# Patient Record
Sex: MALE | Race: WHITE | NOT HISPANIC OR LATINO | Employment: FULL TIME | URBAN - METROPOLITAN AREA
[De-identification: names, ages, dates, MRNs, and addresses within clinical notes are randomized per-mention and may not be internally consistent; named-entity substitution may affect disease eponyms.]

---

## 2017-01-03 ENCOUNTER — ALLSCRIPTS OFFICE VISIT (OUTPATIENT)
Dept: OTHER | Facility: OTHER | Age: 47
End: 2017-01-03

## 2017-04-15 ENCOUNTER — APPOINTMENT (EMERGENCY)
Dept: RADIOLOGY | Facility: HOSPITAL | Age: 47
End: 2017-04-15
Payer: COMMERCIAL

## 2017-04-15 ENCOUNTER — HOSPITAL ENCOUNTER (EMERGENCY)
Facility: HOSPITAL | Age: 47
Discharge: HOME | End: 2017-04-15
Admitting: EMERGENCY MEDICINE
Payer: COMMERCIAL

## 2017-04-15 VITALS
RESPIRATION RATE: 18 BRPM | HEIGHT: 70 IN | OXYGEN SATURATION: 97 % | SYSTOLIC BLOOD PRESSURE: 164 MMHG | DIASTOLIC BLOOD PRESSURE: 108 MMHG | TEMPERATURE: 97.5 F | BODY MASS INDEX: 25.77 KG/M2 | WEIGHT: 180 LBS | HEART RATE: 97 BPM

## 2017-04-15 DIAGNOSIS — S99.921A INJURY OF TOE ON RIGHT FOOT, INITIAL ENCOUNTER: Primary | ICD-10-CM

## 2017-04-15 PROCEDURE — 73630 X-RAY EXAM OF FOOT: CPT

## 2017-04-15 PROCEDURE — 99283 EMERGENCY DEPT VISIT LOW MDM: CPT

## 2017-04-15 RX ORDER — SIMVASTATIN 10 MG
1 TABLET ORAL DAILY
COMMUNITY
Start: 2017-01-03 | End: 2019-02-02 | Stop reason: SDUPTHER

## 2017-04-15 RX ORDER — ZOLPIDEM TARTRATE 10 MG/1
1 TABLET ORAL DAILY
COMMUNITY
Start: 2016-10-24 | End: 2018-08-03 | Stop reason: SDUPTHER

## 2017-04-15 RX ORDER — AMLODIPINE BESYLATE 5 MG/1
1 TABLET ORAL DAILY
COMMUNITY
Start: 2016-01-21 | End: 2019-01-21 | Stop reason: SDUPTHER

## 2017-04-15 RX ORDER — TRAMADOL HYDROCHLORIDE 50 MG/1
1 TABLET ORAL DAILY
COMMUNITY
Start: 2016-01-21 | End: 2018-06-15 | Stop reason: SDUPTHER

## 2017-04-15 RX ORDER — NEBIVOLOL 5 MG/1
5 TABLET ORAL DAILY
COMMUNITY
Start: 2016-01-21 | End: 2018-08-03 | Stop reason: HOSPADM

## 2017-04-15 RX ORDER — LISINOPRIL AND HYDROCHLOROTHIAZIDE 25; 20 MG/1; MG/1
1 TABLET ORAL DAILY
COMMUNITY
Start: 2016-02-26 | End: 2019-01-21 | Stop reason: SDUPTHER

## 2017-05-09 ENCOUNTER — APPOINTMENT (EMERGENCY)
Dept: CT IMAGING | Facility: HOSPITAL | Age: 47
End: 2017-05-09
Payer: COMMERCIAL

## 2017-05-09 ENCOUNTER — HOSPITAL ENCOUNTER (EMERGENCY)
Facility: HOSPITAL | Age: 47
Discharge: HOME | End: 2017-05-09
Attending: EMERGENCY MEDICINE | Admitting: EMERGENCY MEDICINE
Payer: COMMERCIAL

## 2017-05-09 VITALS
RESPIRATION RATE: 20 BRPM | OXYGEN SATURATION: 96 % | HEIGHT: 71 IN | TEMPERATURE: 98.5 F | WEIGHT: 180 LBS | SYSTOLIC BLOOD PRESSURE: 117 MMHG | BODY MASS INDEX: 25.2 KG/M2 | HEART RATE: 82 BPM | DIASTOLIC BLOOD PRESSURE: 73 MMHG

## 2017-05-09 DIAGNOSIS — N20.1 URETERIC STONE: Primary | ICD-10-CM

## 2017-05-09 DIAGNOSIS — N23 RENAL COLIC ON LEFT SIDE: ICD-10-CM

## 2017-05-09 LAB
ANION GAP SERPL CALCULATED.3IONS-SCNC: 9 MMOL/L (ref 4–13)
BACTERIA UR QL AUTO: ABNORMAL /HPF
BASOPHILS # BLD AUTO: 0.04 THOUSANDS/ΜL (ref 0–0.1)
BASOPHILS NFR BLD AUTO: 0 % (ref 0–1)
BILIRUB UR QL STRIP: NEGATIVE
BUN SERPL-MCNC: 13 MG/DL (ref 5–25)
CALCIUM SERPL-MCNC: 9.1 MG/DL (ref 8.3–10.1)
CHLORIDE SERPL-SCNC: 102 MMOL/L (ref 100–108)
CLARITY UR: CLEAR
CLARITY, POC: CLEAR
CO2 SERPL-SCNC: 30 MMOL/L (ref 21–32)
COLOR UR: YELLOW
COLOR, POC: YELLOW
CREAT SERPL-MCNC: 0.97 MG/DL (ref 0.6–1.3)
EOSINOPHIL # BLD AUTO: 0.15 THOUSAND/ΜL (ref 0–0.61)
EOSINOPHIL NFR BLD AUTO: 1 % (ref 0–6)
ERYTHROCYTE [DISTWIDTH] IN BLOOD BY AUTOMATED COUNT: 13.8 % (ref 11.6–15.1)
EXT BILIRUBIN, UA: ABNORMAL
EXT BLOOD URINE: ABNORMAL
EXT GLUCOSE, UA: ABNORMAL
EXT KETONES: ABNORMAL
EXT NITRITE, UA: ABNORMAL
EXT PH, UA: 6
EXT PROTEIN, UA: ABNORMAL
EXT SPECIFIC GRAVITY, UA: 1.03
EXT UROBILINOGEN: 0.2
GFR SERPL CREATININE-BSD FRML MDRD: >60 ML/MIN/1.73SQ M
GLUCOSE SERPL-MCNC: 114 MG/DL (ref 65–140)
GLUCOSE UR STRIP-MCNC: NEGATIVE MG/DL
HCT VFR BLD AUTO: 44.3 % (ref 36.5–49.3)
HGB BLD-MCNC: 15.5 G/DL (ref 12–17)
HGB UR QL STRIP.AUTO: ABNORMAL
KETONES UR STRIP-MCNC: NEGATIVE MG/DL
LEUKOCYTE ESTERASE UR QL STRIP: NEGATIVE
LYMPHOCYTES # BLD AUTO: 2.77 THOUSANDS/ΜL (ref 0.6–4.47)
LYMPHOCYTES NFR BLD AUTO: 24 % (ref 14–44)
MCH RBC QN AUTO: 31.6 PG (ref 26.8–34.3)
MCHC RBC AUTO-ENTMCNC: 35 G/DL (ref 31.4–37.4)
MCV RBC AUTO: 90 FL (ref 82–98)
MONOCYTES # BLD AUTO: 0.85 THOUSAND/ΜL (ref 0.17–1.22)
MONOCYTES NFR BLD AUTO: 7 % (ref 4–12)
NEUTROPHILS # BLD AUTO: 7.84 THOUSANDS/ΜL (ref 1.85–7.62)
NEUTS SEG NFR BLD AUTO: 68 % (ref 43–75)
NITRITE UR QL STRIP: NEGATIVE
NON-SQ EPI CELLS URNS QL MICRO: ABNORMAL /HPF
PH UR STRIP.AUTO: 5.5 [PH] (ref 4.5–8)
PLATELET # BLD AUTO: 180 THOUSANDS/UL (ref 149–390)
PMV BLD AUTO: 10.8 FL (ref 8.9–12.7)
POTASSIUM SERPL-SCNC: 3.5 MMOL/L (ref 3.5–5.3)
PROT UR STRIP-MCNC: NEGATIVE MG/DL
RBC # BLD AUTO: 4.91 MILLION/UL (ref 3.88–5.62)
RBC #/AREA URNS AUTO: ABNORMAL /HPF
SODIUM SERPL-SCNC: 141 MMOL/L (ref 136–145)
SP GR UR STRIP.AUTO: >=1.03 (ref 1–1.03)
UROBILINOGEN UR QL STRIP.AUTO: 0.2 E.U./DL
WBC # BLD AUTO: 11.65 THOUSAND/UL (ref 4.31–10.16)
WBC # BLD EST: ABNORMAL 10*3/UL
WBC #/AREA URNS AUTO: ABNORMAL /HPF

## 2017-05-09 PROCEDURE — 96375 TX/PRO/DX INJ NEW DRUG ADDON: CPT

## 2017-05-09 PROCEDURE — 85025 COMPLETE CBC W/AUTO DIFF WBC: CPT | Performed by: EMERGENCY MEDICINE

## 2017-05-09 PROCEDURE — 81002 URINALYSIS NONAUTO W/O SCOPE: CPT | Performed by: EMERGENCY MEDICINE

## 2017-05-09 PROCEDURE — 74176 CT ABD & PELVIS W/O CONTRAST: CPT

## 2017-05-09 PROCEDURE — 80048 BASIC METABOLIC PNL TOTAL CA: CPT | Performed by: EMERGENCY MEDICINE

## 2017-05-09 PROCEDURE — 81001 URINALYSIS AUTO W/SCOPE: CPT | Performed by: EMERGENCY MEDICINE

## 2017-05-09 PROCEDURE — 96361 HYDRATE IV INFUSION ADD-ON: CPT

## 2017-05-09 PROCEDURE — 96374 THER/PROPH/DIAG INJ IV PUSH: CPT

## 2017-05-09 PROCEDURE — 87086 URINE CULTURE/COLONY COUNT: CPT | Performed by: EMERGENCY MEDICINE

## 2017-05-09 PROCEDURE — 36415 COLL VENOUS BLD VENIPUNCTURE: CPT | Performed by: EMERGENCY MEDICINE

## 2017-05-09 PROCEDURE — 99284 EMERGENCY DEPT VISIT MOD MDM: CPT

## 2017-05-09 RX ORDER — TAMSULOSIN HYDROCHLORIDE 0.4 MG/1
0.4 CAPSULE ORAL ONCE
Status: COMPLETED | OUTPATIENT
Start: 2017-05-09 | End: 2017-05-09

## 2017-05-09 RX ORDER — ONDANSETRON 2 MG/ML
4 INJECTION INTRAMUSCULAR; INTRAVENOUS ONCE
Status: COMPLETED | OUTPATIENT
Start: 2017-05-09 | End: 2017-05-09

## 2017-05-09 RX ORDER — MORPHINE SULFATE 4 MG/ML
4 INJECTION, SOLUTION INTRAMUSCULAR; INTRAVENOUS ONCE
Status: COMPLETED | OUTPATIENT
Start: 2017-05-09 | End: 2017-05-09

## 2017-05-09 RX ORDER — IBUPROFEN 600 MG/1
600 TABLET ORAL EVERY 6 HOURS PRN
Qty: 30 TABLET | Refills: 0 | Status: SHIPPED | OUTPATIENT
Start: 2017-05-09 | End: 2018-08-03 | Stop reason: HOSPADM

## 2017-05-09 RX ORDER — TAMSULOSIN HYDROCHLORIDE 0.4 MG/1
CAPSULE ORAL
Qty: 14 CAPSULE | Refills: 0 | Status: SHIPPED | OUTPATIENT
Start: 2017-05-09 | End: 2018-02-09 | Stop reason: SDUPTHER

## 2017-05-09 RX ORDER — ONDANSETRON 4 MG/1
4 TABLET, FILM COATED ORAL EVERY 6 HOURS
Qty: 120 TABLET | Refills: 0 | Status: SHIPPED | OUTPATIENT
Start: 2017-05-09 | End: 2018-08-03 | Stop reason: HOSPADM

## 2017-05-09 RX ORDER — KETOROLAC TROMETHAMINE 30 MG/ML
30 INJECTION, SOLUTION INTRAMUSCULAR; INTRAVENOUS ONCE
Status: COMPLETED | OUTPATIENT
Start: 2017-05-09 | End: 2017-05-09

## 2017-05-09 RX ORDER — OXYCODONE HYDROCHLORIDE AND ACETAMINOPHEN 5; 325 MG/1; MG/1
1 TABLET ORAL EVERY 4 HOURS PRN
COMMUNITY
End: 2018-04-16

## 2017-05-09 RX ADMIN — SODIUM CHLORIDE 1000 ML: 0.9 INJECTION, SOLUTION INTRAVENOUS at 02:50

## 2017-05-09 RX ADMIN — ONDANSETRON 4 MG: 2 INJECTION INTRAMUSCULAR; INTRAVENOUS at 02:50

## 2017-05-09 RX ADMIN — TAMSULOSIN HYDROCHLORIDE 0.4 MG: 0.4 CAPSULE ORAL at 04:22

## 2017-05-09 RX ADMIN — KETOROLAC TROMETHAMINE 30 MG: 30 INJECTION, SOLUTION INTRAMUSCULAR at 02:53

## 2017-05-09 RX ADMIN — MORPHINE SULFATE 4 MG: 4 INJECTION, SOLUTION INTRAMUSCULAR; INTRAVENOUS at 04:08

## 2017-05-10 LAB — BACTERIA UR CULT: NORMAL

## 2017-05-15 ENCOUNTER — ALLSCRIPTS OFFICE VISIT (OUTPATIENT)
Dept: OTHER | Facility: OTHER | Age: 47
End: 2017-05-15

## 2017-05-15 ENCOUNTER — GENERIC CONVERSION - ENCOUNTER (OUTPATIENT)
Dept: OTHER | Facility: OTHER | Age: 47
End: 2017-05-15

## 2017-05-15 LAB
CLARITY UR: NORMAL
COLOR UR: YELLOW
GLUCOSE (HISTORICAL): NORMAL
HGB UR QL STRIP.AUTO: NORMAL
KETONES UR STRIP-MCNC: NORMAL MG/DL
LEUKOCYTE ESTERASE UR QL STRIP: NORMAL
NITRITE UR QL STRIP: NORMAL
PH UR STRIP.AUTO: 5 [PH]
PROT UR STRIP-MCNC: NORMAL MG/DL
SP GR UR STRIP.AUTO: 1.03

## 2017-05-23 LAB
CA OXAL.DIHYDRATE (HISTORICAL): 3 %
CA OXAL.DIHYDRATE (HISTORICAL): 90 %
CA PHOSPHORUS (HISTORICAL): 5 %
COLOR UR: NORMAL
COMMENT (HISTORICAL): NORMAL
COMMENT (HISTORICAL): NORMAL
COMPOSITION (HISTORICAL): NORMAL
DISCLAIMER: (HISTORICAL): NORMAL
NIDUS (HISTORICAL): NORMAL
PLEASE NOTE: (HISTORICAL): NORMAL
SIZE (HISTORICAL): NORMAL MM
WEIGHT (HISTORICAL): 4 MG

## 2017-06-30 ENCOUNTER — ALLSCRIPTS OFFICE VISIT (OUTPATIENT)
Dept: OTHER | Facility: OTHER | Age: 47
End: 2017-06-30

## 2017-10-09 ENCOUNTER — ALLSCRIPTS OFFICE VISIT (OUTPATIENT)
Dept: OTHER | Facility: OTHER | Age: 47
End: 2017-10-09

## 2017-10-10 NOTE — PROGRESS NOTES
Assessment  1  Encounter for preventive health examination (V70 0) (Z00 00)   2  Lateral epicondylitis (726 32) (M77 10)   3  Anxiety (300 00) (F41 9)    Plan  Anxiety    · BuPROPion HCl ER (SR) 150 MG Oral Tablet Extended Release 12 Hour; take 1  tablet by mouth twice a day    Chief Complaint  right elbow pain--gallo shot      History of Present Illness  HPI: 41year old male complains of chronic right elbow pain on lateral aspect  Notes that cortisone shot last year relieved pain for one year  Patient is a  and notes that pain has returned  He denies new injury  Also complaining of daily, generalized anxiety  States anxiety keeps patient awake at night  Several life stressors contributing to anxiety  Has never been on SSRI or other antianxiety medication  Review of Systems    Constitutional: no fever or chills, feels well, no tiredness, no recent weight loss or weight gain  Cardiovascular: no complaints of slow or fast heart rate, no chest pain, no palpitations, no leg claudication or lower extremity edema  Respiratory: no complaints of shortness of breath, no wheezing or cough, no dyspnea on exertion, no orthopnea or PND  Musculoskeletal: as noted in HPI  Integumentary: no complaints of skin rash or lesion, no itching or dry skin, no skin wounds  Other Symptoms: anxiety  Active Problems  1  BPH (benign prostatic hyperplasia) (600 00) (N40 0)   2  Hyperlipidemia, mixed (272 2) (E78 2)   3  Hypertension (401 9) (I10)   4  Insomnia (780 52) (G47 00)   5  Lateral epicondylitis (726 32) (M77 10)   6  Lumbago (724 2) (M54 5)   7  Nephrolithiasis (592 0) (N20 0)   8  Strain of foot, right (845 10) (P06 685O)    Family History  Mother    1  Family history of hypertension (V17 49) (Z82 49)   2  Family history of multiple sclerosis (V17 2) (Z82 0)  Father    3  Family history of hypertension (V17 49) (Z82 49)  Family History    4  Family history of Back disorder   5   Family history of diabetes mellitus (V18 0) (Z83 3)   6  Family history of stroke (V17 1) (Z82 3)   7  Denied: Family history of substance abuse   8  No family history of mental disorder    Social History   · Current every day smoker (305 1) (F17 200)    Current Meds   1  AmLODIPine Besylate 5 MG Oral Tablet; take 1 tablet by mouth once daily; Therapy: 21Jan2016 to (Maren Ellis)  Requested for: 87IPW5813; Last   Rx:03Jan2017 Ordered   2  Bystolic 5 MG Oral Tablet; TAKE 1 TABLET DAILY; Therapy: 21Jan2016 to (Evaluate:72Oqh2199)  Requested for: 52Ydx1922; Last   Rx:19Apr2016 Ordered   3  Lisinopril-Hydrochlorothiazide 20-25 MG Oral Tablet; take 1 tablet by mouth once daily; Therapy: 57MYE6028 to (Maren Ellis)  Requested for: 99OAV9966; Last   Rx:03Jan2017 Ordered   4  Oxycodone-Acetaminophen  MG Oral Tablet; 1 q 4hrs prn; Therapy: 35Iuj6491 to (Last Rx:00Bye2716); Status: ACTIVE - Retrospective By   Protocol Authorization Ordered   5  Simvastatin 10 MG Oral Tablet; TAKE 1 TABLET DAILY; Therapy: 02OTJ6513 to (Evaluate:23Rnp4060)  Requested for: 21DDD5072; Last   KX:53AQK2408 Ordered   6  Tamsulosin HCl - 0 4 MG Oral Capsule; take 1 capsule daily; Therapy: 96XBB5124 to (Evaluate:66Ffm9377)  Requested for: 80GLP6121; Last   Rx:30Jun2017 Ordered   7  TraMADol HCl - 50 MG Oral Tablet; take 1 tablet by mouth every 4 hours if needed; Therapy: 21Jan2016 to ([phone])  Requested for: 55Dhp9432; Last   Rx:30Jun2017; Status: ACTIVE - Renewal Denied Ordered   8  Zolpidem Tartrate 10 MG Oral Tablet; Take 1 tablet by mouth at bedtime; Therapy: 51DOB4654 to ([id number])  Requested for: 00HHQ1394; Last   II:02GHX1224; Status: ACTIVE - Retrospective By Protocol Authorization Ordered    Allergies  1   No Known Drug Allergies    Vitals   Recorded: 50BMC3703 02:45PM   Heart Rate 96   Systolic 620   Diastolic 80   Height 5 ft 10 in   Weight 180 lb    BMI Calculated 25 83   BSA Calculated 2   O2 Saturation 98     Physical Exam    Constitutional   General appearance: No acute distress, well appearing and well nourished  Pulmonary   Respiratory effort: No increased work of breathing or signs of respiratory distress  Auscultation of lungs: Clear to auscultation, equal breath sounds bilaterally, no wheezes, no rales, no rhonci  Cardiovascular   Auscultation of heart: Normal rate and rhythm, normal S1 and S2, without murmurs  Musculoskeletal   Gait and station: Normal     Digits and nails: Normal without clubbing or cyanosis  Inspection/palpation of joints, bones, and muscles: Abnormal  -Tenderness to palpation lateral aspect of right elbow  Skin   Skin and subcutaneous tissue: Normal without rashes or lesions  Results/Data  PHQ-2 Adult Depression Screening 80SVU8546 02:49PM User, [facility]     Test Name Result Flag Reference   PHQ-2 Adult Depression Score 0     Over the last two weeks, how often have you been bothered by any of the following problems? Little interest or pleasure in doing things: Not at all - 0  Feeling down, depressed, or hopeless: Not at all - 0   PHQ-2 Adult Depression Screening Negative         Procedure    Procedure: Injection of the tendon sheath on the right   Indication: joint pain  Risk and bleeding risk were discussed with the patient  Verbal consent was obtained prior to the procedure  Preparation: alcohol was used to prep the area  Procedure Note: Using sterile technique, the aspiration/injection needle was then directed from a direct aspect  A 22-gauge and 1 5 inch was used to inject 2 mL of 1% Lidocaine-and-1 mL of 10 mg/mL dexamethasone  Post-Procedure: the patient tolerated the procedure well  Complications: None  Follow-up in the office as needed        Signatures   Electronically signed by : Cristopher Silva MD; Oct  9 2017  4:22PM EST                       (Author)

## 2017-12-20 ENCOUNTER — GENERIC CONVERSION - ENCOUNTER (OUTPATIENT)
Dept: OTHER | Facility: OTHER | Age: 47
End: 2017-12-20

## 2017-12-28 LAB
A/G RATIO (HISTORICAL): 1.8 (ref 1.2–2.2)
ALBUMIN SERPL BCP-MCNC: 4.4 G/DL (ref 3.5–5.5)
ALP SERPL-CCNC: 90 IU/L (ref 39–117)
ALT SERPL W P-5'-P-CCNC: 32 IU/L (ref 0–44)
AST SERPL W P-5'-P-CCNC: 27 IU/L (ref 0–40)
BILIRUB SERPL-MCNC: <0.2 MG/DL (ref 0–1.2)
BUN SERPL-MCNC: 9 MG/DL (ref 6–24)
BUN/CREA RATIO (HISTORICAL): 9 (ref 9–20)
CALCIUM SERPL-MCNC: 9.8 MG/DL (ref 8.7–10.2)
CHLORIDE SERPL-SCNC: 102 MMOL/L (ref 96–106)
CHOLEST SERPL-MCNC: 207 MG/DL (ref 100–199)
CO2 SERPL-SCNC: 28 MMOL/L (ref 18–29)
CREAT SERPL-MCNC: 0.97 MG/DL (ref 0.76–1.27)
EGFR AFRICAN AMERICAN (HISTORICAL): 109 ML/MIN/1.73
EGFR-AMERICAN CALC (HISTORICAL): 94 ML/MIN/1.73
GLUCOSE SERPL-MCNC: 96 MG/DL (ref 65–99)
HDLC SERPL-MCNC: 47 MG/DL
LDLC SERPL CALC-MCNC: 126 MG/DL (ref 0–99)
POTASSIUM SERPL-SCNC: 4.4 MMOL/L (ref 3.5–5.2)
SODIUM SERPL-SCNC: 144 MMOL/L (ref 134–144)
TOT. GLOBULIN, SERUM (HISTORICAL): 2.5 G/DL (ref 1.5–4.5)
TOTAL PROTEIN (HISTORICAL): 6.9 G/DL (ref 6–8.5)
TRIGL SERPL-MCNC: 172 MG/DL (ref 0–149)
VLDLC SERPL CALC-MCNC: 34 MG/DL (ref 5–40)

## 2017-12-30 ENCOUNTER — GENERIC CONVERSION - ENCOUNTER (OUTPATIENT)
Dept: OTHER | Facility: OTHER | Age: 47
End: 2017-12-30

## 2018-01-12 NOTE — PROGRESS NOTES
Assessment    1  Encounter for preventive health examination (V70 0) (Z00 00)   2  Lateral epicondylitis (726 32) (M77 10)    Discussion/Summary  Advice and education were given regarding tobacco cessation and sunscreen use  Chief Complaint  Routine PE      Advance Directives  Advance Directive [facility]:   NO - Patient does not have an advance health care directive  History of Present Illness  HM, Adult Male: The patient is being seen for a health maintenance evaluation  Social History: Household members include spouse  He is   Work status: working full time  The patient is a current cigarette smoker  He has smoked for 20 year(s)  He is not ready to quit using tobacco  He reports rare alcohol use and denies binge drinking  The patient has no concerns about alcohol abuse  He has never used illicit drugs  General Health: The patient's health since the last visit is described as good  He has regular dental visits  He complains of vision problems  He denies hearing loss  Immunizations status: up to date  Lifestyle:  He does not have a healthy diet  He does not have any weight concerns  He exercises regularly  He uses tobacco  He consumes alcohol  He denies drug use  Reproductive health:  the patient is sexually active  Screening: Active Problems    1  BPH (benign prostatic hyperplasia) (600 00) (N40 0)   2  Hyperlipidemia, mixed (272 2) (E78 2)   3  Hypertension (401 9) (I10)   4  Insomnia (780 52) (G47 00)   5  Lateral epicondylitis (726 32) (M77 10)   6  Lumbago (724 2) (M54 5)   7  Nephrolithiasis (592 0) (N20 0)   8   Strain of foot, right (845 10) (J30 559J)    Family History  Mother    · Family history of hypertension (V17 49) (Z82 49)   · Family history of multiple sclerosis (V17 2) (Z82 0)  Father    · Family history of hypertension (V17 49) (Z82 49)  Family History    · Family history of Back disorder   · Family history of diabetes mellitus (V18 0) (Z83 3)   · Family history of stroke (V17 1) (Z82 3)   · Denied: Family history of substance abuse   · No family history of mental disorder    Social History    · Current every day smoker (305 1) (F17 200)    Current Meds   1  AmLODIPine Besylate 5 MG Oral Tablet; take 1 tablet by mouth once daily; Therapy: 21Jan2016 to (Johanna Arriola)  Requested for: 62HYO3052; Last   Rx:03Jan2017 Ordered   2  Bystolic 5 MG Oral Tablet; TAKE 1 TABLET DAILY; Therapy: 21Jan2016 to (Evaluate:69Ocu6028)  Requested for: 32Lxf5835; Last   Rx:19Apr2016 Ordered   3  Lisinopril-Hydrochlorothiazide 20-25 MG Oral Tablet; take 1 tablet by mouth once daily; Therapy: 74YYO0943 to (Johanna Arriola)  Requested for: 01JME3784; Last   Rx:03Jan2017 Ordered   4  Oxycodone-Acetaminophen  MG Oral Tablet; 1 q 4hrs prn; Therapy: 87Ccj3820 to (Last Rx:93Ewg2669); Status: ACTIVE - Retrospective By   Protocol Authorization Ordered   5  Simvastatin 10 MG Oral Tablet; TAKE 1 TABLET DAILY; Therapy: 87DNO1826 to (Evaluate:68Dvy7753)  Requested for: 43PZQ5638; Last   SH:35DCH2764 Ordered   6  Tamsulosin HCl - 0 4 MG Oral Capsule; take 1 capsule daily; Therapy: 91BZM0296 to (Evaluate:88Qyp1068)  Requested for: 22QKW2882; Last   Rx:30Jun2017 Ordered   7  TraMADol HCl - 50 MG Oral Tablet; take 1 tablet by mouth every 4 hours if needed; Therapy: 21Jan2016 to (Rosy Gustafson)  Requested for: 22Xlu6734; Last   Rx:30Jun2017; Status: ACTIVE - Renewal Denied Ordered   8  Zolpidem Tartrate 10 MG Oral Tablet; Take 1 tablet by mouth at bedtime; Therapy: 57FWX9867 to (03 17 74 30 53)  Requested for: 95KYL9178; Last   DR:21KXQ3865; Status: ACTIVE - Retrospective By Protocol Authorization Ordered    Allergies    1   No Known Drug Allergies    Vitals   Recorded: 69TFM7751 02:45PM   Heart Rate 96   Systolic 744   Diastolic 80   Height 5 ft 10 in   Weight 180 lb    BMI Calculated 25 83   BSA Calculated 2   O2 Saturation 98     Physical Exam    Constitutional   General appearance: No acute distress, well appearing and well nourished  Neck   Neck: Supple, symmetric, trachea midline, no masses  Thyroid: Normal, no thyromegaly  Pulmonary   Auscultation of lungs: Clear to auscultation  Cardiovascular   Palpation of heart: Normal PMI, no thrills  Examination of extremities for edema and/or varicosities: Normal     Abdomen   Abdomen: Non-tender, no masses  Results/Data  PHQ-2 Adult Depression Screening 48FGU5052 02:49PM User, [facility]     Test Name Result Flag Reference   PHQ-2 Adult Depression Score 0     Over the last two weeks, how often have you been bothered by any of the following problems?   Little interest or pleasure in doing things: Not at all - 0  Feeling down, depressed, or hopeless: Not at all - 0   PHQ-2 Adult Depression Screening Negative         Signatures   Electronically signed by : Yessenia Klein MD; Oct  9 2017  2:58PM EST                       (Author)

## 2018-01-12 NOTE — RESULT NOTES
Verified Results  [facility]) CMP14+eGFR 89YLD3477 08:58AM Kendall Mcmanus     Test Name Result Flag Reference   Glucose, Serum 82 mg/dL  65-99   BUN 9 mg/dL  6-24   Creatinine, Serum 0 92 mg/dL  0 76-1 27   eGFR If NonAfricn Am 101 mL/min/1 73  >59   eGFR If Africn Am 117 mL/min/1 73  >59   BUN/Creatinine Ratio 10  9-20   Sodium, Serum 142 mmol/L  134-144   Potassium, Serum 4 3 mmol/L  3 5-5 2   Chloride, Serum 100 mmol/L     Carbon Dioxide, Total 27 mmol/L  18-29   Calcium, Serum 9 8 mg/dL  8 7-10 2   Protein, Total, Serum 6 8 g/dL  6 0-8 5   Albumin, Serum 4 5 g/dL  3 5-5 5   Globulin, Total 2 3 g/dL  1 5-4 5   A/G Ratio 2 0  1 1-2 5   Bilirubin, Total 0 3 mg/dL  0 0-1 2   Alkaline Phosphatase, S 70 IU/L     AST (SGOT) 20 IU/L  0-40   ALT (SGPT) 18 IU/L  0-44   Glucose, Serum 82 mg/dL  65-99   BUN 9 mg/dL  6-24   Creatinine, Serum 0 92 mg/dL  0 76-1 27   eGFR If NonAfricn Am 101 mL/min/1 73  >59   eGFR If Africn Am 117 mL/min/1 73  >59   BUN/Creatinine Ratio 10  9-20   Sodium, Serum 142 mmol/L  134-144   Potassium, Serum 4 3 mmol/L  3 5-5 2   Chloride, Serum 100 mmol/L     Carbon Dioxide, Total 27 mmol/L  18-29   Calcium, Serum 9 8 mg/dL  8 7-10 2   Protein, Total, Serum 6 8 g/dL  6 0-8 5   Albumin, Serum 4 5 g/dL  3 5-5 5   Globulin, Total 2 3 g/dL  1 5-4 5   A/G Ratio 2 0  1 1-2 5   Bilirubin, Total 0 3 mg/dL  0 0-1 2   Alkaline Phosphatase, S 70 IU/L     AST (SGOT) 20 IU/L  0-40   ALT (SGPT) 18 IU/L  0-44           (LC) Lipid Panel 58Jgd0788 08:58AM Feedback     Test Name Result Flag Reference   Cholesterol, Total 250 mg/dL H 100-199   Triglycerides 170 mg/dL H 0-149   HDL Cholesterol 56 mg/dL  >39   VLDL Cholesterol Arnaldo 34 mg/dL  5-40   LDL Cholesterol Calc 160 mg/dL H 0-99   Cholesterol, Total 250 mg/dL H 100-199   Triglycerides 170 mg/dL H 0-149   HDL Cholesterol 56 mg/dL  >39   VLDL Cholesterol Arnaldo 34 mg/dL  5-40   LDL Cholesterol Calc 160 mg/dL H 0-99

## 2018-01-13 VITALS
HEIGHT: 70 IN | BODY MASS INDEX: 25.91 KG/M2 | WEIGHT: 181 LBS | DIASTOLIC BLOOD PRESSURE: 75 MMHG | SYSTOLIC BLOOD PRESSURE: 126 MMHG | HEART RATE: 96 BPM | OXYGEN SATURATION: 98 %

## 2018-01-14 VITALS
HEIGHT: 70 IN | OXYGEN SATURATION: 98 % | HEART RATE: 76 BPM | BODY MASS INDEX: 26.34 KG/M2 | WEIGHT: 184 LBS | RESPIRATION RATE: 16 BRPM | DIASTOLIC BLOOD PRESSURE: 100 MMHG | SYSTOLIC BLOOD PRESSURE: 140 MMHG

## 2018-01-14 VITALS
SYSTOLIC BLOOD PRESSURE: 128 MMHG | HEART RATE: 96 BPM | HEIGHT: 70 IN | BODY MASS INDEX: 25.77 KG/M2 | WEIGHT: 180 LBS | DIASTOLIC BLOOD PRESSURE: 80 MMHG | OXYGEN SATURATION: 98 %

## 2018-01-14 VITALS
WEIGHT: 186 LBS | SYSTOLIC BLOOD PRESSURE: 133 MMHG | HEART RATE: 88 BPM | DIASTOLIC BLOOD PRESSURE: 87 MMHG | BODY MASS INDEX: 26.63 KG/M2 | HEIGHT: 70 IN

## 2018-01-17 NOTE — PROGRESS NOTES
Assessment   1  Hypertension (401 9) (I10)  2  Lumbago (724 2) (M54 5)  3  Hyperlipidemia, mixed (272 2) (E78 2)    Plan  Lumbago    · Changed: From  TraMADol HCl - 50 MG Oral Tablet Take 1 tablet twice daily To TraMADol  HCl - 50 MG Oral Tablet 1 q 4hrs prn   · Renew: Oxycodone-Acetaminophen  MG Oral Tablet; TAKE 1 TABLET Every 4  hours PRN pain    f/u prn     Chief Complaint  med manage      History of Present Illness  see cc      Review of Systems    Constitutional: No fever or chills, feels well, no tiredness, no recent weight gain or weight loss  Cardiovascular: No complaints of slow heart rate, no fast heart rate, no chest pain, no palpitations, no leg claudication, no lower extremity  Respiratory: No complaints of shortness of breath, no wheezing, no cough, no SOB on exertion, no orthopnea or PND  Active Problems   1  Hyperlipidemia, mixed (272 2) (E78 2)  2  Hypertension (401 9) (I10)  3  Insomnia (780 52) (G47 00)  4  Lumbago (724 2) (M54 5)    Family History   1  Family history of hypertension (V17 49) (Z82 49)  2  Family history of multiple sclerosis (V17 2) (Z82 0)   3  Family history of hypertension (V17 49) (Z82 49)    The family history was reviewed and updated today  Social History    · Current every day smoker (305 1) (F17 200)    Current Meds  1  AmLODIPine Besylate 5 MG Oral Tablet; Take 1 tablet daily; Therapy: 21Jan2016 to (Evaluate:20Apr2016) Recorded  2  Bystolic 5 MG Oral Tablet; TAKE 1 TABLET DAILY; Therapy: 21Jan2016 to Recorded  3  Lisinopril-Hydrochlorothiazide 10-12 5 MG Oral Tablet; TAKE 1 TABLET TWICE DAILY; Therapy: 21Jan2016 to Recorded  4  Oxycodone-Acetaminophen  MG Oral Tablet; TAKE 1 TABLET EVERY 4 TO 6   HOURS AS NEEDED FOR PAIN;   Therapy: 56UHM3621 to (Evaluate:08Jan2016); Last Rx:09Vgi6613 Ordered  5  TraMADol HCl - 50 MG Oral Tablet; Take 1 tablet twice daily; Therapy: 21Jan2016 to (Evaluate:15Sxe1531) Recorded  6   Zolpidem Tartrate 10 MG Oral Tablet; Take 1 tablet by mouth at bedtime; Therapy: 69HQA5060 to (Evaluate:35Ttx9659)  Requested for: 36IUS2468; Last   Rx:05Jan2016; Status: ACTIVE - Renewal Denied Ordered    Allergies   1  No Known Drug Allergies    Vitals  Vital Signs [Data Includes: Current Encounter]    Recorded: 87FOJ0067 56:69SS   Systolic 172   Diastolic 82   Height 5 ft 10 in   Weight 176 lb    BMI Calculated 25 25   BSA Calculated 1 98     Physical Exam    Constitutional   General appearance: No acute distress, well appearing and well nourished  Pulmonary   Respiratory effort: No increased work of breathing or signs of respiratory distress  Cardiovascular   Auscultation of heart: Normal rate and rhythm, normal S1 and S2, without murmurs      Musculoskeletal   Inspection/palpation of joints, bones, and muscles: Normal          Signatures   Electronically signed by : Franky Lomax MD; Jan 21 2016  4:25PM EST                       (Author)    Electronically signed by : Franky Lomax MD; Jan 21 2016  4:26PM EST                       (Author)

## 2018-01-17 NOTE — RESULT NOTES
Verified Results  * XR FOOT 3+ VIEW RIGHT 52Eus7721 04:06PM [facility] Order Number: [id number]     Test Name Result Flag Reference   XR FOOT 3+ VW RIGHT (Report)     RIGHT FOOT     INDICATION: Right heel pain  COMPARISON: None     VIEWS: AP, lateral and oblique ; 3 images     FINDINGS:     There is no acute fracture or dislocation  Small plantar and retrocalcaneal spurs noted  No lytic or blastic lesions are seen  Soft tissues are unremarkable  IMPRESSION:     No acute osseous abnormality  Workstation performed: [id number]     Signed by:   Joanna Braga MD   8/30/16

## 2018-01-19 ENCOUNTER — ALLSCRIPTS OFFICE VISIT (OUTPATIENT)
Dept: OTHER | Facility: OTHER | Age: 48
End: 2018-01-19

## 2018-01-20 NOTE — PROGRESS NOTES
Assessment   1  Hyperlipidemia, mixed (272 2) (E78 2)   2  Hypertension (401 9) (I10)   3  Anxiety (300 00) (F41 9)    Plan   Anxiety    · BuPROPion HCl ER (SR) 150 MG Oral Tablet Extended Release 12 Hour   · PARoxetine HCl - 20 MG Oral Tablet (Paxil); TAKE ONE (1) TABLET(S) DAILY  Hyperlipidemia, mixed    · Simvastatin 10 MG Oral Tablet; take 1 tablet by mouth once daily  Hypertension    · AmLODIPine Besylate 5 MG Oral Tablet; take 1 tablet by mouth once daily   · Lisinopril-Hydrochlorothiazide 20-25 MG Oral Tablet; take 1 tablet by mouth once    daily  Insomnia    · Zolpidem Tartrate 10 MG Oral Tablet; Take 1 tablet by mouth at bedtime  Lumbago    · TraMADol HCl - 50 MG Oral Tablet; take 1 tablet by mouth every 4 hours if    needed  Strain of foot, right    · Oxycodone-Acetaminophen  MG Oral Tablet; 1 q 4hrs prn; Do Not Fill    Before: 80JPP8471    Chief Complaint   MM-REVIEW LABS & MEDICATIONS BuPROP  History of Present Illness   stable lipids/pain---incr anxiety      Review of Systems        Constitutional: No fever or chills, feels well, no tiredness, no recent weight gain or weight loss  Cardiovascular: No complaints of slow heart rate, no fast heart rate, no chest pain, no palpitations, no leg claudication, no lower extremity  Respiratory: No complaints of shortness of breath, no wheezing, no cough, no SOB on exertion, no orthopnea or PND  Active Problems   1  Anxiety (300 00) (F41 9)   2  BPH (benign prostatic hyperplasia) (600 00) (N40 0)   3  Hyperlipidemia, mixed (272 2) (E78 2)   4  Hypertension (401 9) (I10)   5  Insomnia (780 52) (G47 00)   6  Lateral epicondylitis (726 32) (M77 10)   7  Lumbago (724 2) (M54 5)   8  Nephrolithiasis (592 0) (N20 0)   9  Strain of foot, right (845 10) (M78 933L)    Past Medical History      The active problems and past medical history were reviewed and updated today  Family History   Mother    1   Family history of hypertension (V17 49) (Z82 49)   2  Family history of multiple sclerosis (V17 2) (Z82 0)  Father    3  Family history of hypertension (V17 49) (Z82 49)  Family History    4  Family history of Back disorder   5  Family history of diabetes mellitus (V18 0) (Z83 3)   6  Family history of stroke (V17 1) (Z82 3)   7  Denied: Family history of substance abuse   8  No family history of mental disorder    Social History    · Current every day smoker (305 1) (F17 200)    Current Meds    1  AmLODIPine Besylate 5 MG Oral Tablet; take 1 tablet by mouth once daily; Therapy: 21Jan2016 to (Evaluate:11Jun2018)  Requested for: 66Bnf3825; Last     Rx:47Rli4264; Status: ACTIVE - Retrospective By Protocol Authorization Ordered   2  BuPROPion HCl ER (SR) 150 MG Oral Tablet Extended Release 12 Hour; take 1 tablet by     mouth twice a day; Therapy: 74QVX1343 to 9397 8822)  Requested for: 09Oct2017; Last     Rx:09Oct2017 Ordered   3  Bystolic 5 MG Oral Tablet; TAKE 1 TABLET DAILY; Therapy: 21Jan2016 to (Evaluate:14Apr2017)  Requested for: 85Zga6177; Last     Rx:52Vpe6517 Ordered   4  Lisinopril-Hydrochlorothiazide 20-25 MG Oral Tablet; take 1 tablet by mouth once daily; Therapy: 98ACI9485 to (Roxana Carmichael)  Requested for: 23OJI1465; Last     Rx:08Jan2018; Status: ACTIVE - Retrospective By Protocol Authorization Ordered   5  Oxycodone-Acetaminophen  MG Oral Tablet; 1 q 4hrs prn; Therapy: 20Ypl1361 to (Last Rx:06Cqn5822); Status: ACTIVE - Retrospective By Protocol     Authorization Ordered   6  Simvastatin 10 MG Oral Tablet; take 1 tablet by mouth once daily; Therapy: 34FHD1422 to (Roxana Carmichael)  Requested for: 05VHP0023; Last     Rx:08Jan2018; Status: ACTIVE - Retrospective By Protocol Authorization Ordered   7  Tamsulosin HCl - 0 4 MG Oral Capsule; take 1 capsule daily; Therapy: 78PUM2229 to (Evaluate:81Izb1118)  Requested for: 36SDP8100; Last     Rx:30Jun2017 Ordered   8   TraMADol HCl - 50 MG Oral Tablet; take 1 tablet by mouth every 4 hours if needed; Therapy: 21Jan2016 to (Evaluate:98Gex5773)  Requested for: 75PIG6371; Last     Rx:87Xfn0191; Status: ACTIVE - Retrospective By Protocol Authorization Ordered   9  Zolpidem Tartrate 10 MG Oral Tablet; Take 1 tablet by mouth at bedtime; Therapy: 97RFT4000 to (Evaluate:21Apr2018)  Requested for: 23Oct2017; Last     Rx:23Oct2017; Status: ACTIVE - Retrospective By Protocol Authorization Ordered    Allergies   1  No Known Drug Allergies    Vitals   Vital Signs    Recorded: 23DXS6424 03:03PM   Heart Rate 954   Systolic 095   Diastolic 76   Height 5 ft 10 in   Weight 184 lb    BMI Calculated 26 4   BSA Calculated 2 01   O2 Saturation 95     Physical Exam        Constitutional      General appearance: No acute distress, well appearing and well nourished  Pulmonary      Auscultation of lungs: Clear to auscultation, equal breath sounds bilaterally, no wheezes, no rales, no rhonci  Cardiovascular      Auscultation of heart: Normal rate and rhythm, normal S1 and S2, without murmurs            Signatures    Electronically signed by : Vicente Stroud MD; Jan 19 2018  3:30PM EST                       (Author)

## 2018-01-23 VITALS
HEIGHT: 70 IN | SYSTOLIC BLOOD PRESSURE: 124 MMHG | WEIGHT: 184 LBS | BODY MASS INDEX: 26.34 KG/M2 | OXYGEN SATURATION: 95 % | HEART RATE: 108 BPM | DIASTOLIC BLOOD PRESSURE: 76 MMHG

## 2018-01-23 NOTE — RESULT NOTES
Verified Results  (1) COMPREHENSIVE METABOLIC PANEL 21QVR7665 35:59HH Shearon Guard     Test Name Result Flag Reference   Glucose, Serum 96 mg/dL  65-99   BUN 9 mg/dL  6-24   Creatinine, Serum 0 97 mg/dL  0 76-1 27   BUN/Creatinine Ratio 9  9-20   Sodium, Serum 144 mmol/L  134-144   Potassium, Serum 4 4 mmol/L  3 5-5 2   Chloride, Serum 102 mmol/L     Carbon Dioxide, Total 28 mmol/L  18-29   Calcium, Serum 9 8 mg/dL  8 7-10 2   Protein, Total, Serum 6 9 g/dL  6 0-8 5   Albumin, Serum 4 4 g/dL  3 5-5 5   Globulin, Total 2 5 g/dL  1 5-4 5   A/G Ratio 1 8  1 2-2 2   Bilirubin, Total <0 2 mg/dL  0 0-1 2   Alkaline Phosphatase, S 90 IU/L     AST (SGOT) 27 IU/L  0-40   ALT (SGPT) 32 IU/L  0-44   eGFR If NonAfricn Am 94 mL/min/1 73  >59   eGFR If Africn Am 109 mL/min/1 73  >59   Glucose, Serum 96 mg/dL  65-99   BUN 9 mg/dL  6-24   Creatinine, Serum 0 97 mg/dL  0 76-1 27   BUN/Creatinine Ratio 9  9-20   Sodium, Serum 144 mmol/L  134-144   Potassium, Serum 4 4 mmol/L  3 5-5 2   Chloride, Serum 102 mmol/L     Carbon Dioxide, Total 28 mmol/L  18-29   Calcium, Serum 9 8 mg/dL  8 7-10 2   Protein, Total, Serum 6 9 g/dL  6 0-8 5   Albumin, Serum 4 4 g/dL  3 5-5 5   Globulin, Total 2 5 g/dL  1 5-4 5   A/G Ratio 1 8  1 2-2 2   Bilirubin, Total <0 2 mg/dL  0 0-1 2   Alkaline Phosphatase, S 90 IU/L     AST (SGOT) 27 IU/L  0-40   ALT (SGPT) 32 IU/L  0-44   eGFR If NonAfricn Am 94 mL/min/1 73  >59   eGFR If Africn Am 109 mL/min/1 73  >59           (LC) Lipid Panel 28Lva2631 09:21AM Shearon Guard     Test Name Result Flag Reference   Cholesterol, Total 207 mg/dL H 100-199   Triglycerides 172 mg/dL H 0-149   HDL Cholesterol 47 mg/dL  >39   VLDL Cholesterol Arnaldo 34 mg/dL  5-40   LDL Cholesterol Calc 126 mg/dL H 0-99   Cholesterol, Total 207 mg/dL H 100-199   Triglycerides 172 mg/dL H 0-149   HDL Cholesterol 47 mg/dL  >39   VLDL Cholesterol Arnaldo 34 mg/dL  5-40   LDL Cholesterol Calc 126 mg/dL H 0-99

## 2018-02-09 DIAGNOSIS — N40.0 ENLARGED PROSTATE: Primary | ICD-10-CM

## 2018-02-09 RX ORDER — TAMSULOSIN HYDROCHLORIDE 0.4 MG/1
CAPSULE ORAL
Qty: 90 CAPSULE | Refills: 1 | Status: SHIPPED | OUTPATIENT
Start: 2018-02-09 | End: 2018-08-03 | Stop reason: HOSPADM

## 2018-04-16 DIAGNOSIS — S96.912S STRAIN OF LEFT FOOT, SEQUELA: Primary | ICD-10-CM

## 2018-04-16 RX ORDER — PAROXETINE HYDROCHLORIDE 20 MG/1
1 TABLET, FILM COATED ORAL DAILY
COMMUNITY
Start: 2018-01-19 | End: 2018-08-03 | Stop reason: HOSPADM

## 2018-04-16 RX ORDER — OXYCODONE AND ACETAMINOPHEN 10; 325 MG/1; MG/1
1 TABLET ORAL EVERY 8 HOURS PRN
Qty: 90 TABLET | Refills: 0 | Status: SHIPPED | OUTPATIENT
Start: 2018-04-16 | End: 2018-05-15 | Stop reason: SDUPTHER

## 2018-04-16 RX ORDER — OXYCODONE AND ACETAMINOPHEN 10; 325 MG/1; MG/1
TABLET ORAL
COMMUNITY
Start: 2018-01-22 | End: 2018-04-16 | Stop reason: SDUPTHER

## 2018-04-16 RX ORDER — BUPROPION HYDROCHLORIDE 150 MG/1
1 TABLET, EXTENDED RELEASE ORAL 2 TIMES DAILY
COMMUNITY
Start: 2017-10-09 | End: 2018-08-03 | Stop reason: HOSPADM

## 2018-05-15 DIAGNOSIS — S96.912S STRAIN OF LEFT FOOT, SEQUELA: ICD-10-CM

## 2018-05-16 RX ORDER — OXYCODONE AND ACETAMINOPHEN 10; 325 MG/1; MG/1
1 TABLET ORAL EVERY 8 HOURS PRN
Qty: 90 TABLET | Refills: 0 | Status: SHIPPED | OUTPATIENT
Start: 2018-05-16 | End: 2018-06-15 | Stop reason: SDUPTHER

## 2018-06-15 DIAGNOSIS — S96.912S STRAIN OF LEFT FOOT, SEQUELA: ICD-10-CM

## 2018-06-18 RX ORDER — OXYCODONE AND ACETAMINOPHEN 10; 325 MG/1; MG/1
1 TABLET ORAL EVERY 8 HOURS PRN
Qty: 90 TABLET | Refills: 0 | Status: SHIPPED | OUTPATIENT
Start: 2018-06-18 | End: 2018-08-03 | Stop reason: SDUPTHER

## 2018-06-18 RX ORDER — TRAMADOL HYDROCHLORIDE 50 MG/1
TABLET ORAL
Qty: 120 TABLET | Refills: 0 | Status: SHIPPED | OUTPATIENT
Start: 2018-06-18 | End: 2018-07-15 | Stop reason: SDUPTHER

## 2018-07-15 DIAGNOSIS — S96.912S STRAIN OF LEFT FOOT, SEQUELA: ICD-10-CM

## 2018-07-16 RX ORDER — TRAMADOL HYDROCHLORIDE 50 MG/1
TABLET ORAL
Qty: 120 TABLET | Refills: 0 | Status: SHIPPED | OUTPATIENT
Start: 2018-07-16 | End: 2018-08-03 | Stop reason: SDUPTHER

## 2018-08-03 ENCOUNTER — OFFICE VISIT (OUTPATIENT)
Dept: FAMILY MEDICINE CLINIC | Facility: CLINIC | Age: 48
End: 2018-08-03
Payer: COMMERCIAL

## 2018-08-03 VITALS
WEIGHT: 188 LBS | SYSTOLIC BLOOD PRESSURE: 140 MMHG | HEART RATE: 87 BPM | OXYGEN SATURATION: 98 % | DIASTOLIC BLOOD PRESSURE: 82 MMHG | HEIGHT: 71 IN | BODY MASS INDEX: 26.32 KG/M2

## 2018-08-03 DIAGNOSIS — M54.42 CHRONIC BILATERAL LOW BACK PAIN WITH BILATERAL SCIATICA: ICD-10-CM

## 2018-08-03 DIAGNOSIS — M54.41 CHRONIC BILATERAL LOW BACK PAIN WITH BILATERAL SCIATICA: ICD-10-CM

## 2018-08-03 DIAGNOSIS — G89.4 CHRONIC PAIN SYNDROME: ICD-10-CM

## 2018-08-03 DIAGNOSIS — G47.00 INSOMNIA, UNSPECIFIED TYPE: Primary | ICD-10-CM

## 2018-08-03 DIAGNOSIS — G89.29 CHRONIC BILATERAL LOW BACK PAIN WITH BILATERAL SCIATICA: ICD-10-CM

## 2018-08-03 DIAGNOSIS — S96.912S STRAIN OF LEFT FOOT, SEQUELA: ICD-10-CM

## 2018-08-03 PROCEDURE — 99213 OFFICE O/P EST LOW 20 MIN: CPT | Performed by: FAMILY MEDICINE

## 2018-08-03 PROCEDURE — 3008F BODY MASS INDEX DOCD: CPT | Performed by: FAMILY MEDICINE

## 2018-08-03 RX ORDER — TRAMADOL HYDROCHLORIDE 50 MG/1
TABLET ORAL
Qty: 120 TABLET | Refills: 5 | Status: SHIPPED | OUTPATIENT
Start: 2018-08-03 | End: 2019-01-25 | Stop reason: SDUPTHER

## 2018-08-03 RX ORDER — OXYCODONE AND ACETAMINOPHEN 10; 325 MG/1; MG/1
1 TABLET ORAL EVERY 8 HOURS PRN
Qty: 90 TABLET | Refills: 0 | Status: SHIPPED | OUTPATIENT
Start: 2018-08-03 | End: 2018-09-04 | Stop reason: SDUPTHER

## 2018-08-03 RX ORDER — ZOLPIDEM TARTRATE 10 MG/1
10 TABLET ORAL DAILY
Qty: 30 TABLET | Refills: 5 | Status: SHIPPED | OUTPATIENT
Start: 2018-08-03 | End: 2019-01-22 | Stop reason: SDUPTHER

## 2018-08-03 NOTE — PROGRESS NOTES
[address] [facility]        NAME: Anastasiia Rodriguez is a 57 y o  male  : 1970    MRN: [id number]  DATE: August 3, 2018  TIME: 3:12 PM    Assessment and Plan   Insomnia, unspecified type [G47 00]  1  Insomnia, unspecified type  zolpidem (AMBIEN) 10 mg tablet   2  Strain of left foot, sequela  traMADol (ULTRAM) 50 mg tablet    oxyCODONE-acetaminophen (PERCOCET)  mg per tablet   3  Chronic pain syndrome     4  Chronic bilateral low back pain with bilateral sciatica           Patient Instructions     Patient Instructions   Pain stable on current meds          Chief Complaint     Chief Complaint   Patient presents with    Follow-up     6mos med-check         History of Present Illness       6 mo f/u for chronic pain--no change        Review of Systems   Review of Systems   Constitutional: Negative  Respiratory: Negative  Cardiovascular: Negative  Musculoskeletal: Positive for back pain  Neurological: Negative            Current Medications       Current Outpatient Prescriptions:     amLODIPine (NORVASC) 5 mg tablet, Take 1 tablet by mouth daily, Disp: , Rfl:     lisinopril-hydrochlorothiazide (PRINZIDE,ZESTORETIC) 20-25 MG per tablet, Take 1 tablet by mouth daily, Disp: , Rfl:     oxyCODONE-acetaminophen (PERCOCET)  mg per tablet, Take 1 tablet by mouth every 8 (eight) hours as needed for moderate pain Max Daily Amount: 3 tablets, Disp: 90 tablet, Rfl: 0    simvastatin (ZOCOR) 10 mg tablet, Take 1 tablet by mouth daily, Disp: , Rfl:     traMADol (ULTRAM) 50 mg tablet, 1 q 4 hrs prn, Disp: 120 tablet, Rfl: 5    zolpidem (AMBIEN) 10 mg tablet, Take 1 tablet (10 mg total) by mouth daily, Disp: 30 tablet, Rfl: 5    Current Allergies     Allergies as of 2018    (No Known Allergies)            The following portions of the patient's history were reviewed and updated as appropriate: allergies, current medications, past family history, past medical history, past social history, past surgical history and problem list      Past Medical History:   Diagnosis Date    Chronic pain     Hyperlipidemia     Hypertension        Past Surgical History:   Procedure Laterality Date    HERNIA REPAIR         Family History   Problem Relation Age of Onset    Hypertension Mother     Multiple sclerosis Mother     Hypertension Father     Diabetes Family     Stroke Family     Substance Abuse Neg Hx         family history    Mental illness Neg Hx         family history         Medications have been verified  Objective   /82   Pulse 87   Ht 5' 10 5" (1 791 m)   Wt 85 3 kg (188 lb)   SpO2 98%   BMI 26 59 kg/m²        Physical Exam     Physical Exam   Cardiovascular: Normal rate and normal heart sounds  Pulmonary/Chest: Effort normal and breath sounds normal    Musculoskeletal: Normal range of motion  He exhibits no deformity

## 2018-08-07 DIAGNOSIS — N40.0 ENLARGED PROSTATE: ICD-10-CM

## 2018-08-07 RX ORDER — TAMSULOSIN HYDROCHLORIDE 0.4 MG/1
CAPSULE ORAL
Qty: 90 CAPSULE | Refills: 1 | Status: SHIPPED | OUTPATIENT
Start: 2018-08-07 | End: 2019-01-18 | Stop reason: SDUPTHER

## 2018-09-04 DIAGNOSIS — S96.912S STRAIN OF LEFT FOOT, SEQUELA: ICD-10-CM

## 2018-09-04 RX ORDER — OXYCODONE AND ACETAMINOPHEN 10; 325 MG/1; MG/1
1 TABLET ORAL EVERY 8 HOURS PRN
Qty: 90 TABLET | Refills: 0 | Status: SHIPPED | OUTPATIENT
Start: 2018-09-04 | End: 2018-10-01 | Stop reason: SDUPTHER

## 2018-10-01 DIAGNOSIS — S96.912S STRAIN OF LEFT FOOT, SEQUELA: ICD-10-CM

## 2018-10-01 RX ORDER — OXYCODONE AND ACETAMINOPHEN 10; 325 MG/1; MG/1
1 TABLET ORAL EVERY 8 HOURS PRN
Qty: 90 TABLET | Refills: 0 | Status: SHIPPED | OUTPATIENT
Start: 2018-10-01 | End: 2018-10-30 | Stop reason: SDUPTHER

## 2018-10-30 DIAGNOSIS — S96.912S STRAIN OF LEFT FOOT, SEQUELA: ICD-10-CM

## 2018-10-30 RX ORDER — OXYCODONE AND ACETAMINOPHEN 10; 325 MG/1; MG/1
1 TABLET ORAL EVERY 8 HOURS PRN
Qty: 90 TABLET | Refills: 0 | Status: SHIPPED | OUTPATIENT
Start: 2018-11-01 | End: 2018-11-27 | Stop reason: SDUPTHER

## 2018-11-27 DIAGNOSIS — S96.912S STRAIN OF LEFT FOOT, SEQUELA: ICD-10-CM

## 2018-11-27 RX ORDER — OXYCODONE AND ACETAMINOPHEN 10; 325 MG/1; MG/1
1 TABLET ORAL EVERY 8 HOURS PRN
Qty: 90 TABLET | Refills: 0 | Status: SHIPPED | OUTPATIENT
Start: 2018-11-30 | End: 2018-12-19 | Stop reason: SDUPTHER

## 2018-12-19 ENCOUNTER — OFFICE VISIT (OUTPATIENT)
Dept: FAMILY MEDICINE CLINIC | Facility: CLINIC | Age: 48
End: 2018-12-19
Payer: COMMERCIAL

## 2018-12-19 VITALS
DIASTOLIC BLOOD PRESSURE: 82 MMHG | WEIGHT: 190 LBS | HEIGHT: 71 IN | BODY MASS INDEX: 26.6 KG/M2 | SYSTOLIC BLOOD PRESSURE: 128 MMHG | OXYGEN SATURATION: 94 % | HEART RATE: 88 BPM

## 2018-12-19 DIAGNOSIS — Z00.00 WELLNESS EXAMINATION: ICD-10-CM

## 2018-12-19 DIAGNOSIS — J01.00 ACUTE NON-RECURRENT MAXILLARY SINUSITIS: Primary | ICD-10-CM

## 2018-12-19 DIAGNOSIS — S96.912S STRAIN OF LEFT FOOT, SEQUELA: ICD-10-CM

## 2018-12-19 PROCEDURE — 99396 PREV VISIT EST AGE 40-64: CPT | Performed by: FAMILY MEDICINE

## 2018-12-19 PROCEDURE — 99213 OFFICE O/P EST LOW 20 MIN: CPT | Performed by: FAMILY MEDICINE

## 2018-12-19 PROCEDURE — 3008F BODY MASS INDEX DOCD: CPT | Performed by: FAMILY MEDICINE

## 2018-12-19 RX ORDER — AMOXICILLIN AND CLAVULANATE POTASSIUM 875; 125 MG/1; MG/1
1 TABLET, FILM COATED ORAL EVERY 12 HOURS SCHEDULED
Qty: 14 TABLET | Refills: 0 | Status: SHIPPED | OUTPATIENT
Start: 2018-12-19 | End: 2018-12-26

## 2018-12-19 RX ORDER — OXYCODONE AND ACETAMINOPHEN 10; 325 MG/1; MG/1
1 TABLET ORAL EVERY 8 HOURS PRN
Qty: 90 TABLET | Refills: 0 | Status: SHIPPED | OUTPATIENT
Start: 2018-12-19 | End: 2019-01-21

## 2018-12-19 RX ORDER — DIAZEPAM 5 MG/1
5 TABLET ORAL EVERY 12 HOURS PRN
Qty: 30 TABLET | Refills: 2 | Status: SHIPPED | OUTPATIENT
Start: 2018-12-19 | End: 2019-01-21 | Stop reason: SDUPTHER

## 2018-12-19 NOTE — PROGRESS NOTES
[facility]        NAME: Leigh Tomlinson is a 57 y o  male  : 1970    MRN: [id number]  DATE: 2018  TIME: 4:09 PM    Assessment and Plan   Acute non-recurrent maxillary sinusitis [J01 00]  1  Acute non-recurrent maxillary sinusitis  amoxicillin-clavulanate (AUGMENTIN) 875-125 mg per tablet   2  Strain of left foot, sequela  oxyCODONE-acetaminophen (PERCOCET)  mg per tablet    diazepam (VALIUM) 5 mg tablet         Patient Instructions     Patient Instructions   F/u prn          Chief Complaint     Chief Complaint   Patient presents with    Cough     1 month--congestion         History of Present Illness       C/o sinusitis        Review of Systems   Review of Systems   Constitutional: Positive for fever  Negative for fatigue and unexpected weight change  HENT: Positive for postnasal drip, rhinorrhea, sinus pain, sinus pressure and sore throat  Negative for congestion  Eyes: Negative for visual disturbance  Respiratory: Positive for cough  Negative for shortness of breath and wheezing  Cardiovascular: Negative for chest pain and palpitations  Gastrointestinal: Negative for abdominal pain, diarrhea, nausea and vomiting           Current Medications       Current Outpatient Prescriptions:     amLODIPine (NORVASC) 5 mg tablet, Take 1 tablet by mouth daily, Disp: , Rfl:     amoxicillin-clavulanate (AUGMENTIN) 875-125 mg per tablet, Take 1 tablet by mouth every 12 (twelve) hours for 7 days, Disp: 14 tablet, Rfl: 0    diazepam (VALIUM) 5 mg tablet, Take 1 tablet (5 mg total) by mouth every 12 (twelve) hours as needed for anxiety, Disp: 30 tablet, Rfl: 2    lisinopril-hydrochlorothiazide (PRINZIDE,ZESTORETIC) 20-25 MG per tablet, Take 1 tablet by mouth daily, Disp: , Rfl:     oxyCODONE-acetaminophen (PERCOCET)  mg per tablet, Take 1 tablet by mouth every 8 (eight) hours as needed for moderate pain Max Daily Amount: 3 tablets, Disp: 90 tablet, Rfl: 0    simvastatin (ZOCOR) 10 mg tablet, Take 1 tablet by mouth daily, Disp: , Rfl:     tamsulosin (FLOMAX) 0 4 mg, take 1 capsule by mouth once daily, Disp: 90 capsule, Rfl: 1    traMADol (ULTRAM) 50 mg tablet, 1 q 4 hrs prn, Disp: 120 tablet, Rfl: 5    zolpidem (AMBIEN) 10 mg tablet, Take 1 tablet (10 mg total) by mouth daily, Disp: 30 tablet, Rfl: 5    Current Allergies     Allergies as of 12/19/2018    (No Known Allergies)            The following portions of the patient's history were reviewed and updated as appropriate: allergies, current medications, past family history, past medical history, past social history, past surgical history and problem list      Past Medical History:   Diagnosis Date    Chronic pain     Hyperlipidemia     Hypertension        Past Surgical History:   Procedure Laterality Date    HERNIA REPAIR         Family History   Problem Relation Age of Onset    Hypertension Mother     Multiple sclerosis Mother     Hypertension Father     Diabetes Family     Stroke Family     Substance Abuse Neg Hx         family history    Mental illness Neg Hx         family history         Medications have been verified  Objective   /82   Pulse 88   Ht 5' 10 5" (1 791 m)   Wt 86 2 kg (190 lb)   SpO2 94%   BMI 26 88 kg/m²        Physical Exam     Physical Exam   Constitutional: He is oriented to person, place, and time  He appears well-developed and well-nourished  No distress  HENT:   Head: Normocephalic and atraumatic  Right Ear: Tympanic membrane, external ear and ear canal normal    Left Ear: Tympanic membrane, external ear and ear canal normal    Nose: Rhinorrhea present  No epistaxis  Right sinus exhibits maxillary sinus tenderness  Left sinus exhibits maxillary sinus tenderness  Mouth/Throat: Uvula is midline, oropharynx is clear and moist and mucous membranes are normal    Cardiovascular: Normal rate, regular rhythm and normal heart sounds    Exam reveals no gallop and no friction rub  No murmur heard  Pulmonary/Chest: Effort normal and breath sounds normal  No respiratory distress  He has no wheezes  He has no rales  Abdominal: He exhibits no distension  Musculoskeletal: Normal range of motion  Neurological: He is alert and oriented to person, place, and time  Skin: He is not diaphoretic  Psychiatric: He has a normal mood and affect  His behavior is normal  Judgment and thought content normal    Nursing note and vitals reviewed

## 2018-12-19 NOTE — PROGRESS NOTES
HPI:  Lester Roman is a 57 y o  male here for his yearly health maintenance exam    There is no problem list on file for this patient  Past Medical History:   Diagnosis Date    Chronic pain     Hyperlipidemia     Hypertension        1  Advanced Directive: n     2  Durable Power of  for Healthcare: n     3  Social History:           Drug and alcohol History: n                  4  Immunizations up to date: y                 Lifestyle:                           Healthy Diet:y                          Alcohol Use:y                          Tobacco Use:n                          Regular exercise:y                          Weight concerns:n                               5  Over the past 2 weeks, how often have you been bothered by the following:              Little interest or pleasure in doing things:n              Felling down, depressed or hopeless:n       Current Outpatient Prescriptions   Medication Sig Dispense Refill    amLODIPine (NORVASC) 5 mg tablet Take 1 tablet by mouth daily      amoxicillin-clavulanate (AUGMENTIN) 875-125 mg per tablet Take 1 tablet by mouth every 12 (twelve) hours for 7 days 14 tablet 0    diazepam (VALIUM) 5 mg tablet Take 1 tablet (5 mg total) by mouth every 12 (twelve) hours as needed for anxiety 30 tablet 2    lisinopril-hydrochlorothiazide (PRINZIDE,ZESTORETIC) 20-25 MG per tablet Take 1 tablet by mouth daily      oxyCODONE-acetaminophen (PERCOCET)  mg per tablet Take 1 tablet by mouth every 8 (eight) hours as needed for moderate pain Max Daily Amount: 3 tablets 90 tablet 0    simvastatin (ZOCOR) 10 mg tablet Take 1 tablet by mouth daily      tamsulosin (FLOMAX) 0 4 mg take 1 capsule by mouth once daily 90 capsule 1    traMADol (ULTRAM) 50 mg tablet 1 q 4 hrs prn 120 tablet 5    zolpidem (AMBIEN) 10 mg tablet Take 1 tablet (10 mg total) by mouth daily 30 tablet 5     No current facility-administered medications for this visit        No Known Allergies  There is no immunization history for the selected administration types on file for this patient  Patient Care Team:  Ting Galloway MD as PCP - General    Review of Systems   Constitutional: Negative for chills and fever  HENT: Negative for congestion and sinus pressure  Eyes: Negative for visual disturbance  Respiratory: Negative for shortness of breath and wheezing  Cardiovascular: Negative for chest pain  Gastrointestinal: Negative for abdominal pain  Physical Exam :  Physical Exam   Constitutional: He is oriented to person, place, and time  Vital signs are normal  He appears well-developed and well-nourished  No distress  HENT:   Right Ear: Ear canal normal  Tympanic membrane is not injected  Left Ear: Ear canal normal  Tympanic membrane is not injected  Nose: Nose normal    Mouth/Throat: Oropharynx is clear and moist  No oropharyngeal exudate  Eyes: Pupils are equal, round, and reactive to light  Conjunctivae are normal    Neck: Normal range of motion  Neck supple  No thyromegaly present  Cardiovascular: Normal rate, regular rhythm and normal heart sounds  No murmur heard  Pulmonary/Chest: Effort normal and breath sounds normal  No respiratory distress  He has no wheezes  Abdominal: Soft  Bowel sounds are normal  He exhibits no mass  There is no splenomegaly or hepatomegaly  There is no tenderness  Musculoskeletal: Normal range of motion  He exhibits no edema, tenderness or deformity  Neurological: He is alert and oriented to person, place, and time  He has normal strength  He is not disoriented  No sensory deficit  Gait normal    Skin: Skin is warm and dry  No rash noted  He is not diaphoretic  No pallor  Psychiatric: He has a normal mood and affect  His speech is normal and behavior is normal  Cognition and memory are normal          Assessment and Plan:  1   Acute non-recurrent maxillary sinusitis  amoxicillin-clavulanate (AUGMENTIN) 875-125 mg per tablet 2  Strain of left foot, sequela  oxyCODONE-acetaminophen (PERCOCET)  mg per tablet    diazepam (VALIUM) 5 mg tablet   3   Wellness examination         Health Maintenance Due   Topic Date Due    Depression Screening PHQ  1970    Pneumococcal PPSV23 Medium Risk Adult (1 of 1 - PPSV23) 07/28/1991    DTaP,Tdap,and Td Vaccines (1 - Tdap) 07/28/1993    INFLUENZA VACCINE  07/01/2018

## 2019-01-15 DIAGNOSIS — R05.9 COUGH: Primary | ICD-10-CM

## 2019-01-15 RX ORDER — PREDNISONE 20 MG/1
TABLET ORAL
Qty: 15 TABLET | Refills: 0 | Status: SHIPPED | OUTPATIENT
Start: 2019-01-15 | End: 2019-04-14

## 2019-01-15 NOTE — TELEPHONE ENCOUNTER
Still has not been feeling well 3 antibotics later not working would like steroid inhaler  Still coughing     Ra/bottom $

## 2019-01-18 DIAGNOSIS — N40.0 ENLARGED PROSTATE: ICD-10-CM

## 2019-01-19 RX ORDER — TAMSULOSIN HYDROCHLORIDE 0.4 MG/1
CAPSULE ORAL
Qty: 90 CAPSULE | Refills: 0 | Status: SHIPPED | OUTPATIENT
Start: 2019-01-19 | End: 2019-04-14

## 2019-01-21 ENCOUNTER — OFFICE VISIT (OUTPATIENT)
Dept: FAMILY MEDICINE CLINIC | Facility: CLINIC | Age: 49
End: 2019-01-21
Payer: COMMERCIAL

## 2019-01-21 VITALS
OXYGEN SATURATION: 97 % | BODY MASS INDEX: 26.6 KG/M2 | DIASTOLIC BLOOD PRESSURE: 80 MMHG | SYSTOLIC BLOOD PRESSURE: 128 MMHG | HEIGHT: 71 IN | WEIGHT: 190 LBS | HEART RATE: 87 BPM

## 2019-01-21 DIAGNOSIS — M54.41 CHRONIC RIGHT-SIDED LOW BACK PAIN WITH RIGHT-SIDED SCIATICA: ICD-10-CM

## 2019-01-21 DIAGNOSIS — F11.20 UNCOMPLICATED OPIOID DEPENDENCE (HCC): ICD-10-CM

## 2019-01-21 DIAGNOSIS — M53.3 COCCYXDYNIA: ICD-10-CM

## 2019-01-21 DIAGNOSIS — I10 ESSENTIAL HYPERTENSION: Primary | ICD-10-CM

## 2019-01-21 DIAGNOSIS — S96.912S STRAIN OF LEFT FOOT, SEQUELA: ICD-10-CM

## 2019-01-21 DIAGNOSIS — G89.29 OTHER CHRONIC PAIN: Primary | ICD-10-CM

## 2019-01-21 DIAGNOSIS — G89.29 CHRONIC RIGHT-SIDED LOW BACK PAIN WITH RIGHT-SIDED SCIATICA: ICD-10-CM

## 2019-01-21 PROCEDURE — 99213 OFFICE O/P EST LOW 20 MIN: CPT | Performed by: FAMILY MEDICINE

## 2019-01-21 PROCEDURE — 3008F BODY MASS INDEX DOCD: CPT | Performed by: FAMILY MEDICINE

## 2019-01-21 RX ORDER — AMLODIPINE BESYLATE 5 MG/1
TABLET ORAL
Qty: 90 TABLET | Refills: 3 | Status: SHIPPED | OUTPATIENT
Start: 2019-01-21 | End: 2019-05-22 | Stop reason: ALTCHOICE

## 2019-01-21 RX ORDER — LISINOPRIL AND HYDROCHLOROTHIAZIDE 25; 20 MG/1; MG/1
TABLET ORAL
Qty: 90 TABLET | Refills: 3 | Status: SHIPPED | OUTPATIENT
Start: 2019-01-21 | End: 2019-04-18 | Stop reason: HOSPADM

## 2019-01-21 RX ORDER — OXYCODONE HYDROCHLORIDE AND ACETAMINOPHEN 5; 325 MG/1; MG/1
TABLET ORAL
Qty: 150 TABLET | Refills: 0 | Status: SHIPPED | OUTPATIENT
Start: 2019-01-21 | End: 2019-02-20 | Stop reason: SDUPTHER

## 2019-01-21 RX ORDER — DIAZEPAM 5 MG/1
5 TABLET ORAL EVERY 8 HOURS PRN
Qty: 90 TABLET | Refills: 3 | Status: SHIPPED | OUTPATIENT
Start: 2019-01-21 | End: 2019-04-23 | Stop reason: SDUPTHER

## 2019-01-21 NOTE — PATIENT INSTRUCTIONS
5 + years chronic sciatica/coccyxdynia---pain MD consultations x2/interventional proc X2---no relief----pt has been stable on current regimen--will attempt taper per CDC guidelines

## 2019-01-21 NOTE — PROGRESS NOTES
[facility]        NAME: Lester Roman is a 57 y o  male  : 1970    MRN: [id number]  DATE: 2019  TIME: 3:35 PM    Assessment and Plan   Other chronic pain [G89 29]  1  Other chronic pain  oxyCODONE-acetaminophen (PERCOCET) 5-325 mg per tablet   2  Uncomplicated opioid dependence ([address] )     3  Chronic right-sided low back pain with right-sided sciatica     4  Coccyxdynia     5  Strain of left foot, sequela  diazepam (VALIUM) 5 mg tablet         Patient Instructions     Patient Instructions   5 + years chronic sciatica/coccyxdynia---pain MD consultations x2/interventional proc X2---no relief----pt has been stable on current regimen--will attempt taper per CDC guidelines          Chief Complaint     Chief Complaint   Patient presents with    Follow-up     discuss Pain Meds--pt is due BW         History of Present Illness       See wrap up---appt to disc opioid taper        Review of Systems   Review of Systems   Constitutional: Negative for fatigue, fever and unexpected weight change  HENT: Negative for congestion, sinus pressure and sore throat  Eyes: Negative for visual disturbance  Respiratory: Negative for shortness of breath and wheezing  Cardiovascular: Negative for chest pain and palpitations  Gastrointestinal: Negative for abdominal pain, diarrhea, nausea and vomiting           Current Medications       Current Outpatient Prescriptions:     amLODIPine (NORVASC) 5 mg tablet, take 1 tablet by mouth once daily, Disp: 90 tablet, Rfl: 3    diazepam (VALIUM) 5 mg tablet, Take 1 tablet (5 mg total) by mouth every 8 (eight) hours as needed for anxiety, Disp: 90 tablet, Rfl: 3    lisinopril-hydrochlorothiazide (PRINZIDE,ZESTORETIC) 20-25 MG per tablet, take 1 tablet by mouth once daily, Disp: 90 tablet, Rfl: 3    oxyCODONE-acetaminophen (PERCOCET) 5-325 mg per tablet, 1 q 4hrs prn--max 5/day, Disp: 150 tablet, Rfl: 0    predniSONE 20 mg tablet, Take 1 tablet BID x 5days, then Take 1 Tablet QD x 5days, Disp: 15 tablet, Rfl: 0    simvastatin (ZOCOR) 10 mg tablet, Take 1 tablet by mouth daily, Disp: , Rfl:     tamsulosin (FLOMAX) 0 4 mg, take 1 capsule by mouth once daily, Disp: 90 capsule, Rfl: 0    traMADol (ULTRAM) 50 mg tablet, 1 q 4 hrs prn, Disp: 120 tablet, Rfl: 5    zolpidem (AMBIEN) 10 mg tablet, Take 1 tablet (10 mg total) by mouth daily, Disp: 30 tablet, Rfl: 5    Current Allergies     Allergies as of 01/21/2019    (No Known Allergies)            The following portions of the patient's history were reviewed and updated as appropriate: allergies, current medications, past family history, past medical history, past social history, past surgical history and problem list      Past Medical History:   Diagnosis Date    Chronic pain     Hyperlipidemia     Hypertension        Past Surgical History:   Procedure Laterality Date    HERNIA REPAIR         Family History   Problem Relation Age of Onset    Hypertension Mother     Multiple sclerosis Mother     Hypertension Father     Diabetes Family     Stroke Family     Substance Abuse Neg Hx         family history    Mental illness Neg Hx         family history         Medications have been verified  Objective   /80   Pulse 87   Ht 5' 10 5" (1 791 m)   Wt 86 2 kg (190 lb)   SpO2 97%   BMI 26 88 kg/m²        Physical Exam     Physical Exam   Constitutional: He appears well-developed and well-nourished  No distress  HENT:   Right Ear: Tympanic membrane, external ear and ear canal normal  Tympanic membrane is not injected  Left Ear: Tympanic membrane, external ear and ear canal normal  Tympanic membrane is not injected  Nose: Nose normal    Mouth/Throat: Uvula is midline, oropharynx is clear and moist and mucous membranes are normal    Eyes: Pupils are equal, round, and reactive to light  Conjunctivae are normal    Neck: Normal range of motion  Neck supple  No thyromegaly present  Cardiovascular: Normal rate, regular rhythm and normal heart sounds  No murmur heard  Pulmonary/Chest: Effort normal and breath sounds normal  No respiratory distress  He has no wheezes  Musculoskeletal:   Exam deferred--no change in pain   Lymphadenopathy:     He has no cervical adenopathy  Skin: He is not diaphoretic

## 2019-01-22 DIAGNOSIS — G47.00 INSOMNIA, UNSPECIFIED TYPE: ICD-10-CM

## 2019-01-23 RX ORDER — ZOLPIDEM TARTRATE 10 MG/1
TABLET ORAL
Qty: 30 TABLET | Refills: 5 | Status: SHIPPED | OUTPATIENT
Start: 2019-01-23 | End: 2019-07-21 | Stop reason: SDUPTHER

## 2019-01-25 DIAGNOSIS — S96.912S STRAIN OF LEFT FOOT, SEQUELA: ICD-10-CM

## 2019-01-25 RX ORDER — TRAMADOL HYDROCHLORIDE 50 MG/1
TABLET ORAL
Qty: 120 TABLET | Refills: 5 | Status: SHIPPED | OUTPATIENT
Start: 2019-01-25 | End: 2019-07-08 | Stop reason: SDUPTHER

## 2019-02-02 DIAGNOSIS — E78.01 FAMILIAL HYPERCHOLESTEROLEMIA: Primary | ICD-10-CM

## 2019-02-02 RX ORDER — SIMVASTATIN 10 MG
TABLET ORAL
Qty: 90 TABLET | Refills: 3 | Status: SHIPPED | OUTPATIENT
Start: 2019-02-02 | End: 2019-04-18 | Stop reason: HOSPADM

## 2019-02-20 DIAGNOSIS — G89.29 OTHER CHRONIC PAIN: ICD-10-CM

## 2019-02-21 RX ORDER — OXYCODONE HYDROCHLORIDE AND ACETAMINOPHEN 5; 325 MG/1; MG/1
TABLET ORAL
Qty: 120 TABLET | Refills: 0 | Status: SHIPPED | OUTPATIENT
Start: 2019-02-21 | End: 2019-03-21 | Stop reason: SDUPTHER

## 2019-03-19 ENCOUNTER — OCCMED (OUTPATIENT)
Dept: URGENT CARE | Facility: CLINIC | Age: 49
End: 2019-03-19
Payer: OTHER MISCELLANEOUS

## 2019-03-19 DIAGNOSIS — S61.219A LACERATION WITHOUT FOREIGN BODY OF UNSPECIFIED FINGER WITHOUT DAMAGE TO NAIL, INITIAL ENCOUNTER: Primary | ICD-10-CM

## 2019-03-19 PROCEDURE — 90715 TDAP VACCINE 7 YRS/> IM: CPT

## 2019-03-19 PROCEDURE — G0382 LEV 3 HOSP TYPE B ED VISIT: HCPCS | Performed by: PHYSICIAN ASSISTANT

## 2019-03-19 PROCEDURE — 99283 EMERGENCY DEPT VISIT LOW MDM: CPT | Performed by: PHYSICIAN ASSISTANT

## 2019-03-19 RX ORDER — LIDOCAINE HYDROCHLORIDE 20 MG/ML
5 INJECTION, SOLUTION EPIDURAL; INFILTRATION; INTRACAUDAL; PERINEURAL ONCE
Status: DISCONTINUED | OUTPATIENT
Start: 2019-03-19 | End: 2019-03-19

## 2019-03-21 ENCOUNTER — APPOINTMENT (OUTPATIENT)
Dept: URGENT CARE | Facility: CLINIC | Age: 49
End: 2019-03-21
Payer: OTHER MISCELLANEOUS

## 2019-03-21 DIAGNOSIS — G89.29 OTHER CHRONIC PAIN: ICD-10-CM

## 2019-03-21 PROCEDURE — 99213 OFFICE O/P EST LOW 20 MIN: CPT | Performed by: FAMILY MEDICINE

## 2019-03-23 RX ORDER — OXYCODONE HYDROCHLORIDE AND ACETAMINOPHEN 5; 325 MG/1; MG/1
TABLET ORAL
Qty: 120 TABLET | Refills: 0 | Status: SHIPPED | OUTPATIENT
Start: 2019-03-23 | End: 2019-04-18 | Stop reason: SDUPTHER

## 2019-03-29 ENCOUNTER — APPOINTMENT (OUTPATIENT)
Dept: URGENT CARE | Facility: CLINIC | Age: 49
End: 2019-03-29
Payer: OTHER MISCELLANEOUS

## 2019-03-29 PROCEDURE — 99213 OFFICE O/P EST LOW 20 MIN: CPT | Performed by: PHYSICIAN ASSISTANT

## 2019-04-14 ENCOUNTER — HOSPITAL ENCOUNTER (INPATIENT)
Facility: HOSPITAL | Age: 49
LOS: 4 days | Discharge: HOME | DRG: 065 | End: 2019-04-18
Attending: EMERGENCY MEDICINE | Admitting: INTERNAL MEDICINE
Payer: COMMERCIAL

## 2019-04-14 ENCOUNTER — APPOINTMENT (EMERGENCY)
Dept: CT IMAGING | Facility: HOSPITAL | Age: 49
DRG: 065 | End: 2019-04-14
Payer: COMMERCIAL

## 2019-04-14 ENCOUNTER — APPOINTMENT (EMERGENCY)
Dept: RADIOLOGY | Facility: HOSPITAL | Age: 49
DRG: 065 | End: 2019-04-14
Payer: COMMERCIAL

## 2019-04-14 DIAGNOSIS — I63.9 STROKE (CEREBRUM) (HCC): Primary | ICD-10-CM

## 2019-04-14 DIAGNOSIS — R71.8 ELEVATED RED BLOOD CELL COUNT: ICD-10-CM

## 2019-04-14 DIAGNOSIS — I16.1 HYPERTENSIVE EMERGENCY: ICD-10-CM

## 2019-04-14 DIAGNOSIS — Z72.0 TOBACCO ABUSE: ICD-10-CM

## 2019-04-14 DIAGNOSIS — I63.9 CVA (CEREBRAL VASCULAR ACCIDENT) (HCC): ICD-10-CM

## 2019-04-14 PROBLEM — I10 HYPERTENSION: Status: ACTIVE | Noted: 2019-04-14

## 2019-04-14 LAB
AMPHETAMINES SERPL QL SCN: NEGATIVE
ANION GAP SERPL CALCULATED.3IONS-SCNC: 6 MMOL/L (ref 4–13)
APTT PPP: 29 SECONDS (ref 26–38)
APTT PPP: 29 SECONDS (ref 26–38)
APTT PPP: 46 SECONDS (ref 26–38)
ATRIAL RATE: 84 BPM
BARBITURATES UR QL: NEGATIVE
BENZODIAZ UR QL: NEGATIVE
BUN SERPL-MCNC: 10 MG/DL (ref 5–25)
CALCIUM SERPL-MCNC: 9.7 MG/DL (ref 8.3–10.1)
CHLORIDE SERPL-SCNC: 103 MMOL/L (ref 100–108)
CO2 SERPL-SCNC: 33 MMOL/L (ref 21–32)
COCAINE UR QL: NEGATIVE
CREAT SERPL-MCNC: 0.92 MG/DL (ref 0.6–1.3)
ERYTHROCYTE [DISTWIDTH] IN BLOOD BY AUTOMATED COUNT: 13.5 % (ref 11.6–15.1)
EST. AVERAGE GLUCOSE BLD GHB EST-MCNC: 114 MG/DL
FERRITIN SERPL-MCNC: 73 NG/ML (ref 8–388)
GFR SERPL CREATININE-BSD FRML MDRD: 99 ML/MIN/1.73SQ M
GLUCOSE SERPL-MCNC: 106 MG/DL (ref 65–140)
HBA1C MFR BLD: 5.6 % (ref 4.2–6.3)
HCT VFR BLD AUTO: 52.2 % (ref 36.5–49.3)
HGB BLD-MCNC: 17.7 G/DL (ref 12–17)
INR PPP: 0.97 (ref 0.86–1.17)
MCH RBC QN AUTO: 31.1 PG (ref 26.8–34.3)
MCHC RBC AUTO-ENTMCNC: 33.9 G/DL (ref 31.4–37.4)
MCV RBC AUTO: 92 FL (ref 82–98)
METHADONE UR QL: NEGATIVE
OPIATES UR QL SCN: NEGATIVE
P AXIS: 52 DEGREES
PCP UR QL: NEGATIVE
PLATELET # BLD AUTO: 189 THOUSANDS/UL (ref 149–390)
PLATELET # BLD AUTO: 194 THOUSANDS/UL (ref 149–390)
PMV BLD AUTO: 11.1 FL (ref 8.9–12.7)
PMV BLD AUTO: 11.4 FL (ref 8.9–12.7)
POTASSIUM SERPL-SCNC: 4.3 MMOL/L (ref 3.5–5.3)
PR INTERVAL: 132 MS
PROTHROMBIN TIME: 12.3 SECONDS (ref 11.8–14.2)
QRS AXIS: 2 DEGREES
QRSD INTERVAL: 92 MS
QT INTERVAL: 364 MS
QTC INTERVAL: 430 MS
RBC # BLD AUTO: 5.7 MILLION/UL (ref 3.88–5.62)
SODIUM SERPL-SCNC: 142 MMOL/L (ref 136–145)
T WAVE AXIS: 35 DEGREES
THC UR QL: NEGATIVE
TSH SERPL DL<=0.05 MIU/L-ACNC: 0.76 UIU/ML (ref 0.36–3.74)
VENTRICULAR RATE: 84 BPM
VIT B12 SERPL-MCNC: 496 PG/ML (ref 100–900)
WBC # BLD AUTO: 10.02 THOUSAND/UL (ref 4.31–10.16)

## 2019-04-14 PROCEDURE — 92610 EVALUATE SWALLOWING FUNCTION: CPT

## 2019-04-14 PROCEDURE — 85610 PROTHROMBIN TIME: CPT | Performed by: EMERGENCY MEDICINE

## 2019-04-14 PROCEDURE — 80307 DRUG TEST PRSMV CHEM ANLYZR: CPT | Performed by: PSYCHIATRY & NEUROLOGY

## 2019-04-14 PROCEDURE — 92522 EVALUATE SPEECH PRODUCTION: CPT

## 2019-04-14 PROCEDURE — 85027 COMPLETE CBC AUTOMATED: CPT | Performed by: EMERGENCY MEDICINE

## 2019-04-14 PROCEDURE — G8996 SWALLOW CURRENT STATUS: HCPCS

## 2019-04-14 PROCEDURE — 70498 CT ANGIOGRAPHY NECK: CPT

## 2019-04-14 PROCEDURE — 71045 X-RAY EXAM CHEST 1 VIEW: CPT

## 2019-04-14 PROCEDURE — 70496 CT ANGIOGRAPHY HEAD: CPT

## 2019-04-14 PROCEDURE — 93010 ELECTROCARDIOGRAM REPORT: CPT | Performed by: INTERNAL MEDICINE

## 2019-04-14 PROCEDURE — 99285 EMERGENCY DEPT VISIT HI MDM: CPT

## 2019-04-14 PROCEDURE — 36415 COLL VENOUS BLD VENIPUNCTURE: CPT | Performed by: EMERGENCY MEDICINE

## 2019-04-14 PROCEDURE — 82728 ASSAY OF FERRITIN: CPT | Performed by: INTERNAL MEDICINE

## 2019-04-14 PROCEDURE — 99223 1ST HOSP IP/OBS HIGH 75: CPT | Performed by: INTERNAL MEDICINE

## 2019-04-14 PROCEDURE — 82607 VITAMIN B-12: CPT | Performed by: PSYCHIATRY & NEUROLOGY

## 2019-04-14 PROCEDURE — 80048 BASIC METABOLIC PNL TOTAL CA: CPT | Performed by: EMERGENCY MEDICINE

## 2019-04-14 PROCEDURE — 93005 ELECTROCARDIOGRAM TRACING: CPT

## 2019-04-14 PROCEDURE — 85730 THROMBOPLASTIN TIME PARTIAL: CPT | Performed by: EMERGENCY MEDICINE

## 2019-04-14 PROCEDURE — 85730 THROMBOPLASTIN TIME PARTIAL: CPT | Performed by: INTERNAL MEDICINE

## 2019-04-14 PROCEDURE — 99245 OFF/OP CONSLTJ NEW/EST HI 55: CPT | Performed by: PSYCHIATRY & NEUROLOGY

## 2019-04-14 PROCEDURE — 85049 AUTOMATED PLATELET COUNT: CPT | Performed by: PSYCHIATRY & NEUROLOGY

## 2019-04-14 PROCEDURE — 84443 ASSAY THYROID STIM HORMONE: CPT | Performed by: PSYCHIATRY & NEUROLOGY

## 2019-04-14 PROCEDURE — 99285 EMERGENCY DEPT VISIT HI MDM: CPT | Performed by: EMERGENCY MEDICINE

## 2019-04-14 PROCEDURE — G8998 SWALLOW D/C STATUS: HCPCS

## 2019-04-14 PROCEDURE — 96375 TX/PRO/DX INJ NEW DRUG ADDON: CPT

## 2019-04-14 PROCEDURE — 96365 THER/PROPH/DIAG IV INF INIT: CPT

## 2019-04-14 PROCEDURE — G8997 SWALLOW GOAL STATUS: HCPCS

## 2019-04-14 PROCEDURE — 85730 THROMBOPLASTIN TIME PARTIAL: CPT | Performed by: PSYCHIATRY & NEUROLOGY

## 2019-04-14 PROCEDURE — 83036 HEMOGLOBIN GLYCOSYLATED A1C: CPT | Performed by: PSYCHIATRY & NEUROLOGY

## 2019-04-14 RX ORDER — HEPARIN SODIUM 1000 [USP'U]/ML
1000 INJECTION, SOLUTION INTRAVENOUS; SUBCUTANEOUS ONCE
Status: COMPLETED | OUTPATIENT
Start: 2019-04-14 | End: 2019-04-14

## 2019-04-14 RX ORDER — HEPARIN SODIUM 10000 [USP'U]/100ML
300-2000 INJECTION, SOLUTION INTRAVENOUS
Status: DISCONTINUED | OUTPATIENT
Start: 2019-04-14 | End: 2019-04-18 | Stop reason: HOSPADM

## 2019-04-14 RX ORDER — ZOLPIDEM TARTRATE 5 MG/1
10 TABLET ORAL
Status: DISCONTINUED | OUTPATIENT
Start: 2019-04-14 | End: 2019-04-18 | Stop reason: HOSPADM

## 2019-04-14 RX ORDER — METOPROLOL TARTRATE 5 MG/5ML
10 INJECTION INTRAVENOUS ONCE
Status: COMPLETED | OUTPATIENT
Start: 2019-04-14 | End: 2019-04-14

## 2019-04-14 RX ORDER — SODIUM CHLORIDE 9 MG/ML
125 INJECTION, SOLUTION INTRAVENOUS CONTINUOUS
Status: DISCONTINUED | OUTPATIENT
Start: 2019-04-14 | End: 2019-04-16

## 2019-04-14 RX ORDER — OXYCODONE HYDROCHLORIDE AND ACETAMINOPHEN 5; 325 MG/1; MG/1
1 TABLET ORAL EVERY 6 HOURS PRN
Status: DISCONTINUED | OUTPATIENT
Start: 2019-04-14 | End: 2019-04-18 | Stop reason: HOSPADM

## 2019-04-14 RX ORDER — MORPHINE SULFATE 4 MG/ML
4 INJECTION, SOLUTION INTRAMUSCULAR; INTRAVENOUS ONCE
Status: COMPLETED | OUTPATIENT
Start: 2019-04-14 | End: 2019-04-14

## 2019-04-14 RX ORDER — DIAZEPAM 5 MG/1
5 TABLET ORAL EVERY 8 HOURS PRN
Status: DISCONTINUED | OUTPATIENT
Start: 2019-04-14 | End: 2019-04-18 | Stop reason: HOSPADM

## 2019-04-14 RX ORDER — ASPIRIN 325 MG
325 TABLET ORAL ONCE
Status: COMPLETED | OUTPATIENT
Start: 2019-04-14 | End: 2019-04-14

## 2019-04-14 RX ORDER — NICOTINE 21 MG/24HR
1 PATCH, TRANSDERMAL 24 HOURS TRANSDERMAL DAILY
Status: DISCONTINUED | OUTPATIENT
Start: 2019-04-14 | End: 2019-04-18 | Stop reason: HOSPADM

## 2019-04-14 RX ORDER — ASPIRIN 81 MG/1
81 TABLET, CHEWABLE ORAL DAILY
Status: DISCONTINUED | OUTPATIENT
Start: 2019-04-15 | End: 2019-04-18 | Stop reason: HOSPADM

## 2019-04-14 RX ORDER — TRAMADOL HYDROCHLORIDE 50 MG/1
50 TABLET ORAL EVERY 4 HOURS PRN
Status: DISCONTINUED | OUTPATIENT
Start: 2019-04-14 | End: 2019-04-18 | Stop reason: HOSPADM

## 2019-04-14 RX ORDER — ATORVASTATIN CALCIUM 40 MG/1
40 TABLET, FILM COATED ORAL
Status: DISCONTINUED | OUTPATIENT
Start: 2019-04-14 | End: 2019-04-18 | Stop reason: HOSPADM

## 2019-04-14 RX ADMIN — SODIUM CHLORIDE 125 ML/HR: 0.9 INJECTION, SOLUTION INTRAVENOUS at 19:52

## 2019-04-14 RX ADMIN — ASPIRIN 325 MG ORAL TABLET 325 MG: 325 PILL ORAL at 11:39

## 2019-04-14 RX ADMIN — HEPARIN SODIUM 1000 UNITS: 1000 INJECTION, SOLUTION INTRAVENOUS; SUBCUTANEOUS at 14:39

## 2019-04-14 RX ADMIN — ATORVASTATIN CALCIUM 40 MG: 40 TABLET, FILM COATED ORAL at 16:07

## 2019-04-14 RX ADMIN — MORPHINE SULFATE 4 MG: 4 INJECTION INTRAVENOUS at 10:37

## 2019-04-14 RX ADMIN — SODIUM CHLORIDE: 0.9 INJECTION, SOLUTION INTRAVENOUS at 11:14

## 2019-04-14 RX ADMIN — SODIUM CHLORIDE 10 MG/HR: 0.9 INJECTION, SOLUTION INTRAVENOUS at 10:34

## 2019-04-14 RX ADMIN — DIAZEPAM 5 MG: 5 TABLET ORAL at 22:49

## 2019-04-14 RX ADMIN — HEPARIN SODIUM AND DEXTROSE 1000 UNITS/HR: 10000; 5 INJECTION INTRAVENOUS at 11:40

## 2019-04-14 RX ADMIN — DIAZEPAM 5 MG: 5 TABLET ORAL at 14:57

## 2019-04-14 RX ADMIN — SODIUM CHLORIDE 125 ML/HR: 0.9 INJECTION, SOLUTION INTRAVENOUS at 14:15

## 2019-04-14 RX ADMIN — SODIUM CHLORIDE 1000 ML: 0.9 INJECTION, SOLUTION INTRAVENOUS at 14:18

## 2019-04-14 RX ADMIN — IOHEXOL 100 ML: 350 INJECTION, SOLUTION INTRAVENOUS at 10:00

## 2019-04-14 RX ADMIN — METOPROLOL TARTRATE 10 MG: 5 INJECTION INTRAVENOUS at 10:05

## 2019-04-15 ENCOUNTER — APPOINTMENT (INPATIENT)
Dept: MRI IMAGING | Facility: HOSPITAL | Age: 49
DRG: 065 | End: 2019-04-15
Payer: COMMERCIAL

## 2019-04-15 ENCOUNTER — APPOINTMENT (INPATIENT)
Dept: NON INVASIVE DIAGNOSTICS | Facility: HOSPITAL | Age: 49
DRG: 065 | End: 2019-04-15
Payer: COMMERCIAL

## 2019-04-15 LAB
ALBUMIN SERPL BCP-MCNC: 3.7 G/DL (ref 3.5–5)
ALP SERPL-CCNC: 97 U/L (ref 46–116)
ALT SERPL W P-5'-P-CCNC: 26 U/L (ref 12–78)
ANION GAP SERPL CALCULATED.3IONS-SCNC: 8 MMOL/L (ref 4–13)
APTT PPP: 52 SECONDS (ref 26–38)
APTT PPP: 54 SECONDS (ref 26–38)
APTT PPP: 57 SECONDS (ref 26–38)
APTT PPP: 63 SECONDS (ref 26–38)
AST SERPL W P-5'-P-CCNC: 17 U/L (ref 5–45)
BASOPHILS # BLD AUTO: 0.05 THOUSANDS/ΜL (ref 0–0.1)
BASOPHILS NFR BLD AUTO: 1 % (ref 0–1)
BILIRUB SERPL-MCNC: 0.4 MG/DL (ref 0.2–1)
BUN SERPL-MCNC: 9 MG/DL (ref 5–25)
CALCIUM SERPL-MCNC: 9.1 MG/DL (ref 8.3–10.1)
CHLORIDE SERPL-SCNC: 107 MMOL/L (ref 100–108)
CHOLEST SERPL-MCNC: 161 MG/DL (ref 50–200)
CO2 SERPL-SCNC: 27 MMOL/L (ref 21–32)
CREAT SERPL-MCNC: 0.76 MG/DL (ref 0.6–1.3)
EOSINOPHIL # BLD AUTO: 0.14 THOUSAND/ΜL (ref 0–0.61)
EOSINOPHIL NFR BLD AUTO: 2 % (ref 0–6)
ERYTHROCYTE [DISTWIDTH] IN BLOOD BY AUTOMATED COUNT: 13.4 % (ref 11.6–15.1)
GFR SERPL CREATININE-BSD FRML MDRD: 109 ML/MIN/1.73SQ M
GLUCOSE SERPL-MCNC: 100 MG/DL (ref 65–140)
HCT VFR BLD AUTO: 45.7 % (ref 36.5–49.3)
HDLC SERPL-MCNC: 38 MG/DL (ref 40–60)
HGB BLD-MCNC: 15.5 G/DL (ref 12–17)
IMM GRANULOCYTES # BLD AUTO: 0.03 THOUSAND/UL (ref 0–0.2)
IMM GRANULOCYTES NFR BLD AUTO: 0 % (ref 0–2)
LDLC SERPL CALC-MCNC: 101 MG/DL (ref 0–100)
LYMPHOCYTES # BLD AUTO: 2.16 THOUSANDS/ΜL (ref 0.6–4.47)
LYMPHOCYTES NFR BLD AUTO: 28 % (ref 14–44)
MCH RBC QN AUTO: 30.9 PG (ref 26.8–34.3)
MCHC RBC AUTO-ENTMCNC: 33.9 G/DL (ref 31.4–37.4)
MCV RBC AUTO: 91 FL (ref 82–98)
MONOCYTES # BLD AUTO: 0.56 THOUSAND/ΜL (ref 0.17–1.22)
MONOCYTES NFR BLD AUTO: 7 % (ref 4–12)
NEUTROPHILS # BLD AUTO: 4.76 THOUSANDS/ΜL (ref 1.85–7.62)
NEUTS SEG NFR BLD AUTO: 62 % (ref 43–75)
NRBC BLD AUTO-RTO: 0 /100 WBCS
PLATELET # BLD AUTO: 186 THOUSANDS/UL (ref 149–390)
PMV BLD AUTO: 13.2 FL (ref 8.9–12.7)
POTASSIUM SERPL-SCNC: 3.8 MMOL/L (ref 3.5–5.3)
PROT SERPL-MCNC: 7.1 G/DL (ref 6.4–8.2)
RBC # BLD AUTO: 5.01 MILLION/UL (ref 3.88–5.62)
SODIUM SERPL-SCNC: 142 MMOL/L (ref 136–145)
TRIGL SERPL-MCNC: 109 MG/DL
WBC # BLD AUTO: 7.7 THOUSAND/UL (ref 4.31–10.16)

## 2019-04-15 PROCEDURE — G8987 SELF CARE CURRENT STATUS: HCPCS

## 2019-04-15 PROCEDURE — 85705 THROMBOPLASTIN INHIBITION: CPT | Performed by: PHYSICIAN ASSISTANT

## 2019-04-15 PROCEDURE — G8989 SELF CARE D/C STATUS: HCPCS

## 2019-04-15 PROCEDURE — 86146 BETA-2 GLYCOPROTEIN ANTIBODY: CPT | Performed by: PHYSICIAN ASSISTANT

## 2019-04-15 PROCEDURE — 81240 F2 GENE: CPT | Performed by: PHYSICIAN ASSISTANT

## 2019-04-15 PROCEDURE — 93799 UNLISTED CV SVC/PROCEDURE: CPT

## 2019-04-15 PROCEDURE — 85730 THROMBOPLASTIN TIME PARTIAL: CPT | Performed by: INTERNAL MEDICINE

## 2019-04-15 PROCEDURE — 93306 TTE W/DOPPLER COMPLETE: CPT | Performed by: INTERNAL MEDICINE

## 2019-04-15 PROCEDURE — 97166 OT EVAL MOD COMPLEX 45 MIN: CPT

## 2019-04-15 PROCEDURE — 85670 THROMBIN TIME PLASMA: CPT | Performed by: PHYSICIAN ASSISTANT

## 2019-04-15 PROCEDURE — 85306 CLOT INHIBIT PROT S FREE: CPT | Performed by: PHYSICIAN ASSISTANT

## 2019-04-15 PROCEDURE — G8979 MOBILITY GOAL STATUS: HCPCS

## 2019-04-15 PROCEDURE — 80061 LIPID PANEL: CPT | Performed by: INTERNAL MEDICINE

## 2019-04-15 PROCEDURE — 70551 MRI BRAIN STEM W/O DYE: CPT

## 2019-04-15 PROCEDURE — 85025 COMPLETE CBC W/AUTO DIFF WBC: CPT | Performed by: INTERNAL MEDICINE

## 2019-04-15 PROCEDURE — 86147 CARDIOLIPIN ANTIBODY EA IG: CPT | Performed by: PHYSICIAN ASSISTANT

## 2019-04-15 PROCEDURE — 85300 ANTITHROMBIN III ACTIVITY: CPT | Performed by: PHYSICIAN ASSISTANT

## 2019-04-15 PROCEDURE — 99232 SBSQ HOSP IP/OBS MODERATE 35: CPT | Performed by: INTERNAL MEDICINE

## 2019-04-15 PROCEDURE — 97163 PT EVAL HIGH COMPLEX 45 MIN: CPT

## 2019-04-15 PROCEDURE — 80053 COMPREHEN METABOLIC PANEL: CPT | Performed by: INTERNAL MEDICINE

## 2019-04-15 PROCEDURE — 85732 THROMBOPLASTIN TIME PARTIAL: CPT | Performed by: PHYSICIAN ASSISTANT

## 2019-04-15 PROCEDURE — 85613 RUSSELL VIPER VENOM DILUTED: CPT | Performed by: PHYSICIAN ASSISTANT

## 2019-04-15 PROCEDURE — 93306 TTE W/DOPPLER COMPLETE: CPT

## 2019-04-15 PROCEDURE — 85305 CLOT INHIBIT PROT S TOTAL: CPT | Performed by: PHYSICIAN ASSISTANT

## 2019-04-15 PROCEDURE — 81241 F5 GENE: CPT | Performed by: PHYSICIAN ASSISTANT

## 2019-04-15 PROCEDURE — G8980 MOBILITY D/C STATUS: HCPCS

## 2019-04-15 PROCEDURE — 85303 CLOT INHIBIT PROT C ACTIVITY: CPT | Performed by: PHYSICIAN ASSISTANT

## 2019-04-15 PROCEDURE — 99232 SBSQ HOSP IP/OBS MODERATE 35: CPT | Performed by: PSYCHIATRY & NEUROLOGY

## 2019-04-15 PROCEDURE — G8978 MOBILITY CURRENT STATUS: HCPCS

## 2019-04-15 RX ADMIN — OXYCODONE HYDROCHLORIDE AND ACETAMINOPHEN 1 TABLET: 5; 325 TABLET ORAL at 12:58

## 2019-04-15 RX ADMIN — SODIUM CHLORIDE 125 ML/HR: 0.9 INJECTION, SOLUTION INTRAVENOUS at 03:39

## 2019-04-15 RX ADMIN — HEPARIN SODIUM AND DEXTROSE 1100 UNITS/HR: 10000; 5 INJECTION INTRAVENOUS at 11:47

## 2019-04-15 RX ADMIN — OXYCODONE HYDROCHLORIDE AND ACETAMINOPHEN 1 TABLET: 5; 325 TABLET ORAL at 19:59

## 2019-04-15 RX ADMIN — ZOLPIDEM TARTRATE 10 MG: 5 TABLET, FILM COATED ORAL at 21:16

## 2019-04-15 RX ADMIN — DIAZEPAM 5 MG: 5 TABLET ORAL at 18:20

## 2019-04-15 RX ADMIN — DIAZEPAM 5 MG: 5 TABLET ORAL at 08:52

## 2019-04-15 RX ADMIN — ATORVASTATIN CALCIUM 40 MG: 40 TABLET, FILM COATED ORAL at 15:44

## 2019-04-15 RX ADMIN — SODIUM CHLORIDE 125 ML/HR: 0.9 INJECTION, SOLUTION INTRAVENOUS at 21:49

## 2019-04-15 RX ADMIN — ASPIRIN 81 MG 81 MG: 81 TABLET ORAL at 08:52

## 2019-04-15 RX ADMIN — SODIUM CHLORIDE 125 ML/HR: 0.9 INJECTION, SOLUTION INTRAVENOUS at 11:40

## 2019-04-16 LAB
APTT PPP: 62 SECONDS (ref 26–38)
CARDIOLIPIN IGA SER IA-ACNC: <9 APL U/ML (ref 0–11)
CARDIOLIPIN IGG SER IA-ACNC: <9 GPL U/ML (ref 0–14)
CARDIOLIPIN IGM SER IA-ACNC: <9 MPL U/ML (ref 0–12)
DEPRECATED AT III PPP: 94 % OF NORMAL (ref 92–136)

## 2019-04-16 PROCEDURE — ND001 PR NO DOCUMENTATION: Performed by: INTERNAL MEDICINE

## 2019-04-16 PROCEDURE — 99232 SBSQ HOSP IP/OBS MODERATE 35: CPT | Performed by: INTERNAL MEDICINE

## 2019-04-16 PROCEDURE — 85730 THROMBOPLASTIN TIME PARTIAL: CPT | Performed by: INTERNAL MEDICINE

## 2019-04-16 PROCEDURE — 99232 SBSQ HOSP IP/OBS MODERATE 35: CPT | Performed by: PSYCHIATRY & NEUROLOGY

## 2019-04-16 RX ORDER — LISINOPRIL 10 MG/1
10 TABLET ORAL DAILY
Status: DISCONTINUED | OUTPATIENT
Start: 2019-04-16 | End: 2019-04-18 | Stop reason: HOSPADM

## 2019-04-16 RX ORDER — AMLODIPINE BESYLATE 5 MG/1
10 TABLET ORAL DAILY
Status: DISCONTINUED | OUTPATIENT
Start: 2019-04-16 | End: 2019-04-18 | Stop reason: HOSPADM

## 2019-04-16 RX ADMIN — DIAZEPAM 5 MG: 5 TABLET ORAL at 08:21

## 2019-04-16 RX ADMIN — ASPIRIN 81 MG 81 MG: 81 TABLET ORAL at 08:17

## 2019-04-16 RX ADMIN — HEPARIN SODIUM AND DEXTROSE 1150 UNITS/HR: 10000; 5 INJECTION INTRAVENOUS at 10:09

## 2019-04-16 RX ADMIN — DIAZEPAM 5 MG: 5 TABLET ORAL at 16:55

## 2019-04-16 RX ADMIN — SODIUM CHLORIDE 125 ML/HR: 0.9 INJECTION, SOLUTION INTRAVENOUS at 14:43

## 2019-04-16 RX ADMIN — ZOLPIDEM TARTRATE 10 MG: 5 TABLET, FILM COATED ORAL at 21:34

## 2019-04-16 RX ADMIN — LISINOPRIL 10 MG: 10 TABLET ORAL at 08:18

## 2019-04-16 RX ADMIN — ATORVASTATIN CALCIUM 40 MG: 40 TABLET, FILM COATED ORAL at 16:55

## 2019-04-16 RX ADMIN — OXYCODONE HYDROCHLORIDE AND ACETAMINOPHEN 1 TABLET: 5; 325 TABLET ORAL at 13:59

## 2019-04-16 RX ADMIN — OXYCODONE HYDROCHLORIDE AND ACETAMINOPHEN 1 TABLET: 5; 325 TABLET ORAL at 20:07

## 2019-04-16 RX ADMIN — OXYCODONE HYDROCHLORIDE AND ACETAMINOPHEN 1 TABLET: 5; 325 TABLET ORAL at 07:36

## 2019-04-16 RX ADMIN — AMLODIPINE BESYLATE 10 MG: 5 TABLET ORAL at 08:17

## 2019-04-16 RX ADMIN — SODIUM CHLORIDE 125 ML/HR: 0.9 INJECTION, SOLUTION INTRAVENOUS at 06:21

## 2019-04-17 ENCOUNTER — ANESTHESIA EVENT (INPATIENT)
Dept: NON INVASIVE DIAGNOSTICS | Facility: HOSPITAL | Age: 49
DRG: 065 | End: 2019-04-17
Payer: COMMERCIAL

## 2019-04-17 ENCOUNTER — APPOINTMENT (INPATIENT)
Dept: NON INVASIVE DIAGNOSTICS | Facility: HOSPITAL | Age: 49
DRG: 065 | End: 2019-04-17
Attending: PSYCHIATRY & NEUROLOGY
Payer: COMMERCIAL

## 2019-04-17 LAB
APTT PPP: 59 SECONDS (ref 26–38)
APTT PPP: 65 SECONDS (ref 26–38)
APTT PPP: 71 SECONDS (ref 26–38)
B2 GLYCOPROT1 IGA SER-ACNC: <9 GPI IGA UNITS (ref 0–25)
B2 GLYCOPROT1 IGG SER-ACNC: <9 GPI IGG UNITS (ref 0–20)
B2 GLYCOPROT1 IGM SER-ACNC: <9 GPI IGM UNITS (ref 0–32)
PROT S ACT/NOR PPP: 100 % (ref 57–157)
PROT S PPP-ACNC: 87 % (ref 60–150)

## 2019-04-17 PROCEDURE — NC001 PR NO CHARGE: Performed by: INTERNAL MEDICINE

## 2019-04-17 PROCEDURE — 93312 ECHO TRANSESOPHAGEAL: CPT

## 2019-04-17 PROCEDURE — 99232 SBSQ HOSP IP/OBS MODERATE 35: CPT | Performed by: INTERNAL MEDICINE

## 2019-04-17 PROCEDURE — 85730 THROMBOPLASTIN TIME PARTIAL: CPT | Performed by: INTERNAL MEDICINE

## 2019-04-17 RX ORDER — PROPOFOL 10 MG/ML
INJECTION, EMULSION INTRAVENOUS AS NEEDED
Status: DISCONTINUED | OUTPATIENT
Start: 2019-04-17 | End: 2019-04-18 | Stop reason: SURG

## 2019-04-17 RX ORDER — SODIUM CHLORIDE 9 MG/ML
INJECTION, SOLUTION INTRAVENOUS CONTINUOUS PRN
Status: DISCONTINUED | OUTPATIENT
Start: 2019-04-17 | End: 2019-04-18 | Stop reason: SURG

## 2019-04-17 RX ORDER — MIDAZOLAM HYDROCHLORIDE 1 MG/ML
INJECTION INTRAMUSCULAR; INTRAVENOUS AS NEEDED
Status: DISCONTINUED | OUTPATIENT
Start: 2019-04-17 | End: 2019-04-18 | Stop reason: SURG

## 2019-04-17 RX ADMIN — AMLODIPINE BESYLATE 10 MG: 5 TABLET ORAL at 08:46

## 2019-04-17 RX ADMIN — ZOLPIDEM TARTRATE 10 MG: 5 TABLET, FILM COATED ORAL at 21:52

## 2019-04-17 RX ADMIN — DIAZEPAM 5 MG: 5 TABLET ORAL at 07:35

## 2019-04-17 RX ADMIN — ASPIRIN 81 MG 81 MG: 81 TABLET ORAL at 08:46

## 2019-04-17 RX ADMIN — OXYCODONE HYDROCHLORIDE AND ACETAMINOPHEN 1 TABLET: 5; 325 TABLET ORAL at 11:05

## 2019-04-17 RX ADMIN — ATORVASTATIN CALCIUM 40 MG: 40 TABLET, FILM COATED ORAL at 16:39

## 2019-04-17 RX ADMIN — PROPOFOL 50 MG: 10 INJECTION, EMULSION INTRAVENOUS at 15:13

## 2019-04-17 RX ADMIN — LISINOPRIL 10 MG: 10 TABLET ORAL at 08:46

## 2019-04-17 RX ADMIN — PROPOFOL 30 MG: 10 INJECTION, EMULSION INTRAVENOUS at 15:16

## 2019-04-17 RX ADMIN — MIDAZOLAM 2 MG: 1 INJECTION INTRAMUSCULAR; INTRAVENOUS at 15:07

## 2019-04-17 RX ADMIN — PROPOFOL 50 MG: 10 INJECTION, EMULSION INTRAVENOUS at 15:08

## 2019-04-17 RX ADMIN — SODIUM CHLORIDE: 0.9 INJECTION, SOLUTION INTRAVENOUS at 14:47

## 2019-04-17 RX ADMIN — HEPARIN SODIUM AND DEXTROSE 1200 UNITS/HR: 10000; 5 INJECTION INTRAVENOUS at 07:35

## 2019-04-17 RX ADMIN — PROPOFOL 100 MG: 10 INJECTION, EMULSION INTRAVENOUS at 15:06

## 2019-04-17 RX ADMIN — OXYCODONE HYDROCHLORIDE AND ACETAMINOPHEN 1 TABLET: 5; 325 TABLET ORAL at 18:11

## 2019-04-17 RX ADMIN — PROPOFOL 30 MG: 10 INJECTION, EMULSION INTRAVENOUS at 15:18

## 2019-04-18 VITALS
TEMPERATURE: 98.3 F | WEIGHT: 189.6 LBS | BODY MASS INDEX: 27.14 KG/M2 | DIASTOLIC BLOOD PRESSURE: 86 MMHG | HEIGHT: 70 IN | HEART RATE: 71 BPM | RESPIRATION RATE: 15 BRPM | SYSTOLIC BLOOD PRESSURE: 147 MMHG | OXYGEN SATURATION: 99 %

## 2019-04-18 DIAGNOSIS — G89.29 OTHER CHRONIC PAIN: ICD-10-CM

## 2019-04-18 LAB
ANION GAP SERPL CALCULATED.3IONS-SCNC: 8 MMOL/L (ref 4–13)
APTT PPP: 63 SECONDS (ref 26–38)
BASOPHILS # BLD AUTO: 0.05 THOUSANDS/ΜL (ref 0–0.1)
BASOPHILS NFR BLD AUTO: 1 % (ref 0–1)
BUN SERPL-MCNC: 13 MG/DL (ref 5–25)
CALCIUM SERPL-MCNC: 8.5 MG/DL (ref 8.3–10.1)
CHLORIDE SERPL-SCNC: 109 MMOL/L (ref 100–108)
CO2 SERPL-SCNC: 26 MMOL/L (ref 21–32)
CREAT SERPL-MCNC: 0.72 MG/DL (ref 0.6–1.3)
EOSINOPHIL # BLD AUTO: 0.19 THOUSAND/ΜL (ref 0–0.61)
EOSINOPHIL NFR BLD AUTO: 3 % (ref 0–6)
ERYTHROCYTE [DISTWIDTH] IN BLOOD BY AUTOMATED COUNT: 13.2 % (ref 11.6–15.1)
GFR SERPL CREATININE-BSD FRML MDRD: 112 ML/MIN/1.73SQ M
GLUCOSE SERPL-MCNC: 98 MG/DL (ref 65–140)
HCT VFR BLD AUTO: 42.4 % (ref 36.5–49.3)
HGB BLD-MCNC: 13.9 G/DL (ref 12–17)
IMM GRANULOCYTES # BLD AUTO: 0.03 THOUSAND/UL (ref 0–0.2)
IMM GRANULOCYTES NFR BLD AUTO: 0 % (ref 0–2)
LYMPHOCYTES # BLD AUTO: 2.42 THOUSANDS/ΜL (ref 0.6–4.47)
LYMPHOCYTES NFR BLD AUTO: 33 % (ref 14–44)
MCH RBC QN AUTO: 30.4 PG (ref 26.8–34.3)
MCHC RBC AUTO-ENTMCNC: 32.8 G/DL (ref 31.4–37.4)
MCV RBC AUTO: 93 FL (ref 82–98)
MONOCYTES # BLD AUTO: 0.64 THOUSAND/ΜL (ref 0.17–1.22)
MONOCYTES NFR BLD AUTO: 9 % (ref 4–12)
NEUTROPHILS # BLD AUTO: 4.12 THOUSANDS/ΜL (ref 1.85–7.62)
NEUTS SEG NFR BLD AUTO: 54 % (ref 43–75)
NRBC BLD AUTO-RTO: 0 /100 WBCS
PLATELET # BLD AUTO: 160 THOUSANDS/UL (ref 149–390)
PMV BLD AUTO: 11.4 FL (ref 8.9–12.7)
POTASSIUM SERPL-SCNC: 3.8 MMOL/L (ref 3.5–5.3)
PROT C AG ACT/NOR PPP IA: >150 % OF NORMAL (ref 60–150)
PROT S ACT/NOR PPP: 87 % (ref 63–140)
RBC # BLD AUTO: 4.57 MILLION/UL (ref 3.88–5.62)
SODIUM SERPL-SCNC: 143 MMOL/L (ref 136–145)
WBC # BLD AUTO: 7.45 THOUSAND/UL (ref 4.31–10.16)

## 2019-04-18 PROCEDURE — 85730 THROMBOPLASTIN TIME PARTIAL: CPT | Performed by: PHYSICIAN ASSISTANT

## 2019-04-18 PROCEDURE — 93312 ECHO TRANSESOPHAGEAL: CPT | Performed by: INTERNAL MEDICINE

## 2019-04-18 PROCEDURE — 80048 BASIC METABOLIC PNL TOTAL CA: CPT | Performed by: INTERNAL MEDICINE

## 2019-04-18 PROCEDURE — 99238 HOSP IP/OBS DSCHRG MGMT 30/<: CPT | Performed by: INTERNAL MEDICINE

## 2019-04-18 PROCEDURE — 93325 DOPPLER ECHO COLOR FLOW MAPG: CPT | Performed by: INTERNAL MEDICINE

## 2019-04-18 PROCEDURE — 85025 COMPLETE CBC W/AUTO DIFF WBC: CPT | Performed by: INTERNAL MEDICINE

## 2019-04-18 PROCEDURE — 99232 SBSQ HOSP IP/OBS MODERATE 35: CPT | Performed by: PSYCHIATRY & NEUROLOGY

## 2019-04-18 PROCEDURE — 93320 DOPPLER ECHO COMPLETE: CPT | Performed by: INTERNAL MEDICINE

## 2019-04-18 RX ORDER — LISINOPRIL 10 MG/1
10 TABLET ORAL DAILY
Qty: 90 TABLET | Refills: 0 | Status: SHIPPED | OUTPATIENT
Start: 2019-04-19 | End: 2019-05-13 | Stop reason: SDUPTHER

## 2019-04-18 RX ORDER — ATORVASTATIN CALCIUM 40 MG/1
40 TABLET, FILM COATED ORAL
Qty: 90 TABLET | Refills: 0 | Status: SHIPPED | OUTPATIENT
Start: 2019-04-18 | End: 2021-04-21 | Stop reason: HOSPADM

## 2019-04-18 RX ORDER — NICOTINE 21 MG/24HR
1 PATCH, TRANSDERMAL 24 HOURS TRANSDERMAL DAILY
Qty: 28 PATCH | Refills: 0 | Status: SHIPPED | OUTPATIENT
Start: 2019-04-19 | End: 2019-05-22 | Stop reason: ALTCHOICE

## 2019-04-18 RX ORDER — ASPIRIN 81 MG/1
81 TABLET, CHEWABLE ORAL DAILY
Qty: 100 TABLET | Refills: 0 | Status: SHIPPED | OUTPATIENT
Start: 2019-04-19 | End: 2019-07-18 | Stop reason: SDUPTHER

## 2019-04-18 RX ORDER — AMLODIPINE BESYLATE 10 MG/1
10 TABLET ORAL DAILY
Qty: 30 TABLET | Refills: 0 | Status: SHIPPED | OUTPATIENT
Start: 2019-04-19 | End: 2019-05-19 | Stop reason: SDUPTHER

## 2019-04-18 RX ADMIN — LISINOPRIL 10 MG: 10 TABLET ORAL at 08:31

## 2019-04-18 RX ADMIN — OXYCODONE HYDROCHLORIDE AND ACETAMINOPHEN 1 TABLET: 5; 325 TABLET ORAL at 08:29

## 2019-04-18 RX ADMIN — ASPIRIN 81 MG 81 MG: 81 TABLET ORAL at 08:31

## 2019-04-18 RX ADMIN — AMLODIPINE BESYLATE 10 MG: 5 TABLET ORAL at 08:34

## 2019-04-18 RX ADMIN — HEPARIN SODIUM AND DEXTROSE 1200 UNITS/HR: 10000; 5 INJECTION INTRAVENOUS at 05:26

## 2019-04-19 ENCOUNTER — TRANSITIONAL CARE MANAGEMENT (OUTPATIENT)
Dept: FAMILY MEDICINE CLINIC | Facility: CLINIC | Age: 49
End: 2019-04-19

## 2019-04-19 ENCOUNTER — TELEPHONE (OUTPATIENT)
Dept: NEUROLOGY | Facility: CLINIC | Age: 49
End: 2019-04-19

## 2019-04-19 DIAGNOSIS — G89.29 OTHER CHRONIC PAIN: ICD-10-CM

## 2019-04-19 LAB — F5 GENE MUT ANL BLD/T: NORMAL

## 2019-04-19 RX ORDER — OXYCODONE HYDROCHLORIDE AND ACETAMINOPHEN 5; 325 MG/1; MG/1
TABLET ORAL
Qty: 120 TABLET | Refills: 0 | Status: CANCELLED | OUTPATIENT
Start: 2019-04-19

## 2019-04-19 RX ORDER — OXYCODONE HYDROCHLORIDE AND ACETAMINOPHEN 5; 325 MG/1; MG/1
TABLET ORAL
Qty: 120 TABLET | Refills: 0 | Status: SHIPPED | OUTPATIENT
Start: 2019-04-19 | End: 2019-05-20 | Stop reason: SDUPTHER

## 2019-04-22 LAB — F2 GENE MUT ANL BLD/T: NORMAL

## 2019-04-23 ENCOUNTER — OFFICE VISIT (OUTPATIENT)
Dept: FAMILY MEDICINE CLINIC | Facility: CLINIC | Age: 49
End: 2019-04-23
Payer: COMMERCIAL

## 2019-04-23 DIAGNOSIS — I63.9 CEREBROVASCULAR ACCIDENT (CVA), UNSPECIFIED MECHANISM (HCC): Primary | ICD-10-CM

## 2019-04-23 DIAGNOSIS — S96.912S STRAIN OF LEFT FOOT, SEQUELA: ICD-10-CM

## 2019-04-23 DIAGNOSIS — Z72.0 TOBACCO ABUSE: ICD-10-CM

## 2019-04-23 PROCEDURE — 99495 TRANSJ CARE MGMT MOD F2F 14D: CPT | Performed by: FAMILY MEDICINE

## 2019-04-23 PROCEDURE — 1111F DSCHRG MED/CURRENT MED MERGE: CPT | Performed by: FAMILY MEDICINE

## 2019-04-23 RX ORDER — DIAZEPAM 10 MG/1
10 TABLET ORAL EVERY 8 HOURS PRN
Qty: 90 TABLET | Refills: 5 | Status: SHIPPED | OUTPATIENT
Start: 2019-04-23 | End: 2019-10-14 | Stop reason: SDUPTHER

## 2019-04-25 LAB
APTT SCREEN TO CONFIRM RATIO: 1.02 RATIO (ref 0–1.4)
CONFIRM APTT/NORMAL: 39.2 SEC (ref 0–55)
LA PPP-IMP: ABNORMAL
SCREEN APTT: 36.4 SEC (ref 0–51.9)
SCREEN DRVVT: 33.9 SEC (ref 0–47)
THROMBIN TIME: 42.2 SEC (ref 0–23)
TT IMM NP PPP: 25.5 SEC (ref 0–23)
TT P HPASE PPP: 19.6 SEC (ref 0–23)

## 2019-05-13 ENCOUNTER — TELEPHONE (OUTPATIENT)
Dept: FAMILY MEDICINE CLINIC | Facility: CLINIC | Age: 49
End: 2019-05-13

## 2019-05-13 DIAGNOSIS — I16.1 HYPERTENSIVE EMERGENCY: ICD-10-CM

## 2019-05-13 RX ORDER — LISINOPRIL 10 MG/1
10 TABLET ORAL DAILY
Qty: 90 TABLET | Refills: 0 | Status: SHIPPED | OUTPATIENT
Start: 2019-05-13 | End: 2019-07-31 | Stop reason: SDUPTHER

## 2019-05-17 ENCOUNTER — OFFICE VISIT (OUTPATIENT)
Dept: NEUROLOGY | Facility: CLINIC | Age: 49
End: 2019-05-17
Payer: COMMERCIAL

## 2019-05-17 VITALS
HEIGHT: 70 IN | BODY MASS INDEX: 27.77 KG/M2 | DIASTOLIC BLOOD PRESSURE: 84 MMHG | HEART RATE: 78 BPM | SYSTOLIC BLOOD PRESSURE: 122 MMHG | WEIGHT: 194 LBS

## 2019-05-17 DIAGNOSIS — Q21.1 PFO (PATENT FORAMEN OVALE): ICD-10-CM

## 2019-05-17 DIAGNOSIS — Z72.0 TOBACCO ABUSE: ICD-10-CM

## 2019-05-17 DIAGNOSIS — I10 ESSENTIAL HYPERTENSION: ICD-10-CM

## 2019-05-17 DIAGNOSIS — Z86.73 HISTORY OF CVA (CEREBROVASCULAR ACCIDENT): Primary | ICD-10-CM

## 2019-05-17 PROCEDURE — 99214 OFFICE O/P EST MOD 30 MIN: CPT | Performed by: PHYSICIAN ASSISTANT

## 2019-05-19 DIAGNOSIS — I16.1 HYPERTENSIVE EMERGENCY: ICD-10-CM

## 2019-05-20 DIAGNOSIS — G89.29 OTHER CHRONIC PAIN: ICD-10-CM

## 2019-05-20 PROBLEM — Z86.73 HISTORY OF CVA (CEREBROVASCULAR ACCIDENT): Status: ACTIVE | Noted: 2019-04-14

## 2019-05-20 PROBLEM — Q21.12 PFO (PATENT FORAMEN OVALE): Status: ACTIVE | Noted: 2019-05-20

## 2019-05-20 PROBLEM — Q21.1 PFO (PATENT FORAMEN OVALE): Status: ACTIVE | Noted: 2019-05-20

## 2019-05-20 RX ORDER — AMLODIPINE BESYLATE 10 MG/1
TABLET ORAL
Qty: 30 TABLET | Refills: 0 | Status: SHIPPED | OUTPATIENT
Start: 2019-05-20 | End: 2019-05-23 | Stop reason: SDUPTHER

## 2019-05-20 RX ORDER — OXYCODONE HYDROCHLORIDE AND ACETAMINOPHEN 5; 325 MG/1; MG/1
TABLET ORAL
Qty: 120 TABLET | Refills: 0 | Status: SHIPPED | OUTPATIENT
Start: 2019-05-20 | End: 2019-06-17 | Stop reason: SDUPTHER

## 2019-05-22 ENCOUNTER — CONSULT (OUTPATIENT)
Dept: CARDIOLOGY CLINIC | Facility: CLINIC | Age: 49
End: 2019-05-22
Payer: COMMERCIAL

## 2019-05-22 VITALS
WEIGHT: 195 LBS | HEIGHT: 71 IN | DIASTOLIC BLOOD PRESSURE: 82 MMHG | HEART RATE: 72 BPM | SYSTOLIC BLOOD PRESSURE: 126 MMHG | BODY MASS INDEX: 27.3 KG/M2

## 2019-05-22 DIAGNOSIS — Z72.0 TOBACCO ABUSE: ICD-10-CM

## 2019-05-22 DIAGNOSIS — I10 ESSENTIAL (PRIMARY) HYPERTENSION: Primary | ICD-10-CM

## 2019-05-22 PROCEDURE — 99214 OFFICE O/P EST MOD 30 MIN: CPT | Performed by: INTERNAL MEDICINE

## 2019-05-23 ENCOUNTER — OFFICE VISIT (OUTPATIENT)
Dept: FAMILY MEDICINE CLINIC | Facility: CLINIC | Age: 49
End: 2019-05-23
Payer: COMMERCIAL

## 2019-05-23 DIAGNOSIS — I10 ESSENTIAL HYPERTENSION: Primary | ICD-10-CM

## 2019-05-23 DIAGNOSIS — Q21.1 PFO (PATENT FORAMEN OVALE): ICD-10-CM

## 2019-05-23 DIAGNOSIS — G89.29 OTHER CHRONIC PAIN: ICD-10-CM

## 2019-05-23 DIAGNOSIS — I63.9 CEREBROVASCULAR ACCIDENT (CVA), UNSPECIFIED MECHANISM (HCC): ICD-10-CM

## 2019-05-23 DIAGNOSIS — I16.1 HYPERTENSIVE EMERGENCY: ICD-10-CM

## 2019-05-23 DIAGNOSIS — E78.01 FAMILIAL HYPERCHOLESTEROLEMIA: ICD-10-CM

## 2019-05-23 PROCEDURE — 99214 OFFICE O/P EST MOD 30 MIN: CPT | Performed by: FAMILY MEDICINE

## 2019-05-23 RX ORDER — AMLODIPINE BESYLATE 10 MG/1
10 TABLET ORAL DAILY
Qty: 90 TABLET | Refills: 3 | Status: SHIPPED | OUTPATIENT
Start: 2019-05-23 | End: 2020-04-30

## 2019-06-17 DIAGNOSIS — G89.29 OTHER CHRONIC PAIN: ICD-10-CM

## 2019-06-17 RX ORDER — OXYCODONE HYDROCHLORIDE AND ACETAMINOPHEN 5; 325 MG/1; MG/1
TABLET ORAL
Qty: 120 TABLET | Refills: 0 | Status: SHIPPED | OUTPATIENT
Start: 2019-06-17 | End: 2019-06-28

## 2019-06-25 ENCOUNTER — TELEPHONE (OUTPATIENT)
Dept: NEUROLOGY | Facility: CLINIC | Age: 49
End: 2019-06-25

## 2019-06-26 ENCOUNTER — TELEPHONE (OUTPATIENT)
Dept: OTHER | Facility: OTHER | Age: 49
End: 2019-06-26

## 2019-06-28 ENCOUNTER — OFFICE VISIT (OUTPATIENT)
Dept: FAMILY MEDICINE CLINIC | Facility: CLINIC | Age: 49
End: 2019-06-28
Payer: COMMERCIAL

## 2019-06-28 DIAGNOSIS — I63.30 CEREBROVASCULAR ACCIDENT (CVA) DUE TO THROMBOSIS OF CEREBRAL ARTERY (HCC): Primary | ICD-10-CM

## 2019-06-28 DIAGNOSIS — G89.29 OTHER CHRONIC PAIN: ICD-10-CM

## 2019-06-28 PROCEDURE — 99214 OFFICE O/P EST MOD 30 MIN: CPT | Performed by: FAMILY MEDICINE

## 2019-06-28 RX ORDER — HYDROCODONE BITARTRATE AND ACETAMINOPHEN 5; 325 MG/1; MG/1
1 TABLET ORAL EVERY 6 HOURS PRN
Qty: 120 TABLET | Refills: 0 | Status: SHIPPED | OUTPATIENT
Start: 2019-06-28 | End: 2019-07-24 | Stop reason: SDUPTHER

## 2019-07-08 DIAGNOSIS — S96.912S STRAIN OF LEFT FOOT, SEQUELA: ICD-10-CM

## 2019-07-08 RX ORDER — TRAMADOL HYDROCHLORIDE 50 MG/1
TABLET ORAL
Qty: 120 TABLET | Refills: 5 | Status: SHIPPED | OUTPATIENT
Start: 2019-07-08 | End: 2019-12-19 | Stop reason: SDUPTHER

## 2019-07-18 DIAGNOSIS — I63.9 STROKE (CEREBRUM) (HCC): ICD-10-CM

## 2019-07-19 RX ORDER — ACETAMINOPHEN 160 MG
TABLET,CHEWABLE ORAL
Qty: 100 TABLET | Refills: 0 | Status: SHIPPED | OUTPATIENT
Start: 2019-07-19 | End: 2019-10-22 | Stop reason: SDUPTHER

## 2019-07-21 DIAGNOSIS — G47.00 INSOMNIA, UNSPECIFIED TYPE: ICD-10-CM

## 2019-07-22 RX ORDER — ZOLPIDEM TARTRATE 10 MG/1
TABLET ORAL
Qty: 30 TABLET | Refills: 5 | Status: SHIPPED | OUTPATIENT
Start: 2019-07-22 | End: 2020-01-14

## 2019-07-24 DIAGNOSIS — G89.29 OTHER CHRONIC PAIN: ICD-10-CM

## 2019-07-24 RX ORDER — HYDROCODONE BITARTRATE AND ACETAMINOPHEN 5; 325 MG/1; MG/1
1 TABLET ORAL EVERY 6 HOURS PRN
Qty: 120 TABLET | Refills: 0 | Status: SHIPPED | OUTPATIENT
Start: 2019-07-27 | End: 2019-08-21

## 2019-07-29 ENCOUNTER — OFFICE VISIT (OUTPATIENT)
Dept: FAMILY MEDICINE CLINIC | Facility: CLINIC | Age: 49
End: 2019-07-29
Payer: COMMERCIAL

## 2019-07-29 VITALS
WEIGHT: 190 LBS | HEIGHT: 71 IN | DIASTOLIC BLOOD PRESSURE: 72 MMHG | BODY MASS INDEX: 26.6 KG/M2 | OXYGEN SATURATION: 98 % | HEART RATE: 68 BPM | SYSTOLIC BLOOD PRESSURE: 124 MMHG

## 2019-07-29 DIAGNOSIS — F32.1 CURRENT MODERATE EPISODE OF MAJOR DEPRESSIVE DISORDER WITHOUT PRIOR EPISODE (HCC): Primary | ICD-10-CM

## 2019-07-29 PROCEDURE — 99213 OFFICE O/P EST LOW 20 MIN: CPT | Performed by: FAMILY MEDICINE

## 2019-07-29 RX ORDER — FLUOXETINE 20 MG/1
20 TABLET, FILM COATED ORAL DAILY
Qty: 90 TABLET | Refills: 3 | Status: SHIPPED | OUTPATIENT
Start: 2019-07-29 | End: 2020-05-11

## 2019-07-29 NOTE — PROGRESS NOTES
[facility]        NAME: Misty Lazaro is a 54 y o  male  : 1970    MRN: [id number]  DATE: 2019  TIME: 3:27 PM    Assessment and Plan   Current moderate episode of major depressive disorder without prior episode ([facility] [address] ) [F32 1]  1  Current moderate episode of major depressive disorder without prior episode ([facility])  FLUoxetine (PROzac) 20 MG tablet         Patient Instructions     Patient Instructions   Trial prozac---if no response trial zoloft---consider wellbutrin but seizure risk is remote consideration---refer           Chief Complaint     Chief Complaint   Patient presents with    Follow-up     1mos--CVA         History of Present Illness       S/p CVA sev mos ago--now short tempered/depression      Review of Systems   Review of Systems   Constitutional: Negative for fatigue, fever and unexpected weight change  HENT: Negative for congestion, sinus pressure and sore throat  Eyes: Negative for visual disturbance  Respiratory: Negative for shortness of breath and wheezing  Cardiovascular: Negative for chest pain and palpitations  Gastrointestinal: Negative for abdominal pain, diarrhea, nausea and vomiting  Psychiatric/Behavioral: Positive for dysphoric mood           Current Medications       Current Outpatient Medications:     amLODIPine (NORVASC) 10 mg tablet, Take 1 tablet (10 mg total) by mouth daily, Disp: 90 tablet, Rfl: 3    atorvastatin (LIPITOR) 40 mg tablet, Take 1 tablet (40 mg total) by mouth daily with dinner, Disp: 90 tablet, Rfl: 0    diazepam (VALIUM) 10 mg tablet, Take 1 tablet (10 mg total) by mouth every 8 (eight) hours as needed for anxiety, Disp: 90 tablet, Rfl: 5    FLUoxetine (PROzac) 20 MG tablet, Take 1 tablet (20 mg total) by mouth daily, Disp: 90 tablet, Rfl: 3    HYDROcodone-acetaminophen (NORCO) 5-325 mg per tablet, Take 1 tablet by mouth every 6 (six) hours as needed for painMax Daily Amount: 4 tablets, Disp: 120 tablet, Rfl: 0    lisinopril (ZESTRIL) 10 mg tablet, Take 1 tablet (10 mg total) by mouth daily, Disp: 90 tablet, Rfl: 0    RA ASPIRIN ADULT LOW STRENGTH 81 MG chewable tablet, chew and swallow 1 tablet by mouth once daily, Disp: 100 tablet, Rfl: 0    traMADol (ULTRAM) 50 mg tablet, take 1 tablet by mouth every 4 hours if needed, Disp: 120 tablet, Rfl: 5    zolpidem (AMBIEN) 10 mg tablet, take 1 tablet by mouth at bedtime for sleep, Disp: 30 tablet, Rfl: 5    Current Allergies     Allergies as of 07/29/2019    (No Known Allergies)            The following portions of the patient's history were reviewed and updated as appropriate: allergies, current medications, past family history, past medical history, past social history, past surgical history and problem list      Past Medical History:   Diagnosis Date    Anxiety     Chronic pain     Headache, tension-type     Hyperlipidemia     Hypertension     Stroke [facility])        Past Surgical History:   Procedure Laterality Date    HERNIA REPAIR         Family History   Problem Relation Age of Onset    Hypertension Mother     Multiple sclerosis Mother     Stroke Mother     Hypertension Father     Atrial fibrillation Father     Diabetes Family     Stroke Family     Substance Abuse Neg Hx         family history    Mental illness Neg Hx         family history         Medications have been verified  Objective   /72   Pulse 68   Ht 5' 10 5" (1 791 m)   Wt 86 2 kg (190 lb)   SpO2 98%   BMI 26 88 kg/m²        Physical Exam     Physical Exam   Constitutional: He appears well-developed and well-nourished  No distress  HENT:   Right Ear: Tympanic membrane, external ear and ear canal normal  Tympanic membrane is not injected  Left Ear: Tympanic membrane, external ear and ear canal normal  Tympanic membrane is not injected     Nose: Nose normal    Mouth/Throat: Uvula is midline, oropharynx is clear and moist and mucous membranes are normal  Eyes: Pupils are equal, round, and reactive to light  Conjunctivae are normal    Neck: Normal range of motion  Neck supple  No thyromegaly present  Cardiovascular: Normal rate, regular rhythm and normal heart sounds  No murmur heard  Pulmonary/Chest: Effort normal and breath sounds normal  No respiratory distress  He has no wheezes  Lymphadenopathy:     He has no cervical adenopathy  Skin: He is not diaphoretic

## 2019-07-29 NOTE — PATIENT INSTRUCTIONS
Trial prozac---if no response trial zoloft---consider wellbutrin but seizure risk is remote consideration---refer

## 2019-07-31 DIAGNOSIS — I16.1 HYPERTENSIVE EMERGENCY: ICD-10-CM

## 2019-07-31 RX ORDER — LISINOPRIL 10 MG/1
TABLET ORAL
Qty: 90 TABLET | Refills: 0 | Status: SHIPPED | OUTPATIENT
Start: 2019-08-05 | End: 2019-10-24 | Stop reason: SDUPTHER

## 2019-08-21 ENCOUNTER — OFFICE VISIT (OUTPATIENT)
Dept: FAMILY MEDICINE CLINIC | Facility: CLINIC | Age: 49
End: 2019-08-21
Payer: COMMERCIAL

## 2019-08-21 VITALS
SYSTOLIC BLOOD PRESSURE: 128 MMHG | TEMPERATURE: 97.3 F | HEART RATE: 78 BPM | OXYGEN SATURATION: 98 % | HEIGHT: 71 IN | DIASTOLIC BLOOD PRESSURE: 84 MMHG | BODY MASS INDEX: 25.76 KG/M2 | WEIGHT: 184 LBS

## 2019-08-21 DIAGNOSIS — I10 ESSENTIAL HYPERTENSION: ICD-10-CM

## 2019-08-21 DIAGNOSIS — Z86.73 HISTORY OF CVA (CEREBROVASCULAR ACCIDENT): Primary | ICD-10-CM

## 2019-08-21 DIAGNOSIS — I63.9 CEREBROVASCULAR ACCIDENT (CVA), UNSPECIFIED MECHANISM (HCC): ICD-10-CM

## 2019-08-21 DIAGNOSIS — G89.29 OTHER CHRONIC PAIN: ICD-10-CM

## 2019-08-21 PROCEDURE — 3074F SYST BP LT 130 MM HG: CPT | Performed by: FAMILY MEDICINE

## 2019-08-21 PROCEDURE — 3008F BODY MASS INDEX DOCD: CPT | Performed by: FAMILY MEDICINE

## 2019-08-21 PROCEDURE — 99214 OFFICE O/P EST MOD 30 MIN: CPT | Performed by: FAMILY MEDICINE

## 2019-08-21 RX ORDER — OXYCODONE HYDROCHLORIDE AND ACETAMINOPHEN 5; 325 MG/1; MG/1
1 TABLET ORAL EVERY 4 HOURS PRN
Qty: 120 TABLET | Refills: 0 | Status: SHIPPED | OUTPATIENT
Start: 2019-08-21 | End: 2019-09-18 | Stop reason: SDUPTHER

## 2019-08-21 NOTE — PROGRESS NOTES
[facility]        NAME: Marlene Corbett is a 54 y o  male  : 1970    MRN: [id number]  DATE: 2019  TIME: 10:25 AM    Assessment and Plan   History of CVA (cerebrovascular accident) [Z86 73]  1  History of CVA (cerebrovascular accident)     2  Other chronic pain     3  Cerebrovascular accident (CVA), unspecified mechanism ([address] )     4  Essential hypertension           Patient Instructions     Patient Instructions   F/u neuro Sept          Chief Complaint     Chief Complaint   Patient presents with    Eye Problem     left eye     Headache    Leg Pain     right side loss of strength          History of Present Illness       F/u headache s/p CVA---describes episodes where face gets hot/R leg shaky      Review of Systems   Review of Systems   Constitutional: Negative for fatigue, fever and unexpected weight change  HENT: Negative for congestion, sinus pain and sore throat  Eyes: Negative for visual disturbance  Respiratory: Negative for shortness of breath and wheezing  Cardiovascular: Negative for chest pain and palpitations  Gastrointestinal: Negative for abdominal pain, nausea and vomiting  Musculoskeletal: Negative  Negative for arthralgias and myalgias  Neurological: Positive for weakness and headaches  Negative for syncope and numbness  Psychiatric/Behavioral: Negative  Negative for confusion, dysphoric mood and suicidal ideas           Current Medications       Current Outpatient Medications:     amLODIPine (NORVASC) 10 mg tablet, Take 1 tablet (10 mg total) by mouth daily, Disp: 90 tablet, Rfl: 3    atorvastatin (LIPITOR) 40 mg tablet, Take 1 tablet (40 mg total) by mouth daily with dinner, Disp: 90 tablet, Rfl: 0    diazepam (VALIUM) 10 mg tablet, Take 1 tablet (10 mg total) by mouth every 8 (eight) hours as needed for anxiety, Disp: 90 tablet, Rfl: 5    FLUoxetine (PROzac) 20 MG tablet, Take 1 tablet (20 mg total) by mouth daily, Disp: 90 tablet, Rfl: 3    lisinopril (ZESTRIL) 10 mg tablet, take 1 tablet by mouth once daily, Disp: 90 tablet, Rfl: 0    RA ASPIRIN ADULT LOW STRENGTH 81 MG chewable tablet, chew and swallow 1 tablet by mouth once daily, Disp: 100 tablet, Rfl: 0    traMADol (ULTRAM) 50 mg tablet, take 1 tablet by mouth every 4 hours if needed, Disp: 120 tablet, Rfl: 5    zolpidem (AMBIEN) 10 mg tablet, take 1 tablet by mouth at bedtime for sleep, Disp: 30 tablet, Rfl: 5    Current Allergies     Allergies as of 08/21/2019    (No Known Allergies)            The following portions of the patient's history were reviewed and updated as appropriate: allergies, current medications, past family history, past medical history, past social history, past surgical history and problem list      Past Medical History:   Diagnosis Date    Anxiety     Chronic pain     Headache, tension-type     Hyperlipidemia     Hypertension     Stroke [facility])        Past Surgical History:   Procedure Laterality Date    HERNIA REPAIR         Family History   Problem Relation Age of Onset    Hypertension Mother     Multiple sclerosis Mother     Stroke Mother     Hypertension Father     Atrial fibrillation Father     Diabetes Family     Stroke Family     Substance Abuse Neg Hx         family history    Mental illness Neg Hx         family history         Medications have been verified  Objective   /84   Pulse 78   Temp (!) 97 3 °F (36 3 °C)   Ht 5' 10 5" (1 791 m)   Wt 83 5 kg (184 lb)   SpO2 98%   BMI 26 03 kg/m²        Physical Exam     Physical Exam   Constitutional: He is oriented to person, place, and time  Vital signs are normal  He appears well-developed and well-nourished  HENT:   Right Ear: Ear canal normal  Tympanic membrane is not injected  Left Ear: Ear canal normal  Tympanic membrane is not injected     Nose: Nose normal    Mouth/Throat: Oropharynx is clear and moist    Eyes: Pupils are equal, round, and reactive to light  Conjunctivae and EOM are normal  Right eye exhibits no discharge  Left eye exhibits no discharge  Neck: Normal range of motion  Neck supple  No thyromegaly present  Cardiovascular: Normal rate, regular rhythm and normal heart sounds  No murmur heard  Pulmonary/Chest: Effort normal and breath sounds normal  No respiratory distress  He has no wheezes  Abdominal: Soft  Bowel sounds are normal  He exhibits no distension  There is no tenderness  Musculoskeletal: Normal range of motion  Lymphadenopathy:     He has no cervical adenopathy  Neurological: He is alert and oriented to person, place, and time  He has normal strength and normal reflexes  He is not disoriented  No sensory deficit  Gait normal    Skin: Skin is warm and dry  Psychiatric: He has a normal mood and affect  His speech is normal and behavior is normal  Judgment and thought content normal  Cognition and memory are normal        BMI Counseling: Body mass index is 26 03 kg/m²  Discussed the patient's BMI with him  The BMI is above average  BMI counseling and education was provided to the patient  Nutrition recommendations include reducing portion sizes    Of course none of this makes any clinical sense as pts wt is perfect

## 2019-09-18 DIAGNOSIS — G89.29 OTHER CHRONIC PAIN: ICD-10-CM

## 2019-09-18 RX ORDER — OXYCODONE HYDROCHLORIDE AND ACETAMINOPHEN 5; 325 MG/1; MG/1
1 TABLET ORAL EVERY 4 HOURS PRN
Qty: 120 TABLET | Refills: 0 | Status: SHIPPED | OUTPATIENT
Start: 2019-09-18 | End: 2019-10-21 | Stop reason: SDUPTHER

## 2019-10-10 DIAGNOSIS — S96.912S STRAIN OF LEFT FOOT, SEQUELA: ICD-10-CM

## 2019-10-11 RX ORDER — DIAZEPAM 10 MG/1
TABLET ORAL
Qty: 90 TABLET | Refills: 5 | OUTPATIENT
Start: 2019-10-11

## 2019-10-14 DIAGNOSIS — S96.912S STRAIN OF LEFT FOOT, SEQUELA: ICD-10-CM

## 2019-10-14 RX ORDER — DIAZEPAM 10 MG/1
TABLET ORAL
Qty: 90 TABLET | Refills: 5 | Status: SHIPPED | OUTPATIENT
Start: 2019-10-14 | End: 2020-04-01

## 2019-10-21 DIAGNOSIS — G89.29 OTHER CHRONIC PAIN: ICD-10-CM

## 2019-10-22 DIAGNOSIS — I63.9 STROKE (CEREBRUM) (HCC): ICD-10-CM

## 2019-10-22 RX ORDER — ASPIRIN 81 MG/1
TABLET, CHEWABLE ORAL
Qty: 100 TABLET | Refills: 0 | Status: SHIPPED | OUTPATIENT
Start: 2019-10-22 | End: 2020-01-22

## 2019-10-22 RX ORDER — OXYCODONE HYDROCHLORIDE AND ACETAMINOPHEN 5; 325 MG/1; MG/1
1 TABLET ORAL EVERY 4 HOURS PRN
Qty: 120 TABLET | Refills: 0 | Status: SHIPPED | OUTPATIENT
Start: 2019-10-22 | End: 2019-11-18 | Stop reason: SDUPTHER

## 2019-10-24 DIAGNOSIS — I16.1 HYPERTENSIVE EMERGENCY: ICD-10-CM

## 2019-10-24 RX ORDER — LISINOPRIL 10 MG/1
TABLET ORAL
Qty: 90 TABLET | Refills: 0 | Status: SHIPPED | OUTPATIENT
Start: 2019-10-24 | End: 2020-01-22

## 2019-11-01 NOTE — PROGRESS NOTES
Cardiology Follow Up    Ava Perdomo  1970  [id number]  [facility]  [address]  [address]  [phone]  [phone]    1  Essential (primary) hypertension     2  Tobacco abuse     3  Pure hypercholesterolemia         Interval History:  Patient is here for a follow-up visit  He was last seen by me in May of this year  He has prior history of stroke with a small patent foramen ovale noted which was felt to be incidental and not the etiology of the prior stroke  He is currently managed with aspirin, statin and blood pressure control  He has occasional issues with anxiety but in general is feeling well  He has had no chest pain or significant dyspnea      Patient Active Problem List   Diagnosis    Chronic pain    History of CVA (cerebrovascular accident)    Hypertension    Tobacco abuse    PFO (patent foramen ovale)    Stroke [facility])     Past Medical History:   Diagnosis Date    Anxiety     Chronic pain     Headache, tension-type     Hyperlipidemia     Hypertension     Stroke [facility])      Social History     Socioeconomic History    Marital status: /Civil Union     Spouse name: Not on file    Number of children: Not on file    Years of education: Not on file    Highest education level: Not on file   Occupational History    Not on file   Social Needs    Financial resource strain: Not on file    Food insecurity:     Worry: Not on file     Inability: Not on file    Transportation needs:     Medical: Not on file     Non-medical: Not on file   Tobacco Use    Smoking status: Current Every Day Smoker     Packs/day: 1 00    Smokeless tobacco: Never Used   Substance and Sexual Activity    Alcohol use: No    Drug use: No    Sexual activity: Not on file   Lifestyle    Physical activity:     Days per week: Not on file     Minutes per session: Not on file    Stress: Not on file Relationships    Social connections:     Talks on phone: Not on file     Gets together: Not on file     Attends Muslim service: Not on file     Active member of club or organization: Not on file     Attends meetings of clubs or organizations: Not on file     Relationship status: Not on file    Intimate partner violence:     Fear of current or ex partner: Not on file     Emotionally abused: Not on file     Physically abused: Not on file     Forced sexual activity: Not on file   Other Topics Concern    Not on file   Social History Narrative    Not on file      Family History   Problem Relation Age of Onset    Hypertension Mother     Multiple sclerosis Mother     Stroke Mother     Hypertension Father     Atrial fibrillation Father     Diabetes Family     Stroke Family     Substance Abuse Neg Hx         family history    Mental illness Neg Hx         family history     Past Surgical History:   Procedure Laterality Date    HERNIA REPAIR         Current Outpatient Medications:     amLODIPine (NORVASC) 10 mg tablet, Take 1 tablet (10 mg total) by mouth daily, Disp: 90 tablet, Rfl: 3    aspirin 81 mg chewable tablet, chew and swallow 1 tablet by mouth once daily, Disp: 100 tablet, Rfl: 0    atorvastatin (LIPITOR) 40 mg tablet, Take 1 tablet (40 mg total) by mouth daily with dinner, Disp: 90 tablet, Rfl: 0    diazepam (VALIUM) 10 mg tablet, take 1 tablet by mouth every 8 hours if needed for anxiety, Disp: 90 tablet, Rfl: 5    lisinopril (ZESTRIL) 10 mg tablet, take 1 tablet by mouth once daily, Disp: 90 tablet, Rfl: 0    oxyCODONE-acetaminophen (PERCOCET) 5-325 mg per tablet, Take 1 tablet by mouth every 4 (four) hours as needed for moderate painMax Daily Amount: 6 tablets, Disp: 120 tablet, Rfl: 0    traMADol (ULTRAM) 50 mg tablet, take 1 tablet by mouth every 4 hours if needed, Disp: 120 tablet, Rfl: 5    zolpidem (AMBIEN) 10 mg tablet, take 1 tablet by mouth at bedtime for sleep, Disp: 30 tablet, Rfl: 5    FLUoxetine (PROzac) 20 MG tablet, Take 1 tablet (20 mg total) by mouth daily (Patient not taking: Reported on 11/6/2019), Disp: 90 tablet, Rfl: 3  No Known Allergies    Labs:not applicable  Imaging: No results found  Review of Systems:  Review of Systems   All other systems reviewed and are negative  Physical Exam:  /78 (BP Location: Left arm, Patient Position: Sitting, Cuff Size: Standard)   Pulse 72   Ht 5' 10 5" (1 791 m)   Wt 84 4 kg (186 lb)   BMI 26 31 kg/m²   Physical Exam   Constitutional: He is oriented to person, place, and time  He appears well-developed and well-nourished  HENT:   Head: Normocephalic and atraumatic  Eyes: Pupils are equal, round, and reactive to light  Conjunctivae are normal    Neck: Normal range of motion  Neck supple  Cardiovascular: Normal rate and normal heart sounds  Pulmonary/Chest: Effort normal and breath sounds normal    Neurological: He is alert and oriented to person, place, and time  Skin: Skin is warm and dry  Psychiatric: He has a normal mood and affect  Vitals reviewed  Discussion/Summary:I will continue the patient's present medical regimen  The patient appears well compensated  I have asked the patient to call if there is a problem in the interim otherwise I will see the patient in six months time

## 2019-11-06 ENCOUNTER — OFFICE VISIT (OUTPATIENT)
Dept: CARDIOLOGY CLINIC | Facility: CLINIC | Age: 49
End: 2019-11-06
Payer: COMMERCIAL

## 2019-11-06 VITALS
HEIGHT: 71 IN | BODY MASS INDEX: 26.04 KG/M2 | HEART RATE: 72 BPM | SYSTOLIC BLOOD PRESSURE: 124 MMHG | DIASTOLIC BLOOD PRESSURE: 78 MMHG | WEIGHT: 186 LBS

## 2019-11-06 DIAGNOSIS — Z72.0 TOBACCO ABUSE: ICD-10-CM

## 2019-11-06 DIAGNOSIS — I10 ESSENTIAL (PRIMARY) HYPERTENSION: Primary | ICD-10-CM

## 2019-11-06 DIAGNOSIS — E78.00 PURE HYPERCHOLESTEROLEMIA: ICD-10-CM

## 2019-11-06 PROCEDURE — 3078F DIAST BP <80 MM HG: CPT | Performed by: INTERNAL MEDICINE

## 2019-11-06 PROCEDURE — 99214 OFFICE O/P EST MOD 30 MIN: CPT | Performed by: INTERNAL MEDICINE

## 2019-11-06 PROCEDURE — 3074F SYST BP LT 130 MM HG: CPT | Performed by: INTERNAL MEDICINE

## 2019-11-18 DIAGNOSIS — G89.29 OTHER CHRONIC PAIN: ICD-10-CM

## 2019-11-18 RX ORDER — OXYCODONE HYDROCHLORIDE AND ACETAMINOPHEN 5; 325 MG/1; MG/1
1 TABLET ORAL EVERY 4 HOURS PRN
Qty: 120 TABLET | Refills: 0 | Status: SHIPPED | OUTPATIENT
Start: 2019-11-20 | End: 2019-12-16 | Stop reason: SDUPTHER

## 2019-12-16 DIAGNOSIS — G89.29 OTHER CHRONIC PAIN: ICD-10-CM

## 2019-12-16 RX ORDER — OXYCODONE HYDROCHLORIDE AND ACETAMINOPHEN 5; 325 MG/1; MG/1
1 TABLET ORAL EVERY 4 HOURS PRN
Qty: 120 TABLET | Refills: 0 | Status: SHIPPED | OUTPATIENT
Start: 2019-12-19 | End: 2020-01-13 | Stop reason: SDUPTHER

## 2019-12-18 LAB
ALBUMIN SERPL-MCNC: 5 G/DL (ref 3.5–5.5)
ALBUMIN/GLOB SERPL: 2.1 {RATIO} (ref 1.2–2.2)
ALP SERPL-CCNC: 94 IU/L (ref 39–117)
ALT SERPL-CCNC: 13 IU/L (ref 0–44)
AST SERPL-CCNC: 18 IU/L (ref 0–40)
BILIRUB SERPL-MCNC: 0.2 MG/DL (ref 0–1.2)
BUN SERPL-MCNC: 9 MG/DL (ref 6–24)
BUN/CREAT SERPL: 8 (ref 9–20)
CALCIUM SERPL-MCNC: 10.2 MG/DL (ref 8.7–10.2)
CHLORIDE SERPL-SCNC: 108 MMOL/L (ref 96–106)
CHOLEST SERPL-MCNC: 224 MG/DL (ref 100–199)
CO2 SERPL-SCNC: 21 MMOL/L (ref 20–29)
CREAT SERPL-MCNC: 1.07 MG/DL (ref 0.76–1.27)
GLOBULIN SER-MCNC: 2.4 G/DL (ref 1.5–4.5)
GLUCOSE SERPL-MCNC: 102 MG/DL (ref 65–99)
HDLC SERPL-MCNC: 49 MG/DL
LDLC SERPL CALC-MCNC: 152 MG/DL (ref 0–99)
LDLC/HDLC SERPL: 3.1 RATIO (ref 0–3.6)
POTASSIUM SERPL-SCNC: 4.8 MMOL/L (ref 3.5–5.2)
PROT SERPL-MCNC: 7.4 G/DL (ref 6–8.5)
SL AMB EGFR AFRICAN AMERICAN: 95 ML/MIN/1.73
SL AMB EGFR NON AFRICAN AMERICAN: 82 ML/MIN/1.73
SL AMB VLDL CHOLESTEROL CALC: 23 MG/DL (ref 5–40)
SODIUM SERPL-SCNC: 149 MMOL/L (ref 134–144)
TRIGL SERPL-MCNC: 114 MG/DL (ref 0–149)

## 2019-12-19 DIAGNOSIS — S96.912S STRAIN OF LEFT FOOT, SEQUELA: ICD-10-CM

## 2019-12-19 RX ORDER — TRAMADOL HYDROCHLORIDE 50 MG/1
TABLET ORAL
Qty: 120 TABLET | Refills: 0 | Status: SHIPPED | OUTPATIENT
Start: 2019-12-19 | End: 2020-01-20

## 2020-01-08 ENCOUNTER — OFFICE VISIT (OUTPATIENT)
Dept: FAMILY MEDICINE CLINIC | Facility: CLINIC | Age: 50
End: 2020-01-08
Payer: COMMERCIAL

## 2020-01-08 VITALS
DIASTOLIC BLOOD PRESSURE: 80 MMHG | BODY MASS INDEX: 26.04 KG/M2 | WEIGHT: 186 LBS | OXYGEN SATURATION: 98 % | HEART RATE: 65 BPM | HEIGHT: 71 IN | SYSTOLIC BLOOD PRESSURE: 122 MMHG

## 2020-01-08 DIAGNOSIS — I63.9 CEREBROVASCULAR ACCIDENT (CVA), UNSPECIFIED MECHANISM (HCC): ICD-10-CM

## 2020-01-08 DIAGNOSIS — I10 ESSENTIAL HYPERTENSION: Primary | ICD-10-CM

## 2020-01-08 DIAGNOSIS — G89.29 OTHER CHRONIC PAIN: ICD-10-CM

## 2020-01-08 DIAGNOSIS — Z00.00 WELLNESS EXAMINATION: ICD-10-CM

## 2020-01-08 PROCEDURE — 3008F BODY MASS INDEX DOCD: CPT | Performed by: FAMILY MEDICINE

## 2020-01-08 PROCEDURE — 3074F SYST BP LT 130 MM HG: CPT | Performed by: FAMILY MEDICINE

## 2020-01-08 PROCEDURE — 99396 PREV VISIT EST AGE 40-64: CPT | Performed by: FAMILY MEDICINE

## 2020-01-08 PROCEDURE — 3079F DIAST BP 80-89 MM HG: CPT | Performed by: FAMILY MEDICINE

## 2020-01-08 PROCEDURE — 99214 OFFICE O/P EST MOD 30 MIN: CPT | Performed by: FAMILY MEDICINE

## 2020-01-08 NOTE — PROGRESS NOTES
HPI:  Yosef Mendoza is a 54 y o  male here for his yearly health maintenance exam    Patient Active Problem List   Diagnosis    Chronic pain    History of CVA (cerebrovascular accident)    Hypertension    Tobacco abuse    PFO (patent foramen ovale)    Stroke [facility])     Past Medical History:   Diagnosis Date    Anxiety     Chronic pain     Headache, tension-type     Hyperlipidemia     Hypertension     Stroke ([facility] [address] )        1  Advanced Directive: n     2  Durable Power of  for Healthcare: n     3  Social History:           Drug and alcohol History: n                  4  Immunizations up to date: y                 Lifestyle:                           Healthy Diet:y                          Alcohol Use:y                          Tobacco Use:n                          Regular exercise:y                          Weight concerns:n                               5   Over the past 2 weeks, how often have you been bothered by the following:              Little interest or pleasure in doing things:n              Felling down, depressed or hopeless:n       Current Outpatient Medications   Medication Sig Dispense Refill    amLODIPine (NORVASC) 10 mg tablet Take 1 tablet (10 mg total) by mouth daily 90 tablet 3    aspirin 81 mg chewable tablet chew and swallow 1 tablet by mouth once daily 100 tablet 0    atorvastatin (LIPITOR) 40 mg tablet Take 1 tablet (40 mg total) by mouth daily with dinner 90 tablet 0    diazepam (VALIUM) 10 mg tablet take 1 tablet by mouth every 8 hours if needed for anxiety 90 tablet 5    FLUoxetine (PROzac) 20 MG tablet Take 1 tablet (20 mg total) by mouth daily (Patient not taking: Reported on 11/6/2019) 90 tablet 3    lisinopril (ZESTRIL) 10 mg tablet take 1 tablet by mouth once daily 90 tablet 0    oxyCODONE-acetaminophen (PERCOCET) 5-325 mg per tablet Take 1 tablet by mouth every 4 (four) hours as needed for moderate painMax Daily Amount: 6 tablets 120 tablet 0    traMADol (ULTRAM) 50 mg tablet take 1 tablet by mouth every 4 hours if needed 120 tablet 0    zolpidem (AMBIEN) 10 mg tablet take 1 tablet by mouth at bedtime for sleep 30 tablet 5     No current facility-administered medications for this visit  No Known Allergies  There is no immunization history for the selected administration types on file for this patient  Patient Care Team:  Aurelia Bamberger, MD as PCP - General    Review of Systems   Constitutional: Negative for fatigue, fever and unexpected weight change  HENT: Negative for congestion, sinus pressure and sore throat  Eyes: Negative for visual disturbance  Respiratory: Negative for shortness of breath and wheezing  Cardiovascular: Negative for chest pain and palpitations  Gastrointestinal: Negative for abdominal pain, diarrhea, nausea and vomiting  Physical Exam :  Physical Exam   Constitutional: He appears well-developed and well-nourished  No distress  HENT:   Right Ear: Tympanic membrane, external ear and ear canal normal  Tympanic membrane is not injected  Left Ear: Tympanic membrane, external ear and ear canal normal  Tympanic membrane is not injected  Nose: Nose normal    Mouth/Throat: Uvula is midline, oropharynx is clear and moist and mucous membranes are normal    Eyes: Pupils are equal, round, and reactive to light  Conjunctivae are normal    Neck: Normal range of motion  Neck supple  No thyromegaly present  Cardiovascular: Normal rate, regular rhythm and normal heart sounds  No murmur heard  Pulmonary/Chest: Effort normal and breath sounds normal  No respiratory distress  He has no wheezes  Lymphadenopathy:     He has no cervical adenopathy  Skin: He is not diaphoretic  Assessment and Plan:  1  Essential hypertension     2  Cerebrovascular accident (CVA), unspecified mechanism ([address] )     3  Other chronic pain     4   Wellness examination         Health Maintenance Due   Topic Date Due    Pneumococcal Vaccine: Pediatrics (0 to 5 Years) and At-Risk Patients (6 to 59 Years) (1 of 1 - PPSV23) 07/28/1978    DTaP,Tdap,and Td Vaccines (1 - Tdap) 07/28/1983    HIV Screening  07/28/1987

## 2020-01-08 NOTE — PROGRESS NOTES
[facility]        NAME: Darrell Garcia is a 54 y o  male  : 1970    MRN: [id number]  DATE: 2020  TIME: 5:03 PM    Assessment and Plan   Essential hypertension [I10]  1  Essential hypertension     2  Cerebrovascular accident (CVA), unspecified mechanism ([facility] [address] )     3  Other chronic pain     4  Wellness examination           Patient Instructions     There are no Patient Instructions on file for this visit  Chief Complaint     Chief Complaint   Patient presents with    Follow-up     6 mon check         History of Present Illness       F/u for assessed med cond--stable      Review of Systems   Review of Systems   Constitutional: Negative for fatigue, fever and unexpected weight change  HENT: Negative for congestion, sinus pain and sore throat  Eyes: Negative for visual disturbance  Respiratory: Negative for shortness of breath and wheezing  Cardiovascular: Negative for chest pain and palpitations  Gastrointestinal: Negative for abdominal pain, nausea and vomiting  Musculoskeletal: Negative  Negative for arthralgias and myalgias  Neurological: Negative for syncope, weakness and numbness  Psychiatric/Behavioral: Negative  Negative for confusion, dysphoric mood and suicidal ideas           Current Medications       Current Outpatient Medications:     amLODIPine (NORVASC) 10 mg tablet, Take 1 tablet (10 mg total) by mouth daily, Disp: 90 tablet, Rfl: 3    aspirin 81 mg chewable tablet, chew and swallow 1 tablet by mouth once daily, Disp: 100 tablet, Rfl: 0    atorvastatin (LIPITOR) 40 mg tablet, Take 1 tablet (40 mg total) by mouth daily with dinner, Disp: 90 tablet, Rfl: 0    diazepam (VALIUM) 10 mg tablet, take 1 tablet by mouth every 8 hours if needed for anxiety, Disp: 90 tablet, Rfl: 5    FLUoxetine (PROzac) 20 MG tablet, Take 1 tablet (20 mg total) by mouth daily (Patient not taking: Reported on 2019), Disp: 90 tablet, Rfl: 3   lisinopril (ZESTRIL) 10 mg tablet, take 1 tablet by mouth once daily, Disp: 90 tablet, Rfl: 0    oxyCODONE-acetaminophen (PERCOCET) 5-325 mg per tablet, Take 1 tablet by mouth every 4 (four) hours as needed for moderate painMax Daily Amount: 6 tablets, Disp: 120 tablet, Rfl: 0    traMADol (ULTRAM) 50 mg tablet, take 1 tablet by mouth every 4 hours if needed, Disp: 120 tablet, Rfl: 0    zolpidem (AMBIEN) 10 mg tablet, take 1 tablet by mouth at bedtime for sleep, Disp: 30 tablet, Rfl: 5    Current Allergies     Allergies as of 01/08/2020    (No Known Allergies)            The following portions of the patient's history were reviewed and updated as appropriate: allergies, current medications, past family history, past medical history, past social history, past surgical history and problem list      Past Medical History:   Diagnosis Date    Anxiety     Chronic pain     Headache, tension-type     Hyperlipidemia     Hypertension     Stroke [facility])        Past Surgical History:   Procedure Laterality Date    HERNIA REPAIR         Family History   Problem Relation Age of Onset    Hypertension Mother     Multiple sclerosis Mother     Stroke Mother     Hypertension Father     Atrial fibrillation Father     Diabetes Family     Stroke Family     Substance Abuse Neg Hx         family history    Mental illness Neg Hx         family history         Medications have been verified  Objective   /80   Pulse 65   Ht 5' 10 5" (1 791 m)   Wt 84 4 kg (186 lb)   SpO2 98%   BMI 26 31 kg/m²        Physical Exam     Physical Exam   Constitutional: He is oriented to person, place, and time  Vital signs are normal  He appears well-developed and well-nourished  HENT:   Right Ear: Ear canal normal  Tympanic membrane is not injected  Left Ear: Ear canal normal  Tympanic membrane is not injected     Nose: Nose normal    Mouth/Throat: Oropharynx is clear and moist    Eyes: Pupils are equal, round, and reactive to light  Conjunctivae and EOM are normal  Right eye exhibits no discharge  Left eye exhibits no discharge  Neck: Normal range of motion  Neck supple  No thyromegaly present  Cardiovascular: Normal rate, regular rhythm and normal heart sounds  No murmur heard  Pulmonary/Chest: Effort normal and breath sounds normal  No respiratory distress  He has no wheezes  Abdominal: Soft  Bowel sounds are normal  He exhibits no distension  There is no tenderness  Musculoskeletal: Normal range of motion  Lymphadenopathy:     He has no cervical adenopathy  Neurological: He is alert and oriented to person, place, and time  He has normal strength and normal reflexes  He is not disoriented  No sensory deficit  Gait normal    Skin: Skin is warm and dry  Psychiatric: He has a normal mood and affect  His speech is normal and behavior is normal  Judgment and thought content normal  Cognition and memory are normal        BMI Counseling: Body mass index is 26 31 kg/m²  The BMI is above normal  Nutrition recommendations include reducing portion sizes

## 2020-01-13 DIAGNOSIS — G89.29 OTHER CHRONIC PAIN: ICD-10-CM

## 2020-01-14 DIAGNOSIS — G47.00 INSOMNIA, UNSPECIFIED TYPE: ICD-10-CM

## 2020-01-14 RX ORDER — OXYCODONE HYDROCHLORIDE AND ACETAMINOPHEN 5; 325 MG/1; MG/1
1 TABLET ORAL EVERY 4 HOURS PRN
Qty: 120 TABLET | Refills: 0 | Status: SHIPPED | OUTPATIENT
Start: 2020-01-17 | End: 2020-02-14 | Stop reason: SDUPTHER

## 2020-01-15 RX ORDER — ZOLPIDEM TARTRATE 10 MG/1
TABLET ORAL
Qty: 30 TABLET | Refills: 0 | Status: SHIPPED | OUTPATIENT
Start: 2020-01-16 | End: 2020-03-31

## 2020-01-17 ENCOUNTER — OFFICE VISIT (OUTPATIENT)
Dept: NEUROLOGY | Facility: CLINIC | Age: 50
End: 2020-01-17
Payer: COMMERCIAL

## 2020-01-17 VITALS
WEIGHT: 190 LBS | BODY MASS INDEX: 26.88 KG/M2 | HEART RATE: 82 BPM | SYSTOLIC BLOOD PRESSURE: 136 MMHG | DIASTOLIC BLOOD PRESSURE: 84 MMHG

## 2020-01-17 DIAGNOSIS — Z72.0 TOBACCO ABUSE: ICD-10-CM

## 2020-01-17 DIAGNOSIS — Z86.73 HISTORY OF CVA (CEREBROVASCULAR ACCIDENT): Primary | ICD-10-CM

## 2020-01-17 PROCEDURE — 99214 OFFICE O/P EST MOD 30 MIN: CPT | Performed by: PHYSICIAN ASSISTANT

## 2020-01-17 NOTE — PROGRESS NOTES
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  He denies any new symptoms to indicate recurrent TIA/CVA  Etiology of stroke still unclear, although more likely small vessel in the setting of uncontrolled vascular risk factors at the time (hypertensive, tobacco use)  He was found to have a small PFO, which cardiology did not feel was contributory to his stroke  He has continued to follow with cardiology  He is currently maintained on ASA and statin  He is still smoking, but has cut down quite a bit  He follows with PCP and blood pressure has been well controlled  He is having some left eye discomfort, watering  I suggested he see an ophthalmologist   He will call his insurance and get a list of covered providers and make an appt  Plan:  -continue ASA 81mg daily and Lipitor 40mg daily, along with appropriate blood pressure control (<130/80) for secondary stroke prevention  -complete smoking cessation advised  -continue to follow with PCP for management of cardiovascular risk factors including blood pressure, blood sugar and lipids  -optho exam due to left eye watering and discomfort  -follow up in 6 months or sooner if needed  -re-reviewed signs and symptoms of stroke and when to call 911/go to ED       Diagnoses and all orders for this visit:    History of CVA (cerebrovascular accident)           Subjective:    HPI    Patient is a 54year old male with PMH of HTN, HLD, tobacco use, chronic pain, who presents today for follow up regarding his prior stroke that occurred in April 2019  Patient presented with several days of intermittent right face, arm and leg weakness, as well as intermittent double vision and HA  BP on arrival to the ED was 213/128  CTH unremarkable  CTA headache neck without significant occlusion or stenosis   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  Lipid panel: total cholesterol 161, triglycerides 109, HDL 38,   Hemoglobin A1c 5 6  It was felt that given the location of his infarction and CV risk factors, likely small vessel etiology, however given young age, other sources, particularly cardiac, need to be looked at  TTE with EF 55%, possible tiny PFO with very small right to left shunt on bubble study  SAHARA was performed after cardiology consult  This demonstrated small PFO with very small right to left shunt during provocative measures  Hypercoag panel was unremarkable  Cardiology felt that the PFO was so tiny and would make it a low risk for the cause of CVA  He was discharged on ASA, Lipitor  Today, patient reports he is overall doing well  He denies any new symptoms at this time  He does report sometimes his left eye al and is sore  He has not seen optho  When his eye bothers him, he sometimes get a mild HA  He continues to smoke, is smoking about ¼ ppd instead of a full ppd  He continues to work on quitting  He follows with PCP, taking all meds as prescribed  He has continued to follow with cardiology, who did not feel PFO contributed to the stroke  The following portions of the patient's history were reviewed and updated as appropriate: current medications, past family history, past medical history, past social history, past surgical history and problem list          Objective:    Blood pressure 136/84, pulse 82, weight 86 2 kg (190 lb)  Physical Exam   Constitutional: He appears well-developed and well-nourished  HENT:   Head: Normocephalic and atraumatic  Eyes: Pupils are equal, round, and reactive to light  EOM are normal    Cardiovascular: Intact distal pulses  Pulmonary/Chest: Effort normal    Neurological: He has normal strength and normal reflexes  Coordination normal    Skin: Skin is warm and dry  Psychiatric: He has a normal mood and affect   His speech is normal  Neurological Exam  Mental Status   Oriented to person, place, time and situation  Speech is normal  Language is fluent with no aphasia  Attention and concentration are normal     Cranial Nerves  CN II: Visual fields full to confrontation  CN III, IV, VI: Extraocular movements intact bilaterally  Pupils equal round and reactive to light bilaterally  CN V: Facial sensation is normal   CN VII: Full and symmetric facial movement  CN VIII: Hearing is normal   CN IX, X: Palate elevates symmetrically  CN XI: Shoulder shrug strength is normal   CN XII: Tongue midline without atrophy or fasciculations  Motor   Normal muscle tone  Strength is 5/5 throughout all four extremities  Sensory  Light touch is normal in upper and lower extremities  Reflexes  Deep tendon reflexes are 2+ and symmetric in all four extremities with downgoing toes bilaterally  Coordination  Finger-to-nose, rapid alternating movements and heel-to-shin normal bilaterally without dysmetria  Gait  Casual gait is normal including stance, stride, and arm swing  ROS:    Review of Systems   Constitutional: Negative  Negative for appetite change and fever  HENT: Negative  Negative for hearing loss, tinnitus, trouble swallowing and voice change  Eyes: Negative  Negative for photophobia and pain  L eye twitching and sore   Respiratory: Negative  Negative for shortness of breath  Cardiovascular: Negative  Negative for palpitations  Gastrointestinal: Negative  Negative for nausea and vomiting  Endocrine: Negative  Negative for cold intolerance and heat intolerance  Genitourinary: Negative  Negative for dysuria, frequency and urgency  Musculoskeletal: Negative  Negative for myalgias and neck pain  Skin: Negative  Negative for rash  Neurological: Positive for headaches  Negative for dizziness, tremors, seizures, syncope, facial asymmetry, speech difficulty, weakness, light-headedness and numbness  Hematological: Negative  Does not bruise/bleed easily  Psychiatric/Behavioral: Negative  Negative for confusion, hallucinations and sleep disturbance       I personally reviewed and updated the ROS as appropriate

## 2020-01-17 NOTE — PATIENT INSTRUCTIONS
Continue aspirin and lipitor for secondary stroke prevention  Continue to follow with PCP for management of blood pressure, cholesterol and blood sugar  Would suggest smoking cessation  Would suggest seeing an ophthalmologist to get a complete eye exam  Follow up in 6 months or sooner if needed  Call for any new symptoms   If you experience any facial droop, weakness on one side of the body, speech or swallowing difficulty, painless loss of vision in one eye, double vision, vertigo that does not resolve quickly/imbalance, go to the ER/call [phone]

## 2020-01-18 DIAGNOSIS — S96.912S STRAIN OF LEFT FOOT, SEQUELA: ICD-10-CM

## 2020-01-20 RX ORDER — TRAMADOL HYDROCHLORIDE 50 MG/1
TABLET ORAL
Qty: 120 TABLET | Refills: 0 | Status: SHIPPED | OUTPATIENT
Start: 2020-01-20 | End: 2020-02-18

## 2020-01-22 DIAGNOSIS — I16.1 HYPERTENSIVE EMERGENCY: ICD-10-CM

## 2020-01-22 DIAGNOSIS — I63.9 STROKE (CEREBRUM) (HCC): ICD-10-CM

## 2020-01-22 RX ORDER — LISINOPRIL 10 MG/1
TABLET ORAL
Qty: 90 TABLET | Refills: 0 | Status: SHIPPED | OUTPATIENT
Start: 2020-01-22 | End: 2020-03-23

## 2020-01-22 RX ORDER — ASPIRIN 81 MG/1
TABLET, CHEWABLE ORAL
Qty: 100 TABLET | Refills: 0 | Status: SHIPPED | OUTPATIENT
Start: 2020-01-22 | End: 2020-03-23

## 2020-02-14 DIAGNOSIS — G89.29 OTHER CHRONIC PAIN: ICD-10-CM

## 2020-02-16 RX ORDER — OXYCODONE HYDROCHLORIDE AND ACETAMINOPHEN 5; 325 MG/1; MG/1
1 TABLET ORAL EVERY 4 HOURS PRN
Qty: 120 TABLET | Refills: 0 | Status: SHIPPED | OUTPATIENT
Start: 2020-02-16 | End: 2020-03-13 | Stop reason: SDUPTHER

## 2020-02-17 DIAGNOSIS — S96.912S STRAIN OF LEFT FOOT, SEQUELA: ICD-10-CM

## 2020-02-18 RX ORDER — TRAMADOL HYDROCHLORIDE 50 MG/1
TABLET ORAL
Qty: 120 TABLET | Refills: 5 | Status: SHIPPED | OUTPATIENT
Start: 2020-02-18 | End: 2020-03-18 | Stop reason: SDUPTHER

## 2020-03-12 DIAGNOSIS — G89.29 OTHER CHRONIC PAIN: ICD-10-CM

## 2020-03-12 RX ORDER — OXYCODONE HYDROCHLORIDE AND ACETAMINOPHEN 5; 325 MG/1; MG/1
1 TABLET ORAL EVERY 4 HOURS PRN
Qty: 120 TABLET | Refills: 0 | Status: CANCELLED | OUTPATIENT
Start: 2020-03-12

## 2020-03-14 RX ORDER — OXYCODONE HYDROCHLORIDE AND ACETAMINOPHEN 5; 325 MG/1; MG/1
1 TABLET ORAL EVERY 4 HOURS PRN
Qty: 120 TABLET | Refills: 0 | Status: SHIPPED | OUTPATIENT
Start: 2020-03-16 | End: 2020-04-13 | Stop reason: SDUPTHER

## 2020-03-18 DIAGNOSIS — S96.912S STRAIN OF LEFT FOOT, SEQUELA: ICD-10-CM

## 2020-03-18 RX ORDER — TRAMADOL HYDROCHLORIDE 50 MG/1
50 TABLET ORAL EVERY 4 HOURS PRN
Qty: 120 TABLET | Refills: 3 | Status: SHIPPED | OUTPATIENT
Start: 2020-03-18 | End: 2020-07-13

## 2020-03-22 DIAGNOSIS — I63.9 STROKE (CEREBRUM) (HCC): ICD-10-CM

## 2020-03-22 DIAGNOSIS — I16.1 HYPERTENSIVE EMERGENCY: ICD-10-CM

## 2020-03-23 RX ORDER — LISINOPRIL 10 MG/1
TABLET ORAL
Qty: 90 TABLET | Refills: 0 | Status: SHIPPED | OUTPATIENT
Start: 2020-03-23 | End: 2020-04-15

## 2020-03-23 RX ORDER — ASPIRIN 81 MG/1
TABLET, CHEWABLE ORAL
Qty: 100 TABLET | Refills: 0 | Status: SHIPPED | OUTPATIENT
Start: 2020-03-23 | End: 2020-08-20 | Stop reason: SDUPTHER

## 2020-03-30 DIAGNOSIS — G47.00 INSOMNIA, UNSPECIFIED TYPE: ICD-10-CM

## 2020-03-31 DIAGNOSIS — S96.912S STRAIN OF LEFT FOOT, SEQUELA: ICD-10-CM

## 2020-03-31 RX ORDER — ZOLPIDEM TARTRATE 10 MG/1
TABLET ORAL
Qty: 30 TABLET | Refills: 2 | Status: SHIPPED | OUTPATIENT
Start: 2020-03-31 | End: 2020-08-05

## 2020-04-01 RX ORDER — DIAZEPAM 10 MG/1
TABLET ORAL
Qty: 90 TABLET | Refills: 3 | Status: SHIPPED | OUTPATIENT
Start: 2020-04-01 | End: 2020-07-27

## 2020-04-13 DIAGNOSIS — G89.29 OTHER CHRONIC PAIN: ICD-10-CM

## 2020-04-13 RX ORDER — OXYCODONE HYDROCHLORIDE AND ACETAMINOPHEN 5; 325 MG/1; MG/1
1 TABLET ORAL EVERY 4 HOURS PRN
Qty: 120 TABLET | Refills: 0 | Status: SHIPPED | OUTPATIENT
Start: 2020-04-13 | End: 2020-05-11 | Stop reason: SDUPTHER

## 2020-04-14 DIAGNOSIS — I16.1 HYPERTENSIVE EMERGENCY: ICD-10-CM

## 2020-04-15 RX ORDER — LISINOPRIL 10 MG/1
TABLET ORAL
Qty: 90 TABLET | Refills: 0 | Status: SHIPPED | OUTPATIENT
Start: 2020-04-15 | End: 2020-11-03

## 2020-04-30 DIAGNOSIS — I16.1 HYPERTENSIVE EMERGENCY: ICD-10-CM

## 2020-05-01 RX ORDER — AMLODIPINE BESYLATE 10 MG/1
TABLET ORAL
Qty: 90 TABLET | Refills: 2 | Status: SHIPPED | OUTPATIENT
Start: 2020-05-01 | End: 2020-05-26

## 2020-05-11 ENCOUNTER — OFFICE VISIT (OUTPATIENT)
Dept: FAMILY MEDICINE CLINIC | Facility: CLINIC | Age: 50
End: 2020-05-11
Payer: COMMERCIAL

## 2020-05-11 VITALS
HEART RATE: 91 BPM | DIASTOLIC BLOOD PRESSURE: 80 MMHG | SYSTOLIC BLOOD PRESSURE: 132 MMHG | TEMPERATURE: 97.7 F | OXYGEN SATURATION: 97 % | HEIGHT: 70 IN | WEIGHT: 189 LBS | BODY MASS INDEX: 27.06 KG/M2

## 2020-05-11 DIAGNOSIS — I10 ESSENTIAL HYPERTENSION: ICD-10-CM

## 2020-05-11 DIAGNOSIS — Z72.0 TOBACCO ABUSE: ICD-10-CM

## 2020-05-11 DIAGNOSIS — Z86.73 HISTORY OF CVA (CEREBROVASCULAR ACCIDENT): ICD-10-CM

## 2020-05-11 DIAGNOSIS — I63.9 CEREBROVASCULAR ACCIDENT (CVA), UNSPECIFIED MECHANISM (HCC): Primary | ICD-10-CM

## 2020-05-11 DIAGNOSIS — G89.29 OTHER CHRONIC PAIN: ICD-10-CM

## 2020-05-11 PROCEDURE — 99214 OFFICE O/P EST MOD 30 MIN: CPT | Performed by: FAMILY MEDICINE

## 2020-05-11 PROCEDURE — 3079F DIAST BP 80-89 MM HG: CPT | Performed by: FAMILY MEDICINE

## 2020-05-11 PROCEDURE — 4004F PT TOBACCO SCREEN RCVD TLK: CPT | Performed by: FAMILY MEDICINE

## 2020-05-11 PROCEDURE — 3075F SYST BP GE 130 - 139MM HG: CPT | Performed by: FAMILY MEDICINE

## 2020-05-11 PROCEDURE — 3008F BODY MASS INDEX DOCD: CPT | Performed by: FAMILY MEDICINE

## 2020-05-11 RX ORDER — OXYCODONE HYDROCHLORIDE AND ACETAMINOPHEN 5; 325 MG/1; MG/1
1 TABLET ORAL EVERY 4 HOURS PRN
Qty: 120 TABLET | Refills: 0 | Status: SHIPPED | OUTPATIENT
Start: 2020-05-11 | End: 2020-06-08 | Stop reason: SDUPTHER

## 2020-05-23 DIAGNOSIS — I16.1 HYPERTENSIVE EMERGENCY: ICD-10-CM

## 2020-05-26 RX ORDER — AMLODIPINE BESYLATE 10 MG/1
TABLET ORAL
Qty: 90 TABLET | Refills: 2 | Status: SHIPPED | OUTPATIENT
Start: 2020-05-26 | End: 2021-03-12

## 2020-05-29 ENCOUNTER — TELEMEDICINE (OUTPATIENT)
Dept: FAMILY MEDICINE CLINIC | Facility: CLINIC | Age: 50
End: 2020-05-29
Payer: COMMERCIAL

## 2020-05-29 DIAGNOSIS — G89.29 OTHER CHRONIC PAIN: ICD-10-CM

## 2020-05-29 DIAGNOSIS — Z86.73 HISTORY OF CVA (CEREBROVASCULAR ACCIDENT): ICD-10-CM

## 2020-05-29 DIAGNOSIS — F41.9 ANXIETY: Primary | ICD-10-CM

## 2020-05-29 PROCEDURE — 99213 OFFICE O/P EST LOW 20 MIN: CPT | Performed by: FAMILY MEDICINE

## 2020-06-03 ENCOUNTER — TELEPHONE (OUTPATIENT)
Dept: NEUROLOGY | Facility: CLINIC | Age: 50
End: 2020-06-03

## 2020-06-08 DIAGNOSIS — G89.29 OTHER CHRONIC PAIN: ICD-10-CM

## 2020-06-08 RX ORDER — OXYCODONE HYDROCHLORIDE AND ACETAMINOPHEN 5; 325 MG/1; MG/1
1 TABLET ORAL EVERY 4 HOURS PRN
Qty: 120 TABLET | Refills: 0 | Status: SHIPPED | OUTPATIENT
Start: 2020-06-08 | End: 2020-07-06 | Stop reason: SDUPTHER

## 2020-06-30 ENCOUNTER — TELEPHONE (OUTPATIENT)
Dept: NEUROLOGY | Facility: CLINIC | Age: 50
End: 2020-06-30

## 2020-07-06 DIAGNOSIS — G89.29 OTHER CHRONIC PAIN: ICD-10-CM

## 2020-07-06 RX ORDER — OXYCODONE HYDROCHLORIDE AND ACETAMINOPHEN 5; 325 MG/1; MG/1
1 TABLET ORAL EVERY 4 HOURS PRN
Qty: 120 TABLET | Refills: 0 | Status: SHIPPED | OUTPATIENT
Start: 2020-07-06 | End: 2020-07-31 | Stop reason: SDUPTHER

## 2020-07-13 DIAGNOSIS — S96.912S STRAIN OF LEFT FOOT, SEQUELA: ICD-10-CM

## 2020-07-13 RX ORDER — TRAMADOL HYDROCHLORIDE 50 MG/1
TABLET ORAL
Qty: 120 TABLET | Refills: 5 | Status: SHIPPED | OUTPATIENT
Start: 2020-07-13 | End: 2020-12-30

## 2020-07-25 DIAGNOSIS — S96.912S STRAIN OF LEFT FOOT, SEQUELA: ICD-10-CM

## 2020-07-27 RX ORDER — DIAZEPAM 10 MG/1
TABLET ORAL
Qty: 90 TABLET | Refills: 5 | Status: SHIPPED | OUTPATIENT
Start: 2020-07-27 | End: 2021-01-16

## 2020-07-31 ENCOUNTER — APPOINTMENT (EMERGENCY)
Dept: RADIOLOGY | Facility: HOSPITAL | Age: 50
End: 2020-07-31
Payer: OTHER MISCELLANEOUS

## 2020-07-31 ENCOUNTER — HOSPITAL ENCOUNTER (EMERGENCY)
Facility: HOSPITAL | Age: 50
Discharge: HOME | End: 2020-07-31
Attending: EMERGENCY MEDICINE | Admitting: EMERGENCY MEDICINE
Payer: OTHER MISCELLANEOUS

## 2020-07-31 VITALS
SYSTOLIC BLOOD PRESSURE: 150 MMHG | HEIGHT: 70 IN | TEMPERATURE: 98.3 F | HEART RATE: 88 BPM | RESPIRATION RATE: 20 BRPM | OXYGEN SATURATION: 98 % | WEIGHT: 190 LBS | BODY MASS INDEX: 27.2 KG/M2 | DIASTOLIC BLOOD PRESSURE: 100 MMHG

## 2020-07-31 DIAGNOSIS — T67.5XXA HEAT EXHAUSTION, INITIAL ENCOUNTER: ICD-10-CM

## 2020-07-31 DIAGNOSIS — R07.89 ATYPICAL CHEST PAIN: Primary | ICD-10-CM

## 2020-07-31 DIAGNOSIS — G89.29 OTHER CHRONIC PAIN: ICD-10-CM

## 2020-07-31 LAB
ALBUMIN SERPL BCP-MCNC: 3.9 G/DL (ref 3.5–5)
ALP SERPL-CCNC: 83 U/L (ref 46–116)
ALT SERPL W P-5'-P-CCNC: 25 U/L (ref 12–78)
ANION GAP SERPL CALCULATED.3IONS-SCNC: 11 MMOL/L (ref 4–13)
AST SERPL W P-5'-P-CCNC: 18 U/L (ref 5–45)
ATRIAL RATE: 88 BPM
BASOPHILS # BLD AUTO: 0.07 THOUSANDS/ΜL (ref 0–0.1)
BASOPHILS NFR BLD AUTO: 0 % (ref 0–1)
BILIRUB SERPL-MCNC: 0.5 MG/DL (ref 0.2–1)
BUN SERPL-MCNC: 10 MG/DL (ref 5–25)
CALCIUM SERPL-MCNC: 8.8 MG/DL (ref 8.3–10.1)
CHLORIDE SERPL-SCNC: 104 MMOL/L (ref 100–108)
CO2 SERPL-SCNC: 24 MMOL/L (ref 21–32)
CREAT SERPL-MCNC: 1.05 MG/DL (ref 0.6–1.3)
EOSINOPHIL # BLD AUTO: 0.11 THOUSAND/ΜL (ref 0–0.61)
EOSINOPHIL NFR BLD AUTO: 1 % (ref 0–6)
ERYTHROCYTE [DISTWIDTH] IN BLOOD BY AUTOMATED COUNT: 13.3 % (ref 11.6–15.1)
GFR SERPL CREATININE-BSD FRML MDRD: 84 ML/MIN/1.73SQ M
GLUCOSE SERPL-MCNC: 100 MG/DL (ref 65–140)
HCT VFR BLD AUTO: 43.1 % (ref 36.5–49.3)
HGB BLD-MCNC: 14.8 G/DL (ref 12–17)
IMM GRANULOCYTES # BLD AUTO: 0.05 THOUSAND/UL (ref 0–0.2)
IMM GRANULOCYTES NFR BLD AUTO: 0 % (ref 0–2)
LYMPHOCYTES # BLD AUTO: 1.82 THOUSANDS/ΜL (ref 0.6–4.47)
LYMPHOCYTES NFR BLD AUTO: 12 % (ref 14–44)
MCH RBC QN AUTO: 31.2 PG (ref 26.8–34.3)
MCHC RBC AUTO-ENTMCNC: 34.3 G/DL (ref 31.4–37.4)
MCV RBC AUTO: 91 FL (ref 82–98)
MONOCYTES # BLD AUTO: 0.87 THOUSAND/ΜL (ref 0.17–1.22)
MONOCYTES NFR BLD AUTO: 6 % (ref 4–12)
NEUTROPHILS # BLD AUTO: 12.92 THOUSANDS/ΜL (ref 1.85–7.62)
NEUTS SEG NFR BLD AUTO: 81 % (ref 43–75)
NRBC BLD AUTO-RTO: 0 /100 WBCS
P AXIS: 66 DEGREES
PLATELET # BLD AUTO: 201 THOUSANDS/UL (ref 149–390)
PMV BLD AUTO: 11.8 FL (ref 8.9–12.7)
POTASSIUM SERPL-SCNC: 4.1 MMOL/L (ref 3.5–5.3)
PR INTERVAL: 156 MS
PROT SERPL-MCNC: 7.3 G/DL (ref 6.4–8.2)
QRS AXIS: -1 DEGREES
QRSD INTERVAL: 96 MS
QT INTERVAL: 352 MS
QTC INTERVAL: 425 MS
RBC # BLD AUTO: 4.75 MILLION/UL (ref 3.88–5.62)
SODIUM SERPL-SCNC: 139 MMOL/L (ref 136–145)
T WAVE AXIS: 39 DEGREES
TROPONIN I SERPL-MCNC: <0.02 NG/ML
VENTRICULAR RATE: 88 BPM
WBC # BLD AUTO: 15.84 THOUSAND/UL (ref 4.31–10.16)

## 2020-07-31 PROCEDURE — 93010 ELECTROCARDIOGRAM REPORT: CPT | Performed by: INTERNAL MEDICINE

## 2020-07-31 PROCEDURE — 96365 THER/PROPH/DIAG IV INF INIT: CPT

## 2020-07-31 PROCEDURE — 99285 EMERGENCY DEPT VISIT HI MDM: CPT | Performed by: PHYSICIAN ASSISTANT

## 2020-07-31 PROCEDURE — 80053 COMPREHEN METABOLIC PANEL: CPT | Performed by: PHYSICIAN ASSISTANT

## 2020-07-31 PROCEDURE — 36415 COLL VENOUS BLD VENIPUNCTURE: CPT | Performed by: PHYSICIAN ASSISTANT

## 2020-07-31 PROCEDURE — 71046 X-RAY EXAM CHEST 2 VIEWS: CPT

## 2020-07-31 PROCEDURE — 84484 ASSAY OF TROPONIN QUANT: CPT | Performed by: PHYSICIAN ASSISTANT

## 2020-07-31 PROCEDURE — 99285 EMERGENCY DEPT VISIT HI MDM: CPT

## 2020-07-31 PROCEDURE — 93005 ELECTROCARDIOGRAM TRACING: CPT

## 2020-07-31 PROCEDURE — 85025 COMPLETE CBC W/AUTO DIFF WBC: CPT | Performed by: PHYSICIAN ASSISTANT

## 2020-07-31 RX ORDER — SODIUM CHLORIDE, SODIUM GLUCONATE, SODIUM ACETATE, POTASSIUM CHLORIDE, MAGNESIUM CHLORIDE, SODIUM PHOSPHATE, DIBASIC, AND POTASSIUM PHOSPHATE .53; .5; .37; .037; .03; .012; .00082 G/100ML; G/100ML; G/100ML; G/100ML; G/100ML; G/100ML; G/100ML
1000 INJECTION, SOLUTION INTRAVENOUS ONCE
Status: COMPLETED | OUTPATIENT
Start: 2020-07-31 | End: 2020-07-31

## 2020-07-31 RX ORDER — OXYCODONE HYDROCHLORIDE AND ACETAMINOPHEN 5; 325 MG/1; MG/1
1 TABLET ORAL EVERY 4 HOURS PRN
Qty: 120 TABLET | Refills: 0 | Status: SHIPPED | OUTPATIENT
Start: 2020-07-31 | End: 2020-08-27 | Stop reason: SDUPTHER

## 2020-07-31 RX ADMIN — SODIUM CHLORIDE, SODIUM GLUCONATE, SODIUM ACETATE, POTASSIUM CHLORIDE, MAGNESIUM CHLORIDE, SODIUM PHOSPHATE, DIBASIC, AND POTASSIUM PHOSPHATE 1000 ML: .53; .5; .37; .037; .03; .012; .00082 INJECTION, SOLUTION INTRAVENOUS at 10:33

## 2020-07-31 NOTE — ED PROVIDER NOTES
History  Chief Complaint   Patient presents with    Chest Pain     patient presents to the ED with c/o lightheadedness and chest pain since this morning  states it got worse when he stood up at work      53 yo male w/ hx of HTN, HLD, CVA and anxiety presents to the Emergency Department via EMS for evaluation of chest pain beginning 6hrs PTA, upon awakening  States that he then went to work and symptoms persisted, no worse or better  He also notes mild SOB and lightheadedness  Eventually felt he could not continue with work and EMS was called  Pt currently reports CP/SOB and lightheadedness  No recent fevers, chills, sweats, N/V/D, vision changes  Has residual R hand weakness from his prior stroke, denies change in this symptoms  Was not working in high heat environment  He also notes bilateral foot tingling  On exam, pt is well appearing, afebrile, in no distress  Cardiopulmonary exam is benign, abdomen is soft/non tender  Radial pulses 2+ and symmetric, extremity cap refill < 2 seconds  Limit neurologic exam is non focal       A/P: Chest pain  Check CBC, CMP, troponin, EKG, CXR  Prior to Admission Medications   Prescriptions Last Dose Informant Patient Reported? Taking?    amLODIPine (NORVASC) 10 mg tablet   No No   Sig: take 1 tablet by mouth once daily   aspirin 81 mg chewable tablet   No No   Sig: chew and swallow 1 tablet by mouth once daily   atorvastatin (LIPITOR) 40 mg tablet  Self No No   Sig: Take 1 tablet (40 mg total) by mouth daily with dinner   diazepam (VALIUM) 10 mg tablet   No No   Sig: take 1 tablet by mouth every 8 hours if needed for anxiety   lisinopril (ZESTRIL) 10 mg tablet   No No   Sig: take 1 tablet by mouth once daily   oxyCODONE-acetaminophen (PERCOCET) 5-325 mg per tablet   No No   Sig: Take 1 tablet by mouth every 4 (four) hours as needed for moderate painMax Daily Amount: 6 tablets   traMADol (ULTRAM) 50 mg tablet   No No   Sig: take 1 tablet EVERY 4 HOURS AS NEEDED FOR MODERATE PAIN   zolpidem (AMBIEN) 10 mg tablet   No No   Sig: take 1 tablet by mouth at bedtime for sleep      Facility-Administered Medications: None       Past Medical History:   Diagnosis Date    Anxiety     Chronic pain     Headache, tension-type     Hyperlipidemia     Hypertension     Stroke [facility])        Past Surgical History:   Procedure Laterality Date    HERNIA REPAIR         Family History   Problem Relation Age of Onset    Hypertension Mother     Multiple sclerosis Mother     Stroke Mother     Hypertension Father     Atrial fibrillation Father     Diabetes Family     Stroke Family     Substance Abuse Neg Hx         family history    Mental illness Neg Hx         family history     I have reviewed and agree with the history as documented  E-Cigarette/Vaping     E-Cigarette/Vaping Substances     Social History     Tobacco Use    Smoking status: Current Every Day Smoker     Packs/day: 1 00    Smokeless tobacco: Never Used   Substance Use Topics    Alcohol use: No    Drug use: No       Review of Systems   Constitutional: Negative for chills, diaphoresis and fever  Eyes: Negative for visual disturbance  Respiratory: Positive for shortness of breath  Negative for cough  Cardiovascular: Positive for chest pain  Negative for palpitations  Gastrointestinal: Negative for abdominal pain, diarrhea, nausea and vomiting  Genitourinary: Negative for dysuria, flank pain and frequency  Musculoskeletal: Negative for arthralgias and myalgias  Skin: Negative for color change, rash and wound  Allergic/Immunologic: Negative for immunocompromised state  Neurological: Positive for light-headedness  Negative for dizziness  Hematological: Does not bruise/bleed easily  Psychiatric/Behavioral: Negative for confusion  The patient is not nervous/anxious  Physical Exam  Physical Exam   Constitutional: He is oriented to person, place, and time   He appears well-developed and well-nourished  No distress  HENT:   Head: Normocephalic and atraumatic  Mouth/Throat: Oropharynx is clear and moist    Eyes: Pupils are equal, round, and reactive to light  No scleral icterus  Neck: No JVD present  Cardiovascular: Normal rate and regular rhythm  Exam reveals no gallop and no friction rub  No murmur heard  Pulses:       Radial pulses are 2+ on the right side, and 2+ on the left side  Pulmonary/Chest: No respiratory distress  He has no wheezes  He has no rhonchi  He has no rales  Abdominal: Soft  Bowel sounds are normal  He exhibits no distension and no mass  There is no tenderness  There is no guarding  Musculoskeletal:        Right lower leg: He exhibits no edema  Left lower leg: He exhibits no edema  Neurological: He is alert and oriented to person, place, and time  Skin: Skin is warm and dry  Capillary refill takes less than 2 seconds  He is not diaphoretic  Psychiatric: He has a normal mood and affect  His behavior is normal  His mood appears not anxious  He is not agitated  Vitals reviewed        Vital Signs  ED Triage Vitals [07/31/20 1029]   Temperature Pulse Respirations Blood Pressure SpO2   98 3 °F (36 8 °C) 88 20 150/100 98 %      Temp Source Heart Rate Source Patient Position - Orthostatic VS BP Location FiO2 (%)   Temporal Monitor Lying Left arm --      Pain Score       5           Vitals:    07/31/20 1029   BP: 150/100   Pulse: 88   Patient Position - Orthostatic VS: Lying         Visual Acuity      ED Medications  Medications   multi-electrolyte (PLASMALYTE-A/ISOLYTE-S PH 7 4) IV solution 1,000 mL (0 mL Intravenous Stopped 7/31/20 1119)       Diagnostic Studies  Results Reviewed     Procedure Component Value Units Date/Time    Troponin I [[id number]]  (Normal) Collected:  07/31/20 1034    Lab Status:  Final result Specimen:  Blood from Arm, Right Updated:  07/31/20 1058     Troponin I <0 02 ng/mL     Comprehensive metabolic panel [[id number]] Collected: 07/31/20 1034    Lab Status:  Final result Specimen:  Blood from Arm, Right Updated:  07/31/20 1057     Sodium 139 mmol/L      Potassium 4 1 mmol/L      Chloride 104 mmol/L      CO2 24 mmol/L      ANION GAP 11 mmol/L      BUN 10 mg/dL      Creatinine 1 05 mg/dL      Glucose 100 mg/dL      Calcium 8 8 mg/dL      AST 18 U/L      ALT 25 U/L      Alkaline Phosphatase 83 U/L      Total Protein 7 3 g/dL      Albumin 3 9 g/dL      Total Bilirubin 0 50 mg/dL      eGFR 84 ml/min/1 73sq m     Narrative:       [facility] guidelines for [facility] (CKD):     Stage 1 with normal or high GFR (GFR > 90 mL/min/1 73 square meters)    Stage 2 Mild CKD (GFR = 60-89 mL/min/1 73 square meters)    Stage 3A Moderate CKD (GFR = 45-59 mL/min/1 73 square meters)    Stage 3B Moderate CKD (GFR = 30-44 mL/min/1 73 square meters)    Stage 4 Severe CKD (GFR = 15-29 mL/min/1 73 square meters)    Stage 5 End Stage CKD (GFR <15 mL/min/1 73 square meters)  Note: GFR calculation is accurate only with a steady state creatinine    CBC and differential [[id number]]  (Abnormal) Collected:  07/31/20 1034    Lab Status:  Final result Specimen:  Blood from Arm, Right Updated:  07/31/20 1041     WBC 15 84 Thousand/uL      RBC 4 75 Million/uL      Hemoglobin 14 8 g/dL      Hematocrit 43 1 %      MCV 91 fL      MCH 31 2 pg      MCHC 34 3 g/dL      RDW 13 3 %      MPV 11 8 fL      Platelets 923 Thousands/uL      nRBC 0 /100 WBCs      Neutrophils Relative 81 %      Immat GRANS % 0 %      Lymphocytes Relative 12 %      Monocytes Relative 6 %      Eosinophils Relative 1 %      Basophils Relative 0 %      Neutrophils Absolute 12 92 Thousands/µL      Immature Grans Absolute 0 05 Thousand/uL      Lymphocytes Absolute 1 82 Thousands/µL      Monocytes Absolute 0 87 Thousand/µL      Eosinophils Absolute 0 11 Thousand/µL      Basophils Absolute 0 07 Thousands/µL                  XR chest 2 views   ED Interpretation by óLpez Lennon, ELIZABETH (07/31 1046)   No acute disease                 Procedures  ECG 12 Lead Documentation Only  Date/Time: 7/31/2020 10:35 AM  Performed by: Brie Espino PA-C  Authorized by: Brie Espino PA-C     Indications / Diagnosis:  Chest pain  ECG reviewed by me, the ED Provider: yes    Patient location:  ED  Previous ECG:     Previous ECG:  Compared to current    Similarity:  No change  Interpretation:     Interpretation: normal    Rate:     ECG rate:  88    ECG rate assessment: normal    Rhythm:     Rhythm: sinus rhythm    Ectopy:     Ectopy: none    QRS:     QRS axis:  Normal    QRS intervals:  Normal  Conduction:     Conduction: normal    ST segments:     ST segments:  Normal  T waves:     T waves: normal    Comments:      QTc 425             ED Course       US AUDIT      Most Recent Value   Initial Alcohol Screen: US AUDIT-C    1  How often do you have a drink containing alcohol?  0 Filed at: 07/31/2020 1030   2  How many drinks containing alcohol do you have on a typical day you are drinking? 0 Filed at: 07/31/2020 1030   3a  Male UNDER 65: How often do you have five or more drinks on one occasion? 0 Filed at: 07/31/2020 1030   3b  FEMALE Any Age, or MALE 65+: How often do you have 4 or more drinks on one occassion? 0 Filed at: 07/31/2020 1030   Audit-C Score  0 Filed at: 07/31/2020 1030            HEART Risk Score      Most Recent Value   Heart Score Risk Calculator   History  1 Filed at: 07/31/2020 1110   ECG  0 Filed at: 07/31/2020 1110   Age  1 Filed at: 07/31/2020 1110   Risk Factors  2 Filed at: 07/31/2020 1110   Troponin  0 Filed at: 07/31/2020 1110   HEART Score  4 Filed at: 07/31/2020 1110            VAMSI/DAST-10      Most Recent Value   How many times in the past year have you    Used an illegal drug or used a prescription medication for non-medical reasons?   Never Filed at: 07/31/2020 1030                                MDM  Number of Diagnoses or Management Options  Atypical chest pain: new and requires workup  Heat exhaustion, initial encounter: new and requires workup  Diagnosis management comments: Labs and EKG reassuring  Recommend pt f/u with cardiology as outpatient       Amount and/or Complexity of Data Reviewed  Clinical lab tests: ordered and reviewed  Tests in the radiology section of CPT®: ordered and reviewed  Tests in the medicine section of CPT®: ordered and reviewed  Review and summarize past medical records: yes  Independent visualization of images, tracings, or specimens: yes          Disposition  Final diagnoses:   Atypical chest pain   Heat exhaustion, initial encounter     Time reflects when diagnosis was documented in both MDM as applicable and the Disposition within this note     Time User Action Codes Description Comment    7/31/2020 11:15 AM Ruma Cabrera [R07 89] Atypical chest pain     7/31/2020 11:15 AM Ruma Cabrera Homa Ring  5XXA] Heat exhaustion, initial encounter       ED Disposition     ED Disposition Condition Date/Time Comment    Discharge Stable Fri Jul 31, 2020 11:15 AM Braxton Venegas discharge to home/self care  Follow-up Information     Follow up With Specialties Details Why Contact Info    Ramon Gregg MD Cardiology, Multidisciplinary Schedule an appointment as soon as possible for a visit   [address] [place] [address] [phone]            Patient's Medications   Discharge Prescriptions    No medications on file     No discharge procedures on file      PDMP Review     None          ED Provider  Electronically Signed by           Hemant Rico PA-C  07/31/20 1956

## 2020-08-04 ENCOUNTER — VBI (OUTPATIENT)
Dept: FAMILY MEDICINE CLINIC | Facility: CLINIC | Age: 50
End: 2020-08-04

## 2020-08-04 DIAGNOSIS — G47.00 INSOMNIA, UNSPECIFIED TYPE: ICD-10-CM

## 2020-08-04 NOTE — TELEPHONE ENCOUNTER
Magnus Gamez    ED Visit Information     Ed visit date: 7/31/2020  Diagnosis Description:  Atypical chest pain; Heat exhaustion, initial encounter     In Network? Yes [facility]  Discharge status: Home  Discharged with meds ? No  Number of ED visits to date: 1  ED Severity:N/a     Outreach Information    Outreach successful: Yes 1  Date letter mailed:n/a  Date [address] Coordination    Follow up appointment with pcp: no No ED f/u appt scheduled but Lacy Corrales stated that they will be calling to schedule  Transportation issues ? No    Value [employer] type:  7 Day Outreach  Emergent necessity warranted by diagnosis:  No  [facility] PCP:  Yes  Transportation:  Ambulance Transport  Called PCP first?:  No  Feels able to call PCP for urgent problems ?:  Yes  Understands what emergencies can be handled by PCP ?:  Yes  Ever any problems getting appointment with PCP for minor emergency/urgency problems?:  No  Practice Contacted Patient ?:  No  Pt had ED follow up with pcp/staff ?:  No    Seen for follow-up out of network ?:  No  Reason Patient went to ED instead of Urgent Care or PCP?:  Perceived Severity of Illness  08/04/2020 03:43 PM Phone ([facility] Duty (Self) [phone] (H)   Call Complete  Personal communication with patient regarding recent ED visit on 7/31/2020 for Atypical chest pain; Heat exhaustion, initial encounter  Patient was discharged without medication and advised to follow up with cardiology  Patient stated that he is doing well and felt better after properly hydrating  Patient is already scheduled to see his PCP on 8/11/2020 for a routine follow up  Patient also stated that he is scheduled to see his neurologist  Patient does not meet OPCM criteria  Patient is aware of his nearest [facility] facility and of his PCP after hours on-call services

## 2020-08-05 RX ORDER — ZOLPIDEM TARTRATE 10 MG/1
TABLET ORAL
Qty: 30 TABLET | Refills: 5 | Status: SHIPPED | OUTPATIENT
Start: 2020-08-05 | End: 2021-02-15

## 2020-08-11 ENCOUNTER — OFFICE VISIT (OUTPATIENT)
Dept: FAMILY MEDICINE CLINIC | Facility: CLINIC | Age: 50
End: 2020-08-11
Payer: OTHER MISCELLANEOUS

## 2020-08-11 VITALS
BODY MASS INDEX: 26.34 KG/M2 | HEART RATE: 70 BPM | OXYGEN SATURATION: 96 % | TEMPERATURE: 97.3 F | WEIGHT: 184 LBS | SYSTOLIC BLOOD PRESSURE: 120 MMHG | HEIGHT: 70 IN | DIASTOLIC BLOOD PRESSURE: 80 MMHG

## 2020-08-11 DIAGNOSIS — Z86.73 HISTORY OF CVA (CEREBROVASCULAR ACCIDENT): Primary | ICD-10-CM

## 2020-08-11 DIAGNOSIS — I10 ESSENTIAL HYPERTENSION: ICD-10-CM

## 2020-08-11 DIAGNOSIS — T67.5XXD HEAT EXHAUSTION, SUBSEQUENT ENCOUNTER: ICD-10-CM

## 2020-08-11 DIAGNOSIS — Z72.0 TOBACCO ABUSE: ICD-10-CM

## 2020-08-11 PROCEDURE — 4004F PT TOBACCO SCREEN RCVD TLK: CPT | Performed by: FAMILY MEDICINE

## 2020-08-11 PROCEDURE — 3008F BODY MASS INDEX DOCD: CPT | Performed by: FAMILY MEDICINE

## 2020-08-11 PROCEDURE — 3725F SCREEN DEPRESSION PERFORMED: CPT | Performed by: FAMILY MEDICINE

## 2020-08-11 PROCEDURE — 3074F SYST BP LT 130 MM HG: CPT | Performed by: FAMILY MEDICINE

## 2020-08-11 PROCEDURE — 99213 OFFICE O/P EST LOW 20 MIN: CPT | Performed by: FAMILY MEDICINE

## 2020-08-11 PROCEDURE — 3079F DIAST BP 80-89 MM HG: CPT | Performed by: FAMILY MEDICINE

## 2020-08-11 NOTE — PROGRESS NOTES
[facility]        NAME: Ghada Gómez is a 52 y o  male  : 1970    MRN: [id number]  DATE: 2020  TIME: 3:06 PM    Assessment and Plan   History of CVA (cerebrovascular accident) [Z86 73]  1  History of CVA (cerebrovascular accident)     2  Essential hypertension     3  Tobacco abuse     4  Heat exhaustion, subsequent encounter           Patient Instructions     Patient Instructions   Pt with medical conditions that may require more freq breaks at work----pt will monitor his condition at work          Chief Complaint     Chief Complaint   Patient presents with    Follow-up     ER         History of Present Illness       F/u from ER---report rev      Review of Systems   Review of Systems   Constitutional: Negative for fatigue, fever and unexpected weight change  HENT: Negative for congestion, sinus pain and sore throat  Eyes: Negative for visual disturbance  Respiratory: Negative for shortness of breath and wheezing  Cardiovascular: Negative for chest pain and palpitations  Gastrointestinal: Negative for abdominal pain, nausea and vomiting  Musculoskeletal: Negative  Negative for arthralgias and myalgias  Neurological: Negative for syncope, weakness and numbness  Psychiatric/Behavioral: Negative  Negative for confusion, dysphoric mood and suicidal ideas           Current Medications       Current Outpatient Medications:     amLODIPine (NORVASC) 10 mg tablet, take 1 tablet by mouth once daily, Disp: 90 tablet, Rfl: 2    aspirin 81 mg chewable tablet, chew and swallow 1 tablet by mouth once daily, Disp: 100 tablet, Rfl: 0    atorvastatin (LIPITOR) 40 mg tablet, Take 1 tablet (40 mg total) by mouth daily with dinner, Disp: 90 tablet, Rfl: 0    diazepam (VALIUM) 10 mg tablet, take 1 tablet by mouth every 8 hours if needed for anxiety, Disp: 90 tablet, Rfl: 5    lisinopril (ZESTRIL) 10 mg tablet, take 1 tablet by mouth once daily, Disp: 90 tablet, Rfl: 0    oxyCODONE-acetaminophen (PERCOCET) 5-325 mg per tablet, Take 1 tablet by mouth every 4 (four) hours as needed for moderate painMax Daily Amount: 6 tablets, Disp: 120 tablet, Rfl: 0    traMADol (ULTRAM) 50 mg tablet, take 1 tablet EVERY 4 HOURS AS NEEDED FOR MODERATE PAIN, Disp: 120 tablet, Rfl: 5    zolpidem (AMBIEN) 10 mg tablet, take 1 tablet by mouth at bedtime for sleep, Disp: 30 tablet, Rfl: 5    Current Allergies     Allergies as of 08/11/2020    (No Known Allergies)            The following portions of the patient's history were reviewed and updated as appropriate: allergies, current medications, past family history, past medical history, past social history, past surgical history and problem list      Past Medical History:   Diagnosis Date    Anxiety     Chronic pain     Headache, tension-type     Hyperlipidemia     Hypertension     Stroke [facility])        Past Surgical History:   Procedure Laterality Date    HERNIA REPAIR         Family History   Problem Relation Age of Onset    Hypertension Mother     Multiple sclerosis Mother     Stroke Mother     Hypertension Father     Atrial fibrillation Father     Diabetes Family     Stroke Family     Substance Abuse Neg Hx         family history    Mental illness Neg Hx         family history         Medications have been verified  Objective   /80 (BP Location: Left arm, Patient Position: Sitting, Cuff Size: Standard)   Pulse 70   Temp (!) 97 3 °F (36 3 °C) (Temporal)   Ht 5' 10" (1 778 m)   Wt 83 5 kg (184 lb)   SpO2 96%   BMI 26 40 kg/m²        Physical Exam     Physical Exam  Constitutional:       Appearance: He is well-developed  HENT:      Right Ear: Ear canal normal  Tympanic membrane is not injected  Left Ear: Ear canal normal  Tympanic membrane is not injected  Nose: Nose normal    Eyes:      General:         Right eye: No discharge  Left eye: No discharge        Conjunctiva/sclera: Conjunctivae normal       Pupils: Pupils are equal, round, and reactive to light  Neck:      Musculoskeletal: Normal range of motion and neck supple  Thyroid: No thyromegaly  Cardiovascular:      Rate and Rhythm: Normal rate and regular rhythm  Heart sounds: Normal heart sounds  No murmur  Pulmonary:      Effort: Pulmonary effort is normal  No respiratory distress  Breath sounds: Normal breath sounds  No wheezing  Abdominal:      General: Bowel sounds are normal  There is no distension  Palpations: Abdomen is soft  Tenderness: There is no abdominal tenderness  Musculoskeletal: Normal range of motion  Lymphadenopathy:      Cervical: No cervical adenopathy  Skin:     General: Skin is warm and dry  Neurological:      Mental Status: He is alert and oriented to person, place, and time  He is not disoriented  Sensory: No sensory deficit  Gait: Gait normal       Deep Tendon Reflexes: Reflexes are normal and symmetric  Psychiatric:         Speech: Speech normal          Behavior: Behavior normal          Thought Content:  Thought content normal          Judgment: Judgment normal

## 2020-08-11 NOTE — PATIENT INSTRUCTIONS
Pt with medical conditions that may require more freq breaks at work----pt will monitor his condition at work

## 2020-08-20 DIAGNOSIS — I63.9 STROKE (CEREBRUM) (HCC): ICD-10-CM

## 2020-08-20 RX ORDER — ASPIRIN 81 MG/1
81 TABLET, CHEWABLE ORAL DAILY
Qty: 100 TABLET | Refills: 0 | Status: SHIPPED | OUTPATIENT
Start: 2020-08-20 | End: 2020-11-25

## 2020-08-27 DIAGNOSIS — G89.29 OTHER CHRONIC PAIN: ICD-10-CM

## 2020-08-27 RX ORDER — OXYCODONE HYDROCHLORIDE AND ACETAMINOPHEN 5; 325 MG/1; MG/1
1 TABLET ORAL EVERY 4 HOURS PRN
Qty: 120 TABLET | Refills: 0 | Status: SHIPPED | OUTPATIENT
Start: 2020-08-30 | End: 2020-09-24 | Stop reason: SDUPTHER

## 2020-09-04 ENCOUNTER — OFFICE VISIT (OUTPATIENT)
Dept: NEUROLOGY | Facility: CLINIC | Age: 50
End: 2020-09-04
Payer: COMMERCIAL

## 2020-09-04 VITALS
TEMPERATURE: 97.3 F | DIASTOLIC BLOOD PRESSURE: 80 MMHG | SYSTOLIC BLOOD PRESSURE: 124 MMHG | BODY MASS INDEX: 27.15 KG/M2 | WEIGHT: 189.2 LBS | HEART RATE: 73 BPM

## 2020-09-04 DIAGNOSIS — H57.12 EYE PAIN, LEFT: ICD-10-CM

## 2020-09-04 DIAGNOSIS — I10 ESSENTIAL HYPERTENSION: ICD-10-CM

## 2020-09-04 DIAGNOSIS — I63.9 CEREBROVASCULAR ACCIDENT (CVA), UNSPECIFIED MECHANISM (HCC): Primary | ICD-10-CM

## 2020-09-04 DIAGNOSIS — Z72.0 TOBACCO ABUSE: ICD-10-CM

## 2020-09-04 PROCEDURE — 99214 OFFICE O/P EST MOD 30 MIN: CPT | Performed by: PSYCHIATRY & NEUROLOGY

## 2020-09-04 NOTE — ASSESSMENT & PLAN NOTE
Pt here for neuro follow up  Pt last seen in Jan  Pt notes no new tia or cva like sxs  Exam stable  Pt remains on asa and statin  bp stable  Pt following with pcp regularly  Pt with int headaches  Pt with history of headaches since youth  Due to cva in younger age, will repeat thrombosis panel as well as sed rate  Pos temporal artery pulses mica and no temporal tenderness  Pt to call me to review labs   Pt to call for any new sxs  Pt strongly encouraged to stop tobacco

## 2020-09-04 NOTE — PROGRESS NOTES
Patient ID: Marycarmen Ma is a 52 y o  male  Assessment/Plan:    Stroke [facility])  Pt here for neuro follow up  Pt last seen in Jan  Pt notes no new tia or cva like sxs  Exam stable  Pt remains on asa and statin  bp stable  Pt following with pcp regularly  Pt with int headaches  Pt with history of headaches since youth  Due to cva in younger age, will repeat thrombosis panel as well as sed rate  Pos temporal artery pulses mica and no temporal tenderness  Pt to call me to review labs   Pt to call for any new sxs  Pt strongly encouraged to stop tobacco    Hypertension  BP stable  Pt following with pcp      Tobacco abuse  Pt educated on cva risk  Pt will speak with pcp for ways to help wean his tob use       Diagnoses and all orders for this visit:    Cerebrovascular accident (CVA), unspecified mechanism ([address] )  -     Ambulatory referral to Ophthalmology; Future  -     Sedimentation rate, automated; Future  -     Thrombosis Panel; Future    Eye pain, left  -     Ambulatory referral to Ophthalmology; Future    Essential hypertension    Tobacco abuse           Subjective:    HPI  Pt here for neuro follow up   Pt new to me  Pt previously seen by ryan rhodes and dr Reji Martell  Pt last seen by ryan on 1/17/20  Per ryan last note "Patient presented with several days of intermittent right face, arm and leg weakness, as well as intermittent double vision and HA  BP on arrival to the ED was 213/128  CTH unremarkable  CTA headache neck without significant occlusion or stenosis  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  Lipid panel: total cholesterol 161, triglycerides 109, HDL 38,   Hemoglobin A1c 5 6  It was felt that given the location of his infarction and CV risk factors, likely small vessel etiology, however given young age, other sources, particularly cardiac, need to be looked at  TTE with EF 55%, possible tiny PFO with very small right to left shunt on bubble study  SAHARA was performed after cardiology consult  This demonstrated small PFO with very small right to left shunt during provocative measures  Hypercoag panel was unremarkable  Cardiology felt that the PFO was so tiny and would make it a low risk for the cause of CVA  He was discharged on ASA, Lipitor "  Pt here for neuro follow up today  Overall pt is doing well over the past several months  Pt has not followed up with optho due to his left eye pain and dryness  Pt notes when covid hit he did not go out  Pt issues new referral today  No change in vision  No loss of vision  No diplopia  No falls or trips  No loc  No sz  No change in speech or swallowing  Pt with some residual right hand paresthesias ever since cva in April 2019  No new pathology  Pt with known headaches as well  Pt with long standing ha since youth  Pt with family history of migraines and cva in his mom  Pt notes no focal sxs with the headaches  [place] mostly in the left occipital area  No jaw claudication  No visual obscurations noted  Due to cva in young, will update thrombosis panel as well as sed rate  Pt aware to get done asap  Pt with positive temporal aretery pulses mica and no scalp or temple tenderness  Pt notes he is compliant with his asa and atorvostatin  Pt bp under fair control  Pt follows with pcp regularly  Pt also continues to smoke tobacco   Strongly encouraged pt to stop this activity  Pt notes he is trying and will contact pcp as well for any aides  Pt continues to follow with cardiology as well  Reviewed prior thrombosis panel with pt from November  The prot c activity was elevated and the final lupus anticoag results was not detected           The following portions of the patient's history were reviewed and updated as appropriate: allergies, current medications, past family history, past medical history, past social history, past surgical history and problem list and med rec and ros rev  Objective:    Blood pressure 124/80, pulse 73, temperature (!) 97 3 °F (36 3 °C), weight 85 8 kg (189 lb 3 2 oz)  Physical Exam  Eyes:      General: Lids are normal       Extraocular Movements: Extraocular movements intact  Pupils: Pupils are equal, round, and reactive to light  Neurological:      Deep Tendon Reflexes: Strength normal and reflexes are normal and symmetric  Psychiatric:         Speech: Speech normal          Neurological Exam  Mental Status  Awake, alert and oriented to person, place and time  Recent and remote memory are intact  Speech is normal  Language is fluent with no aphasia  Attention and concentration are normal  Fund of knowledge is appropriate for level of education  Cranial Nerves  CN II: Visual acuity is normal  Visual fields full to confrontation  Right funduscopic exam: disc intact  Left funduscopic exam: disc intact  CN III, IV, VI: Extraocular movements intact bilaterally  Normal lids and orbits bilaterally  Pupils equal round and reactive to light bilaterally  CN V: Facial sensation is normal   CN VII: Full and symmetric facial movement  CN VIII: Hearing is normal   CN IX, X: Palate elevates symmetrically  Normal gag reflex  CN XI: Shoulder shrug strength is normal   CN XII: Tongue midline without atrophy or fasciculations  Pos temporal artery pulses mica  No temporal tenderness  No jaw claudication  Motor  Normal muscle bulk throughout  Normal muscle tone  No abnormal involuntary movements  Strength is 5/5 throughout all four extremities  Sensory  Sensation is intact to light touch, pinprick, vibration and proprioception in all four extremities  Reflexes  Deep tendon reflexes are 2+ and symmetric in all four extremities with downgoing toes bilaterally      Coordination  Right: Finger-to-nose normal  Rapid alternating movement normal   Left: Finger-to-nose normal  Rapid alternating movement normal     Gait  Casual gait is normal including stance, stride, and arm swing  ROS:    Review of Systems   Constitutional: Negative  Negative for appetite change and fever  HENT: Negative  Negative for hearing loss, tinnitus, trouble swallowing and voice change  Eyes: Positive for visual disturbance  Negative for photophobia and pain  Dry eyes   Respiratory: Negative  Negative for shortness of breath  Cardiovascular: Negative  Negative for palpitations  Gastrointestinal: Negative  Negative for nausea and vomiting  Endocrine: Negative  Negative for cold intolerance  Genitourinary: Negative  Negative for dysuria, frequency and urgency  Musculoskeletal: Negative  Negative for myalgias and neck pain  Skin: Negative  Negative for rash  Neurological: Positive for numbness (R hand) and headaches (L sided)  Negative for dizziness, tremors, seizures, syncope, facial asymmetry, speech difficulty, weakness and light-headedness  Tingling R hand   Hematological: Negative  Does not bruise/bleed easily  Psychiatric/Behavioral: Negative  Negative for confusion, hallucinations and sleep disturbance

## 2020-09-24 DIAGNOSIS — G89.29 OTHER CHRONIC PAIN: ICD-10-CM

## 2020-09-24 RX ORDER — OXYCODONE HYDROCHLORIDE AND ACETAMINOPHEN 5; 325 MG/1; MG/1
1 TABLET ORAL EVERY 4 HOURS PRN
Qty: 120 TABLET | Refills: 0 | Status: SHIPPED | OUTPATIENT
Start: 2020-09-29 | End: 2020-10-26 | Stop reason: SDUPTHER

## 2020-10-05 ENCOUNTER — TELEPHONE (OUTPATIENT)
Dept: NEUROLOGY | Facility: CLINIC | Age: 50
End: 2020-10-05

## 2020-10-05 DIAGNOSIS — R41.82 ALTERED MENTAL STATUS, UNSPECIFIED ALTERED MENTAL STATUS TYPE: Primary | ICD-10-CM

## 2020-10-07 ENCOUNTER — OFFICE VISIT (OUTPATIENT)
Dept: FAMILY MEDICINE CLINIC | Facility: CLINIC | Age: 50
End: 2020-10-07
Payer: COMMERCIAL

## 2020-10-07 VITALS
SYSTOLIC BLOOD PRESSURE: 124 MMHG | HEART RATE: 73 BPM | OXYGEN SATURATION: 98 % | HEIGHT: 70 IN | BODY MASS INDEX: 27.06 KG/M2 | TEMPERATURE: 98.7 F | DIASTOLIC BLOOD PRESSURE: 72 MMHG | WEIGHT: 189 LBS

## 2020-10-07 DIAGNOSIS — F41.9 ANXIETY: ICD-10-CM

## 2020-10-07 DIAGNOSIS — I63.9 CEREBROVASCULAR ACCIDENT (CVA), UNSPECIFIED MECHANISM (HCC): Primary | ICD-10-CM

## 2020-10-07 DIAGNOSIS — G89.29 OTHER CHRONIC PAIN: ICD-10-CM

## 2020-10-07 DIAGNOSIS — R53.83 FATIGUE, UNSPECIFIED TYPE: ICD-10-CM

## 2020-10-07 PROCEDURE — 99214 OFFICE O/P EST MOD 30 MIN: CPT | Performed by: FAMILY MEDICINE

## 2020-10-10 LAB — TESTOST SERPL-MCNC: 370 NG/DL (ref 264–916)

## 2020-10-12 ENCOUNTER — TELEPHONE (OUTPATIENT)
Dept: FAMILY MEDICINE CLINIC | Facility: CLINIC | Age: 50
End: 2020-10-12

## 2020-10-12 ENCOUNTER — TELEPHONE (OUTPATIENT)
Dept: NEUROLOGY | Facility: CLINIC | Age: 50
End: 2020-10-12

## 2020-10-14 ENCOUNTER — OFFICE VISIT (OUTPATIENT)
Dept: CARDIOLOGY CLINIC | Facility: CLINIC | Age: 50
End: 2020-10-14
Payer: COMMERCIAL

## 2020-10-14 VITALS
TEMPERATURE: 97.9 F | SYSTOLIC BLOOD PRESSURE: 110 MMHG | WEIGHT: 185 LBS | DIASTOLIC BLOOD PRESSURE: 70 MMHG | BODY MASS INDEX: 26.48 KG/M2 | HEART RATE: 68 BPM | HEIGHT: 70 IN

## 2020-10-14 DIAGNOSIS — Z72.0 TOBACCO ABUSE: ICD-10-CM

## 2020-10-14 DIAGNOSIS — I10 ESSENTIAL (PRIMARY) HYPERTENSION: Primary | ICD-10-CM

## 2020-10-14 DIAGNOSIS — E78.00 PURE HYPERCHOLESTEROLEMIA: ICD-10-CM

## 2020-10-14 PROCEDURE — 99244 OFF/OP CNSLTJ NEW/EST MOD 40: CPT | Performed by: INTERNAL MEDICINE

## 2020-10-15 ENCOUNTER — HOSPITAL ENCOUNTER (OUTPATIENT)
Dept: MRI IMAGING | Facility: HOSPITAL | Age: 50
Discharge: HOME | End: 2020-10-15
Attending: PSYCHIATRY & NEUROLOGY
Payer: COMMERCIAL

## 2020-10-15 DIAGNOSIS — R41.82 ALTERED MENTAL STATUS, UNSPECIFIED ALTERED MENTAL STATUS TYPE: ICD-10-CM

## 2020-10-15 PROCEDURE — G1004 CDSM NDSC: HCPCS

## 2020-10-15 PROCEDURE — 70551 MRI BRAIN STEM W/O DYE: CPT

## 2020-10-16 ENCOUNTER — OFFICE VISIT (OUTPATIENT)
Dept: NEUROLOGY | Facility: CLINIC | Age: 50
End: 2020-10-16
Payer: COMMERCIAL

## 2020-10-16 VITALS
WEIGHT: 184.6 LBS | SYSTOLIC BLOOD PRESSURE: 128 MMHG | DIASTOLIC BLOOD PRESSURE: 82 MMHG | TEMPERATURE: 97.5 F | BODY MASS INDEX: 26.49 KG/M2 | HEART RATE: 76 BPM

## 2020-10-16 DIAGNOSIS — I63.9 CEREBROVASCULAR ACCIDENT (CVA), UNSPECIFIED MECHANISM (HCC): Primary | ICD-10-CM

## 2020-10-16 PROCEDURE — 99214 OFFICE O/P EST MOD 30 MIN: CPT | Performed by: PSYCHIATRY & NEUROLOGY

## 2020-10-16 PROCEDURE — 3074F SYST BP LT 130 MM HG: CPT | Performed by: PSYCHIATRY & NEUROLOGY

## 2020-10-16 PROCEDURE — 4004F PT TOBACCO SCREEN RCVD TLK: CPT | Performed by: PSYCHIATRY & NEUROLOGY

## 2020-10-16 PROCEDURE — 3079F DIAST BP 80-89 MM HG: CPT | Performed by: PSYCHIATRY & NEUROLOGY

## 2020-10-19 ENCOUNTER — TELEPHONE (OUTPATIENT)
Dept: NEUROLOGY | Facility: CLINIC | Age: 50
End: 2020-10-19

## 2020-10-20 LAB
APCR PPP: 2.7 RATIO
AT III ACT/NOR PPP CHRO: 123 %
ERYTHROCYTE [SEDIMENTATION RATE] IN BLOOD BY WESTERGREN METHOD: 6 MM/HR (ref 0–15)
LPA SERPL-SCNC: 10 MG/DL
PAI1 AG PPP IA-ACNC: <4.4 IU/ML
PROT C ACT/NOR PPP CHRO: 134 %
PROT S ACT/NOR PPP: 122 %
PROTHROM ACT/NOR PPP: 124 %

## 2020-10-26 DIAGNOSIS — G89.29 OTHER CHRONIC PAIN: ICD-10-CM

## 2020-10-26 RX ORDER — OXYCODONE HYDROCHLORIDE AND ACETAMINOPHEN 5; 325 MG/1; MG/1
1 TABLET ORAL EVERY 4 HOURS PRN
Qty: 120 TABLET | Refills: 0 | Status: SHIPPED | OUTPATIENT
Start: 2020-10-28 | End: 2020-11-24 | Stop reason: SDUPTHER

## 2020-10-28 ENCOUNTER — TELEPHONE (OUTPATIENT)
Dept: NEUROLOGY | Facility: CLINIC | Age: 50
End: 2020-10-28

## 2020-11-03 DIAGNOSIS — I16.1 HYPERTENSIVE EMERGENCY: ICD-10-CM

## 2020-11-03 RX ORDER — LISINOPRIL 10 MG/1
TABLET ORAL
Qty: 90 TABLET | Refills: 0 | Status: SHIPPED | OUTPATIENT
Start: 2020-11-03 | End: 2021-01-18

## 2020-11-24 DIAGNOSIS — G89.29 OTHER CHRONIC PAIN: ICD-10-CM

## 2020-11-24 RX ORDER — OXYCODONE HYDROCHLORIDE AND ACETAMINOPHEN 5; 325 MG/1; MG/1
1 TABLET ORAL EVERY 4 HOURS PRN
Qty: 120 TABLET | Refills: 0 | Status: SHIPPED | OUTPATIENT
Start: 2020-11-25 | End: 2020-12-22 | Stop reason: SDUPTHER

## 2020-11-25 DIAGNOSIS — I63.9 STROKE (CEREBRUM) (HCC): ICD-10-CM

## 2020-11-25 RX ORDER — ASPIRIN 81 MG/1
TABLET, CHEWABLE ORAL
Qty: 100 TABLET | Refills: 0 | Status: SHIPPED | OUTPATIENT
Start: 2020-11-25 | End: 2021-02-22

## 2020-11-27 ENCOUNTER — TELEMEDICINE (OUTPATIENT)
Dept: FAMILY MEDICINE CLINIC | Facility: CLINIC | Age: 50
End: 2020-11-27
Payer: COMMERCIAL

## 2020-11-27 DIAGNOSIS — Z20.822 ENCOUNTER BY TELEHEALTH FOR SUSPECTED COVID-19: Primary | ICD-10-CM

## 2020-11-27 DIAGNOSIS — Z20.822 ENCOUNTER BY TELEHEALTH FOR SUSPECTED COVID-19: ICD-10-CM

## 2020-11-27 PROCEDURE — U0003 INFECTIOUS AGENT DETECTION BY NUCLEIC ACID (DNA OR RNA); SEVERE ACUTE RESPIRATORY SYNDROME CORONAVIRUS 2 (SARS-COV-2) (CORONAVIRUS DISEASE [COVID-19]), AMPLIFIED PROBE TECHNIQUE, MAKING USE OF HIGH THROUGHPUT TECHNOLOGIES AS DESCRIBED BY CMS-2020-01-R: HCPCS | Performed by: FAMILY MEDICINE

## 2020-11-27 PROCEDURE — 4004F PT TOBACCO SCREEN RCVD TLK: CPT | Performed by: FAMILY MEDICINE

## 2020-11-27 PROCEDURE — 99213 OFFICE O/P EST LOW 20 MIN: CPT | Performed by: FAMILY MEDICINE

## 2020-11-28 LAB — SARS-COV-2 RNA SPEC QL NAA+PROBE: NOT DETECTED

## 2020-11-30 ENCOUNTER — TELEPHONE (OUTPATIENT)
Dept: FAMILY MEDICINE CLINIC | Facility: CLINIC | Age: 50
End: 2020-11-30

## 2020-12-22 DIAGNOSIS — G89.29 OTHER CHRONIC PAIN: ICD-10-CM

## 2020-12-23 RX ORDER — OXYCODONE HYDROCHLORIDE AND ACETAMINOPHEN 5; 325 MG/1; MG/1
1 TABLET ORAL EVERY 4 HOURS PRN
Qty: 120 TABLET | Refills: 0 | Status: SHIPPED | OUTPATIENT
Start: 2020-12-24 | End: 2021-01-21 | Stop reason: SDUPTHER

## 2020-12-30 DIAGNOSIS — S96.912S STRAIN OF LEFT FOOT, SEQUELA: ICD-10-CM

## 2020-12-30 RX ORDER — TRAMADOL HYDROCHLORIDE 50 MG/1
TABLET ORAL
Qty: 120 TABLET | Refills: 5 | Status: SHIPPED | OUTPATIENT
Start: 2020-12-30 | End: 2021-06-22

## 2021-01-08 ENCOUNTER — TELEPHONE (OUTPATIENT)
Dept: NEUROLOGY | Facility: CLINIC | Age: 51
End: 2021-01-08

## 2021-01-08 NOTE — TELEPHONE ENCOUNTER
Called pt to confirm upcoming apt in 2 weeks on 1/22/21 with Dr Yan[facility] recording states mobile customer unavailable       Asked pt if any new/worsening symptoms to report? Asked pt if they are unable to keep apt or interested in virtual apt to please call office, also advised of no show fee  Advised we will call closer to day of apt for COVID screening

## 2021-01-14 DIAGNOSIS — S96.912S STRAIN OF LEFT FOOT, SEQUELA: ICD-10-CM

## 2021-01-14 NOTE — TELEPHONE ENCOUNTER
Left message confirming that appointment with Dr Rick Scales on 1/22/21 at 8:30 am   Asking for patient to return the call the confirm that they will be keeping the appointment, and to review the [facility] screening questions and office policies  Please discuss and document

## 2021-01-16 RX ORDER — DIAZEPAM 10 MG/1
TABLET ORAL
Qty: 90 TABLET | Refills: 5 | Status: SHIPPED | OUTPATIENT
Start: 2021-01-16 | End: 2021-07-12

## 2021-01-18 DIAGNOSIS — I16.1 HYPERTENSIVE EMERGENCY: ICD-10-CM

## 2021-01-18 RX ORDER — LISINOPRIL 10 MG/1
TABLET ORAL
Qty: 90 TABLET | Refills: 0 | Status: SHIPPED | OUTPATIENT
Start: 2021-01-18 | End: 2021-04-05

## 2021-01-21 DIAGNOSIS — G89.29 OTHER CHRONIC PAIN: ICD-10-CM

## 2021-01-21 RX ORDER — OXYCODONE HYDROCHLORIDE AND ACETAMINOPHEN 5; 325 MG/1; MG/1
1 TABLET ORAL EVERY 4 HOURS PRN
Qty: 120 TABLET | Refills: 0 | Status: SHIPPED | OUTPATIENT
Start: 2021-01-24 | End: 2021-02-19 | Stop reason: SDUPTHER

## 2021-02-14 DIAGNOSIS — G47.00 INSOMNIA, UNSPECIFIED TYPE: ICD-10-CM

## 2021-02-15 RX ORDER — ZOLPIDEM TARTRATE 10 MG/1
TABLET ORAL
Qty: 30 TABLET | Refills: 5 | Status: SHIPPED | OUTPATIENT
Start: 2021-02-15 | End: 2021-08-19

## 2021-02-19 DIAGNOSIS — G89.29 OTHER CHRONIC PAIN: ICD-10-CM

## 2021-02-19 RX ORDER — OXYCODONE HYDROCHLORIDE AND ACETAMINOPHEN 5; 325 MG/1; MG/1
1 TABLET ORAL EVERY 4 HOURS PRN
Qty: 120 TABLET | Refills: 0 | Status: SHIPPED | OUTPATIENT
Start: 2021-02-23 | End: 2021-03-22 | Stop reason: SDUPTHER

## 2021-02-19 NOTE — TELEPHONE ENCOUNTER
Approve      [facility]--OXY--01/24/2021     6 MM DUE 02/2021  Voicemail left to call office to scheduled HM/MM

## 2021-02-22 DIAGNOSIS — I63.9 STROKE (CEREBRUM) (HCC): ICD-10-CM

## 2021-02-22 RX ORDER — ASPIRIN 81 MG/1
TABLET, CHEWABLE ORAL
Qty: 100 TABLET | Refills: 3 | Status: SHIPPED | OUTPATIENT
Start: 2021-02-22 | End: 2021-10-05 | Stop reason: SDUPTHER

## 2021-03-10 DIAGNOSIS — Z23 ENCOUNTER FOR IMMUNIZATION: ICD-10-CM

## 2021-03-11 DIAGNOSIS — I16.1 HYPERTENSIVE EMERGENCY: ICD-10-CM

## 2021-03-13 RX ORDER — AMLODIPINE BESYLATE 10 MG/1
TABLET ORAL
Qty: 30 TABLET | Refills: 0 | Status: SHIPPED | OUTPATIENT
Start: 2021-03-13 | End: 2021-04-08

## 2021-03-14 ENCOUNTER — IMMUNIZATIONS (OUTPATIENT)
Dept: FAMILY MEDICINE CLINIC | Facility: HOSPITAL | Age: 51
End: 2021-03-14

## 2021-03-14 DIAGNOSIS — Z23 ENCOUNTER FOR IMMUNIZATION: Primary | ICD-10-CM

## 2021-03-14 PROCEDURE — 91300 SARS-COV-2 / COVID-19 MRNA VACCINE (PFIZER-BIONTECH) 30 MCG: CPT

## 2021-03-14 PROCEDURE — 0001A SARS-COV-2 / COVID-19 MRNA VACCINE (PFIZER-BIONTECH) 30 MCG: CPT

## 2021-03-22 DIAGNOSIS — G89.29 OTHER CHRONIC PAIN: ICD-10-CM

## 2021-03-22 RX ORDER — OXYCODONE HYDROCHLORIDE AND ACETAMINOPHEN 5; 325 MG/1; MG/1
1 TABLET ORAL EVERY 4 HOURS PRN
Qty: 120 TABLET | Refills: 0 | Status: CANCELLED | OUTPATIENT
Start: 2021-03-22

## 2021-03-22 RX ORDER — OXYCODONE HYDROCHLORIDE AND ACETAMINOPHEN 5; 325 MG/1; MG/1
1 TABLET ORAL EVERY 4 HOURS PRN
Qty: 120 TABLET | Refills: 0 | Status: SHIPPED | OUTPATIENT
Start: 2021-03-24 | End: 2021-04-21 | Stop reason: SDUPTHER

## 2021-04-05 DIAGNOSIS — I16.1 HYPERTENSIVE EMERGENCY: ICD-10-CM

## 2021-04-05 RX ORDER — LISINOPRIL 10 MG/1
TABLET ORAL
Qty: 90 TABLET | Refills: 0 | Status: SHIPPED | OUTPATIENT
Start: 2021-04-05 | End: 2021-10-05 | Stop reason: SDUPTHER

## 2021-04-06 ENCOUNTER — IMMUNIZATIONS (OUTPATIENT)
Dept: FAMILY MEDICINE CLINIC | Facility: HOSPITAL | Age: 51
End: 2021-04-06

## 2021-04-06 DIAGNOSIS — Z23 ENCOUNTER FOR IMMUNIZATION: Primary | ICD-10-CM

## 2021-04-06 PROCEDURE — 91300 SARS-COV-2 / COVID-19 MRNA VACCINE (PFIZER-BIONTECH) 30 MCG: CPT

## 2021-04-06 PROCEDURE — 0002A SARS-COV-2 / COVID-19 MRNA VACCINE (PFIZER-BIONTECH) 30 MCG: CPT

## 2021-04-08 DIAGNOSIS — I16.1 HYPERTENSIVE EMERGENCY: ICD-10-CM

## 2021-04-08 RX ORDER — AMLODIPINE BESYLATE 10 MG/1
TABLET ORAL
Qty: 30 TABLET | Refills: 0 | Status: SHIPPED | OUTPATIENT
Start: 2021-04-08 | End: 2021-05-05

## 2021-04-21 ENCOUNTER — OFFICE VISIT (OUTPATIENT)
Dept: FAMILY MEDICINE CLINIC | Facility: CLINIC | Age: 51
End: 2021-04-21
Payer: COMMERCIAL

## 2021-04-21 VITALS
OXYGEN SATURATION: 98 % | HEART RATE: 96 BPM | WEIGHT: 186 LBS | HEIGHT: 70 IN | BODY MASS INDEX: 26.63 KG/M2 | DIASTOLIC BLOOD PRESSURE: 76 MMHG | SYSTOLIC BLOOD PRESSURE: 130 MMHG

## 2021-04-21 DIAGNOSIS — R41.3 MEMORY LOSS: ICD-10-CM

## 2021-04-21 DIAGNOSIS — Z00.00 WELLNESS EXAMINATION: ICD-10-CM

## 2021-04-21 DIAGNOSIS — I10 ESSENTIAL HYPERTENSION: ICD-10-CM

## 2021-04-21 DIAGNOSIS — I63.9 CEREBROVASCULAR ACCIDENT (CVA), UNSPECIFIED MECHANISM (HCC): Primary | ICD-10-CM

## 2021-04-21 DIAGNOSIS — G89.29 OTHER CHRONIC PAIN: ICD-10-CM

## 2021-04-21 DIAGNOSIS — F41.9 ANXIETY: ICD-10-CM

## 2021-04-21 PROCEDURE — 99214 OFFICE O/P EST MOD 30 MIN: CPT | Performed by: FAMILY MEDICINE

## 2021-04-21 PROCEDURE — 99396 PREV VISIT EST AGE 40-64: CPT | Performed by: FAMILY MEDICINE

## 2021-04-21 PROCEDURE — 3008F BODY MASS INDEX DOCD: CPT | Performed by: FAMILY MEDICINE

## 2021-04-21 PROCEDURE — 3078F DIAST BP <80 MM HG: CPT | Performed by: FAMILY MEDICINE

## 2021-04-21 PROCEDURE — 3075F SYST BP GE 130 - 139MM HG: CPT | Performed by: FAMILY MEDICINE

## 2021-04-21 PROCEDURE — 4004F PT TOBACCO SCREEN RCVD TLK: CPT | Performed by: FAMILY MEDICINE

## 2021-04-21 RX ORDER — OXYCODONE HYDROCHLORIDE AND ACETAMINOPHEN 5; 325 MG/1; MG/1
1 TABLET ORAL EVERY 4 HOURS PRN
Qty: 120 TABLET | Refills: 0 | Status: SHIPPED | OUTPATIENT
Start: 2021-04-21 | End: 2021-05-18 | Stop reason: SDUPTHER

## 2021-04-21 NOTE — PROGRESS NOTES
[facility]        NAME: Chris Echeverria is a 52 y o  male  : 1970    MRN: [id number]  DATE: 2021  TIME: 4:01 PM    Assessment and Plan   Cerebrovascular accident (CVA), unspecified mechanism ([facility] [address] ) [I63 9]  1  Cerebrovascular accident (CVA), unspecified mechanism ([facility] [address] )     2  Essential hypertension     3  Other chronic pain  oxyCODONE-acetaminophen (PERCOCET) 5-325 mg per tablet   4  Anxiety     5  Wellness examination     6  Memory loss  CBC and differential    Vitamin B12    TSH, 3rd generation with Free T4 reflex    T4, free    Comprehensive metabolic panel    Lipid Panel with Direct LDL reflex    C-reactive protein    CBC and differential    Vitamin B12    TSH, 3rd generation with Free T4 reflex    T4, free    Comprehensive metabolic panel    Lipid Panel with Direct LDL reflex    C-reactive protein         Patient Instructions     Patient Instructions   Same meds          Chief Complaint     Chief Complaint   Patient presents with    Follow-up     6MM    Annual Exam     HM         History of Present Illness       F/u assessed med cond      Review of Systems   Review of Systems   Constitutional: Negative for fatigue, fever and unexpected weight change  HENT: Negative for congestion, sinus pain and sore throat  Eyes: Negative for visual disturbance  Respiratory: Negative for shortness of breath and wheezing  Cardiovascular: Negative for chest pain and palpitations  Gastrointestinal: Negative for abdominal pain, nausea and vomiting  Musculoskeletal: Negative  Negative for arthralgias and myalgias  Neurological: Negative for syncope, weakness and numbness  Psychiatric/Behavioral: Negative  Negative for confusion, dysphoric mood and suicidal ideas           Current Medications       Current Outpatient Medications:     amLODIPine (NORVASC) 10 mg tablet, take 1 tablet by mouth once daily, Disp: 30 tablet, Rfl: 0    aspirin 81 mg chewable tablet, chew and swallow 1 tablet by mouth once daily, Disp: 100 tablet, Rfl: 3    diazepam (VALIUM) 10 mg tablet, take 1 tablet by mouth every 8 hours if needed for anxiety, Disp: 90 tablet, Rfl: 5    lisinopril (ZESTRIL) 10 mg tablet, take 1 tablet by mouth once daily, Disp: 90 tablet, Rfl: 0    oxyCODONE-acetaminophen (PERCOCET) 5-325 mg per tablet, Take 1 tablet by mouth every 4 (four) hours as needed for moderate painMax Daily Amount: 6 tablets, Disp: 120 tablet, Rfl: 0    traMADol (ULTRAM) 50 mg tablet, take 1 tablet EVERY 4 HOURS AS NEEDED FOR MODERATE PAIN, Disp: 120 tablet, Rfl: 5    zolpidem (AMBIEN) 10 mg tablet, take 1 tablet by mouth at bedtime for sleep, Disp: 30 tablet, Rfl: 5    Current Allergies     Allergies as of 04/21/2021    (No Known Allergies)            The following portions of the patient's history were reviewed and updated as appropriate: allergies, current medications, past family history, past medical history, past social history, past surgical history and problem list      Past Medical History:   Diagnosis Date    Anxiety     Chronic pain     Headache, tension-type     Hyperlipidemia     Hypertension     Stroke [facility])        Past Surgical History:   Procedure Laterality Date    HERNIA REPAIR         Family History   Problem Relation Age of Onset    Hypertension Mother     Multiple sclerosis Mother     Stroke Mother     Hypertension Father     Atrial fibrillation Father     Diabetes Family     Stroke Family     Substance Abuse Neg Hx         family history    Mental illness Neg Hx         family history         Medications have been verified  Objective   /76   Pulse 96   Ht 5' 10" (1 778 m)   Wt 84 4 kg (186 lb)   SpO2 98%   BMI 26 69 kg/m²        Physical Exam     Physical Exam  Constitutional:       Appearance: He is well-developed  HENT:      Right Ear: Ear canal normal  Tympanic membrane is not injected        Left Ear: Ear canal normal  Tympanic membrane is not injected  Nose: Nose normal    Eyes:      General:         Right eye: No discharge  Left eye: No discharge  Conjunctiva/sclera: Conjunctivae normal       Pupils: Pupils are equal, round, and reactive to light  Neck:      Musculoskeletal: Normal range of motion and neck supple  Thyroid: No thyromegaly  Cardiovascular:      Rate and Rhythm: Normal rate and regular rhythm  Heart sounds: Normal heart sounds  No murmur  Pulmonary:      Effort: Pulmonary effort is normal  No respiratory distress  Breath sounds: Normal breath sounds  No wheezing  Abdominal:      General: Bowel sounds are normal  There is no distension  Palpations: Abdomen is soft  Tenderness: There is no abdominal tenderness  Musculoskeletal: Normal range of motion  Lymphadenopathy:      Cervical: No cervical adenopathy  Skin:     General: Skin is warm and dry  Neurological:      Mental Status: He is alert and oriented to person, place, and time  He is not disoriented  Sensory: No sensory deficit  Gait: Gait normal       Deep Tendon Reflexes: Reflexes are normal and symmetric  Psychiatric:         Speech: Speech normal          Behavior: Behavior normal          Thought Content:  Thought content normal          Judgment: Judgment normal

## 2021-05-05 DIAGNOSIS — I16.1 HYPERTENSIVE EMERGENCY: ICD-10-CM

## 2021-05-05 RX ORDER — AMLODIPINE BESYLATE 10 MG/1
TABLET ORAL
Qty: 90 TABLET | Refills: 0 | Status: SHIPPED | OUTPATIENT
Start: 2021-05-05 | End: 2021-10-05 | Stop reason: SDUPTHER

## 2021-05-09 LAB
ALBUMIN SERPL-MCNC: 4.5 G/DL (ref 4–5)
ALBUMIN/GLOB SERPL: 1.8 {RATIO} (ref 1.2–2.2)
ALP SERPL-CCNC: 94 IU/L (ref 39–117)
ALT SERPL-CCNC: 16 IU/L (ref 0–44)
AST SERPL-CCNC: 18 IU/L (ref 0–40)
BASOPHILS # BLD AUTO: 0.1 X10E3/UL (ref 0–0.2)
BASOPHILS NFR BLD AUTO: 1 %
BILIRUB SERPL-MCNC: 0.4 MG/DL (ref 0–1.2)
BUN SERPL-MCNC: 11 MG/DL (ref 6–24)
BUN/CREAT SERPL: 12 (ref 9–20)
CALCIUM SERPL-MCNC: 9.4 MG/DL (ref 8.7–10.2)
CHLORIDE SERPL-SCNC: 104 MMOL/L (ref 96–106)
CHOLEST SERPL-MCNC: 207 MG/DL (ref 100–199)
CO2 SERPL-SCNC: 23 MMOL/L (ref 20–29)
CREAT SERPL-MCNC: 0.92 MG/DL (ref 0.76–1.27)
CRP SERPL-MCNC: <1 MG/L (ref 0–10)
EOSINOPHIL # BLD AUTO: 0.1 X10E3/UL (ref 0–0.4)
EOSINOPHIL NFR BLD AUTO: 1 %
ERYTHROCYTE [DISTWIDTH] IN BLOOD BY AUTOMATED COUNT: 13.9 % (ref 11.6–15.4)
GLOBULIN SER-MCNC: 2.5 G/DL (ref 1.5–4.5)
GLUCOSE SERPL-MCNC: 101 MG/DL (ref 65–99)
HCT VFR BLD AUTO: 47.8 % (ref 37.5–51)
HDLC SERPL-MCNC: 41 MG/DL
HGB BLD-MCNC: 16.2 G/DL (ref 13–17.7)
IMM GRANULOCYTES # BLD: 0 X10E3/UL (ref 0–0.1)
IMM GRANULOCYTES NFR BLD: 0 %
LDLC SERPL CALC-MCNC: 153 MG/DL (ref 0–99)
LDLC/HDLC SERPL: 3.7 RATIO (ref 0–3.6)
LYMPHOCYTES # BLD AUTO: 2.1 X10E3/UL (ref 0.7–3.1)
LYMPHOCYTES NFR BLD AUTO: 28 %
MCH RBC QN AUTO: 30.9 PG (ref 26.6–33)
MCHC RBC AUTO-ENTMCNC: 33.9 G/DL (ref 31.5–35.7)
MCV RBC AUTO: 91 FL (ref 79–97)
MONOCYTES # BLD AUTO: 0.5 X10E3/UL (ref 0.1–0.9)
MONOCYTES NFR BLD AUTO: 6 %
NEUTROPHILS # BLD AUTO: 4.6 X10E3/UL (ref 1.4–7)
NEUTROPHILS NFR BLD AUTO: 64 %
PLATELET # BLD AUTO: 208 X10E3/UL (ref 150–450)
POTASSIUM SERPL-SCNC: 4.7 MMOL/L (ref 3.5–5.2)
PROT SERPL-MCNC: 7 G/DL (ref 6–8.5)
RBC # BLD AUTO: 5.24 X10E6/UL (ref 4.14–5.8)
SL AMB EGFR AFRICAN AMERICAN: 113 ML/MIN/1.73
SL AMB EGFR NON AFRICAN AMERICAN: 98 ML/MIN/1.73
SL AMB VLDL CHOLESTEROL CALC: 13 MG/DL (ref 5–40)
SODIUM SERPL-SCNC: 140 MMOL/L (ref 134–144)
T4 FREE SERPL-MCNC: 1.65 NG/DL (ref 0.82–1.77)
TRIGL SERPL-MCNC: 72 MG/DL (ref 0–149)
TSH SERPL DL<=0.005 MIU/L-ACNC: 0.65 UIU/ML (ref 0.45–4.5)
VIT B12 SERPL-MCNC: 365 PG/ML (ref 232–1245)
WBC # BLD AUTO: 7.3 X10E3/UL (ref 3.4–10.8)

## 2021-05-10 ENCOUNTER — TELEPHONE (OUTPATIENT)
Dept: FAMILY MEDICINE CLINIC | Facility: CLINIC | Age: 51
End: 2021-05-10

## 2021-05-18 DIAGNOSIS — G89.29 OTHER CHRONIC PAIN: ICD-10-CM

## 2021-05-18 RX ORDER — OXYCODONE HYDROCHLORIDE AND ACETAMINOPHEN 5; 325 MG/1; MG/1
1 TABLET ORAL EVERY 4 HOURS PRN
Qty: 120 TABLET | Refills: 0 | Status: SHIPPED | OUTPATIENT
Start: 2021-05-21 | End: 2021-06-17 | Stop reason: SDUPTHER

## 2021-06-02 ENCOUNTER — OFFICE VISIT (OUTPATIENT)
Dept: NEUROLOGY | Facility: CLINIC | Age: 51
End: 2021-06-02
Payer: COMMERCIAL

## 2021-06-02 ENCOUNTER — TELEPHONE (OUTPATIENT)
Dept: NEUROLOGY | Facility: CLINIC | Age: 51
End: 2021-06-02

## 2021-06-02 VITALS
SYSTOLIC BLOOD PRESSURE: 124 MMHG | BODY MASS INDEX: 25.76 KG/M2 | WEIGHT: 179.5 LBS | DIASTOLIC BLOOD PRESSURE: 68 MMHG | HEART RATE: 82 BPM

## 2021-06-02 DIAGNOSIS — R06.83 SNORING: Primary | ICD-10-CM

## 2021-06-02 DIAGNOSIS — R41.3 AMNESIA/MEMORY DISORDER: ICD-10-CM

## 2021-06-02 DIAGNOSIS — G47.8 POOR SLEEP PATTERN: ICD-10-CM

## 2021-06-02 DIAGNOSIS — Z86.73 HISTORY OF CVA (CEREBROVASCULAR ACCIDENT): ICD-10-CM

## 2021-06-02 PROCEDURE — 99214 OFFICE O/P EST MOD 30 MIN: CPT | Performed by: PSYCHIATRY & NEUROLOGY

## 2021-06-02 PROCEDURE — 4004F PT TOBACCO SCREEN RCVD TLK: CPT | Performed by: PSYCHIATRY & NEUROLOGY

## 2021-06-02 PROCEDURE — 3074F SYST BP LT 130 MM HG: CPT | Performed by: PSYCHIATRY & NEUROLOGY

## 2021-06-02 PROCEDURE — 3078F DIAST BP <80 MM HG: CPT | Performed by: PSYCHIATRY & NEUROLOGY

## 2021-06-02 NOTE — ASSESSMENT & PLAN NOTE
Pt notes diff with sleep  Nocturnal awakenings  Pt also with snoring history  rec eval with sleep med due to poor sleep for years, eds and snoring for lai eval  lai may also be contributory to headaches as well

## 2021-06-02 NOTE — PROGRESS NOTES
Patient ID: Irving López is a 52 y o  male  Assessment/Plan:    History of CVA (cerebrovascular accident)  Pt here for neuro follow up  No new tia or cva like sxs  Exam stable  Pt notes he remains on asa but stopped lipitor awhile ago  Pt just seen in April by pcp and had lipid panel completed  Pt with elevated total chol and elevated ldl  Will reach out to pcp to see if pt needs to be back on med in light of history  Pt to call for any new sxs  Pt also reminded to quit tob    Poor sleep pattern  Pt notes diff with sleep  Nocturnal awakenings  Pt also with snoring history  rec eval with sleep med due to poor sleep for years, eds and snoring for lai eval  lai may also be contributory to headaches as well       Diagnoses and all orders for this visit:    Snoring  -     Ambulatory referral to Sleep Medicine; Future    Amnesia/memory disorder  -     Folate; Future    History of CVA (cerebrovascular accident)    Poor sleep pattern           Subjective:    HPI  Pt here for neuro follow up    Pt previously seen by ryan rhodes and dr Calderón Daily   Pt last seen by me on 10/16/20  Per my last note "Patient presented with several days of intermittent right face, arm and leg weakness, as well as intermittent double vision and HA  BP on arrival to the ED was 213/128  CTH unremarkable  CTA headache neck without significant occlusion or stenosis  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  Lipid panel: total cholesterol 161, triglycerides 109, HDL 38,   Hemoglobin A1c 5 6  It was felt that given the location of his infarction and CV risk factors, likely small vessel etiology, however given young age, other sources, particularly cardiac, need to be looked at  TTE with EF 55%, possible tiny PFO with very small right to left shunt on bubble study  SAHARA was performed after cardiology consult  This demonstrated small PFO with very small right to left shunt during provocative measures  Hypercoag panel was unremarkable  Cardiology felt that the PFO was so tiny and would make it a low risk for the cause of CVA  He was discharged on ASA, Lipitor "  Pt here for neuro follow up today  Pt last seen over 6 months ago  Pt notes currently no new sxs  No new tia or cva like sxs  Pt remains on his asa therapy  However he reports he actually has not been taking his lipitor for well over 1 5 yrs  Pt notes he was trying to simplify his med regiment  Pt did not discuss stopping of med with pcp  Will send dr Javier Farrell task as lipid panel as just repeated in April by pcp office  Pt with total chol of 207 and elevated ldlof 153  Pt is also still smoking as well  Pt notes ongoing issues with the heat at his job  Pt has been at current job over 10 yrs  rec pt to also discuss with pcp as well for present conditions related to other medical issues  Pt also with long standing issue with poor sleep  Pt notes diff with rejuvenated night of sleep  Positive eds as well as snoring history  Pt rec to see sleep med for possible lai for this poor sleep quality as well as any contribution to his int headaches  Pt had sed rate in oct and nl at 6   crp from may 5 2021 normal    Pt notes no new memory issues  No loc or sz  Pt denies any new sxs  No falls or trips  No change in bowel or bladder  No LOC, no seizure  No change in speech or swallowing  No change in vision  No loss of vision  No diplopia  No loss of vision  No headache, no nausea or vomiting  No recent hospitalizations  No recent infections  No fever or chills  No cough or sob  Again reviewed with pt best to also stop tobacco given history of cva  Pt had vit b12 done in may with level of 365  Folate still pending and re ordered  Pt had normal thrombosis panel as well from October  No cp or sob       The following portions of the patient's history were reviewed and updated as appropriate: allergies, current medications, past family history, past medical history, past social history, past surgical history and problem list and med rec and ros rev  Objective:    Blood pressure 124/68, pulse 82, weight 81 4 kg (179 lb 8 oz)  Physical Exam  Constitutional:       General: He is not in acute distress  Appearance: He is not ill-appearing  Eyes:      General: Lids are normal       Extraocular Movements: Extraocular movements intact  Pupils: Pupils are equal, round, and reactive to light  Musculoskeletal:      Right lower leg: No edema  Left lower leg: No edema  Neurological:      Mental Status: He is alert  Deep Tendon Reflexes: Strength normal and reflexes are normal and symmetric  Psychiatric:         Speech: Speech normal          Neurological Exam  Mental Status  Alert  Speech is normal  Language is fluent with no aphasia  Cranial Nerves  CN II: Visual acuity is normal  Visual fields full to confrontation  Right funduscopic exam: disc intact  Left funduscopic exam: disc intact  CN III, IV, VI: Extraocular movements intact bilaterally  Normal lids and orbits bilaterally  Pupils equal round and reactive to light bilaterally  CN V: Facial sensation is normal   CN VII: Full and symmetric facial movement  CN VIII: Hearing is normal   CN IX, X: Palate elevates symmetrically  Normal gag reflex  CN XI: Shoulder shrug strength is normal   CN XII: Tongue midline without atrophy or fasciculations  Motor  Normal muscle bulk throughout  Normal muscle tone  Strength is 5/5 throughout all four extremities  Sensory  Sensation is intact to light touch, pinprick, vibration and proprioception in all four extremities  Reflexes  Deep tendon reflexes are 2+ and symmetric in all four extremities with downgoing toes bilaterally      Coordination  Right: Finger-to-nose normal  Rapid alternating movement normal   Left: Finger-to-nose normal  Rapid alternating movement normal     Gait  Casual gait is normal including stance, stride, and arm swing  ROS:    Review of Systems   Constitutional: Positive for fatigue  Negative for appetite change and fever  HENT: Positive for tinnitus  Negative for hearing loss, trouble swallowing and voice change  Eyes: Positive for pain (L eye)  Negative for photophobia  Respiratory: Negative  Negative for shortness of breath  Cardiovascular: Negative  Negative for palpitations  Gastrointestinal: Negative  Negative for nausea and vomiting  Endocrine: Negative  Negative for cold intolerance  Genitourinary: Negative  Negative for dysuria, frequency and urgency  Musculoskeletal: Negative  Negative for myalgias and neck pain  Skin: Negative  Negative for rash  Neurological: Positive for speech difficulty (when tired), numbness (R hand) and headaches (daily and more intense)  Negative for dizziness, tremors, seizures, syncope, facial asymmetry, weakness and light-headedness  Hematological: Negative  Does not bruise/bleed easily  Psychiatric/Behavioral: Positive for sleep disturbance  Negative for confusion and hallucinations  The patient is nervous/anxious

## 2021-06-02 NOTE — ASSESSMENT & PLAN NOTE
Pt here for neuro follow up  No new tia or cva like sxs  Exam stable  Pt notes he remains on asa but stopped lipitor awhile ago  Pt just seen in April by pcp and had lipid panel completed  Pt with elevated total chol and elevated ldl  Will reach out to pcp to see if pt needs to be back on med in light of history  Pt to call for any new sxs  Pt also reminded to quit tob

## 2021-06-02 NOTE — TELEPHONE ENCOUNTER
Pt seen in office today  Please see full note  Pt mentioned that he has been off his Lipitor for at least 1 5 years  Pt self discontinued  Pt just recently had updated lipid panel for you in may  I recommended pt to follow up with you to see if any need to be back on medication

## 2021-06-17 DIAGNOSIS — G89.29 OTHER CHRONIC PAIN: ICD-10-CM

## 2021-06-18 RX ORDER — OXYCODONE HYDROCHLORIDE AND ACETAMINOPHEN 5; 325 MG/1; MG/1
1 TABLET ORAL EVERY 4 HOURS PRN
Qty: 120 TABLET | Refills: 0 | Status: SHIPPED | OUTPATIENT
Start: 2021-06-20 | End: 2021-07-15 | Stop reason: SDUPTHER

## 2021-06-22 DIAGNOSIS — S96.912S STRAIN OF LEFT FOOT, SEQUELA: ICD-10-CM

## 2021-06-22 RX ORDER — TRAMADOL HYDROCHLORIDE 50 MG/1
TABLET ORAL
Qty: 120 TABLET | Refills: 5 | Status: SHIPPED | OUTPATIENT
Start: 2021-06-22 | End: 2021-10-05 | Stop reason: SDUPTHER

## 2021-06-28 ENCOUNTER — OFFICE VISIT (OUTPATIENT)
Dept: FAMILY MEDICINE CLINIC | Facility: CLINIC | Age: 51
End: 2021-06-28
Payer: COMMERCIAL

## 2021-06-28 VITALS
SYSTOLIC BLOOD PRESSURE: 122 MMHG | WEIGHT: 180 LBS | OXYGEN SATURATION: 96 % | RESPIRATION RATE: 16 BRPM | HEART RATE: 78 BPM | HEIGHT: 70 IN | DIASTOLIC BLOOD PRESSURE: 82 MMHG | BODY MASS INDEX: 25.77 KG/M2

## 2021-06-28 DIAGNOSIS — Z72.0 TOBACCO ABUSE: ICD-10-CM

## 2021-06-28 DIAGNOSIS — F11.20 UNCOMPLICATED OPIOID DEPENDENCE (HCC): ICD-10-CM

## 2021-06-28 DIAGNOSIS — I10 ESSENTIAL HYPERTENSION: ICD-10-CM

## 2021-06-28 DIAGNOSIS — Q21.1 PFO (PATENT FORAMEN OVALE): ICD-10-CM

## 2021-06-28 DIAGNOSIS — I69.351 HEMIPLEGIA AND HEMIPARESIS FOLLOWING CEREBRAL INFARCTION AFFECTING RIGHT DOMINANT SIDE (HCC): ICD-10-CM

## 2021-06-28 DIAGNOSIS — I63.9 CEREBROVASCULAR ACCIDENT (CVA), UNSPECIFIED MECHANISM (HCC): Primary | ICD-10-CM

## 2021-06-28 DIAGNOSIS — G89.29 OTHER CHRONIC PAIN: ICD-10-CM

## 2021-06-28 PROCEDURE — 3079F DIAST BP 80-89 MM HG: CPT | Performed by: FAMILY MEDICINE

## 2021-06-28 PROCEDURE — 99214 OFFICE O/P EST MOD 30 MIN: CPT | Performed by: FAMILY MEDICINE

## 2021-06-28 PROCEDURE — 4004F PT TOBACCO SCREEN RCVD TLK: CPT | Performed by: FAMILY MEDICINE

## 2021-06-28 PROCEDURE — 3008F BODY MASS INDEX DOCD: CPT | Performed by: FAMILY MEDICINE

## 2021-06-28 PROCEDURE — 3074F SYST BP LT 130 MM HG: CPT | Performed by: FAMILY MEDICINE

## 2021-06-28 RX ORDER — ATORVASTATIN CALCIUM 10 MG/1
10 TABLET, FILM COATED ORAL DAILY
Qty: 90 TABLET | Refills: 3 | Status: SHIPPED | OUTPATIENT
Start: 2021-06-28 | End: 2021-12-01 | Stop reason: SDUPTHER

## 2021-06-28 NOTE — PROGRESS NOTES
[facility]        NAME: Mulugeta Conde is a 52 y o  male  : 1970    MRN: [id number]  DATE: 2021  TIME: 2:59 PM    Assessment and Plan   Cerebrovascular accident (CVA), unspecified mechanism ([facility] ) [I63 [id number]]  1  Cerebrovascular accident (CVA), unspecified mechanism ([facility] )  atorvastatin (LIPITOR) 10 mg tablet   2  PFO (patent foramen ovale)     3  Essential hypertension     4  Other chronic pain     5  Hemiplegia and hemiparesis following cerebral infarction affecting right dominant side ([facility] )     6  Uncomplicated opioid dependence ([facility] )     7  Tobacco abuse           Patient Instructions     There are no Patient Instructions on file for this visit  Chief Complaint     Chief Complaint   Patient presents with    Follow-up     RENEWAL         History of Present Illness       F/U ASSESSED MED COND-STABLE      Review of Systems   Review of Systems   Constitutional: Negative for fatigue, fever and unexpected weight change  HENT: Negative for congestion, sinus pain and sore throat  Eyes: Negative for visual disturbance  Respiratory: Negative for shortness of breath and wheezing  Cardiovascular: Negative for chest pain and palpitations  Gastrointestinal: Negative for abdominal pain, nausea and vomiting  Musculoskeletal: Negative  Negative for arthralgias and myalgias  Neurological: Negative for syncope, weakness and numbness  Psychiatric/Behavioral: Negative  Negative for confusion, dysphoric mood and suicidal ideas           Current Medications       Current Outpatient Medications:     amLODIPine (NORVASC) 10 mg tablet, take 1 tablet by mouth once daily, Disp: 90 tablet, Rfl: 0    aspirin 81 mg chewable tablet, chew and swallow 1 tablet by mouth once daily, Disp: 100 tablet, Rfl: 3    diazepam (VALIUM) 10 mg tablet, take 1 tablet by mouth every 8 hours if needed for anxiety, Disp: 90 tablet, Rfl: 5    lisinopril (ZESTRIL) 10 mg tablet, take 1 tablet by mouth once daily, Disp: 90 tablet, Rfl: 0    oxyCODONE-acetaminophen (PERCOCET) 5-325 mg per tablet, Take 1 tablet by mouth every 4 (four) hours as needed for moderate painMax Daily Amount: 6 tablets, Disp: 120 tablet, Rfl: 0    traMADol (ULTRAM) 50 mg tablet, take 1 tablet EVERY 4 HOURS AS NEEDED FOR MODERATE PAIN, Disp: 120 tablet, Rfl: 5    zolpidem (AMBIEN) 10 mg tablet, take 1 tablet by mouth at bedtime for sleep, Disp: 30 tablet, Rfl: 5    atorvastatin (LIPITOR) 10 mg tablet, Take 1 tablet (10 mg total) by mouth daily, Disp: 90 tablet, Rfl: 3    Current Allergies     Allergies as of 06/28/2021    (No Known Allergies)            The following portions of the patient's history were reviewed and updated as appropriate: allergies, current medications, past family history, past medical history, past social history, past surgical history and problem list      Past Medical History:   Diagnosis Date    Anxiety     Chronic pain     Headache, tension-type     Hyperlipidemia     Hypertension     Stroke [facility])        Past Surgical History:   Procedure Laterality Date    HERNIA REPAIR         Family History   Problem Relation Age of Onset    Hypertension Mother     Multiple sclerosis Mother     Stroke Mother     Hypertension Father     Atrial fibrillation Father     Diabetes Family     Stroke Family     Substance Abuse Neg Hx         family history    Mental illness Neg Hx         family history         Medications have been verified  Objective   /82   Pulse 78   Resp 16   Ht 5' 10" (1 778 m)   Wt 81 6 kg (180 lb)   SpO2 96%   BMI 25 83 kg/m²        Physical Exam     Physical Exam  Constitutional:       Appearance: He is well-developed  HENT:      Right Ear: Ear canal normal  Tympanic membrane is not injected  Left Ear: Ear canal normal  Tympanic membrane is not injected  Nose: Nose normal    Eyes:      General:         Right eye: No discharge           Left eye: No discharge  Conjunctiva/sclera: Conjunctivae normal       Pupils: Pupils are equal, round, and reactive to light  Neck:      Thyroid: No thyromegaly  Cardiovascular:      Rate and Rhythm: Normal rate and regular rhythm  Heart sounds: Normal heart sounds  No murmur heard  Pulmonary:      Effort: Pulmonary effort is normal  No respiratory distress  Breath sounds: Normal breath sounds  No wheezing  Abdominal:      General: Bowel sounds are normal  There is no distension  Palpations: Abdomen is soft  Tenderness: There is no abdominal tenderness  Musculoskeletal:         General: Normal range of motion  Cervical back: Normal range of motion and neck supple  Lymphadenopathy:      Cervical: No cervical adenopathy  Skin:     General: Skin is warm and dry  Neurological:      Mental Status: He is alert and oriented to person, place, and time  He is not disoriented  Sensory: No sensory deficit  Gait: Gait normal       Deep Tendon Reflexes: Reflexes are normal and symmetric  Psychiatric:         Speech: Speech normal          Behavior: Behavior normal          Thought Content:  Thought content normal          Judgment: Judgment normal

## 2021-07-11 DIAGNOSIS — S96.912S STRAIN OF LEFT FOOT, SEQUELA: ICD-10-CM

## 2021-07-12 RX ORDER — DIAZEPAM 10 MG/1
TABLET ORAL
Qty: 90 TABLET | Refills: 5 | Status: SHIPPED | OUTPATIENT
Start: 2021-07-12 | End: 2021-10-11 | Stop reason: SDUPTHER

## 2021-07-15 DIAGNOSIS — G89.29 OTHER CHRONIC PAIN: ICD-10-CM

## 2021-07-15 RX ORDER — OXYCODONE HYDROCHLORIDE AND ACETAMINOPHEN 5; 325 MG/1; MG/1
1 TABLET ORAL EVERY 4 HOURS PRN
Qty: 120 TABLET | Refills: 0 | Status: SHIPPED | OUTPATIENT
Start: 2021-07-15 | End: 2021-08-12 | Stop reason: SDUPTHER

## 2021-07-15 NOTE — TELEPHONE ENCOUNTER
Chart reviewed for Percocet refill    Per [facility] Last filled 6/21/21     Had MM 4/21/21 ----  6 MM due 10/2021    Rx sent to Dr Conklin Ring

## 2021-08-12 DIAGNOSIS — G89.29 OTHER CHRONIC PAIN: ICD-10-CM

## 2021-08-12 RX ORDER — OXYCODONE HYDROCHLORIDE AND ACETAMINOPHEN 5; 325 MG/1; MG/1
1 TABLET ORAL EVERY 4 HOURS PRN
Qty: 120 TABLET | Refills: 0 | Status: SHIPPED | OUTPATIENT
Start: 2021-08-12 | End: 2021-09-08 | Stop reason: SDUPTHER

## 2021-08-18 DIAGNOSIS — G47.00 INSOMNIA, UNSPECIFIED TYPE: ICD-10-CM

## 2021-08-19 RX ORDER — ZOLPIDEM TARTRATE 10 MG/1
TABLET ORAL
Qty: 30 TABLET | Refills: 5 | Status: SHIPPED | OUTPATIENT
Start: 2021-08-19 | End: 2021-12-01 | Stop reason: SDUPTHER

## 2021-09-08 DIAGNOSIS — G89.29 OTHER CHRONIC PAIN: ICD-10-CM

## 2021-09-09 RX ORDER — OXYCODONE HYDROCHLORIDE AND ACETAMINOPHEN 5; 325 MG/1; MG/1
1 TABLET ORAL EVERY 4 HOURS PRN
Qty: 120 TABLET | Refills: 0 | Status: SHIPPED | OUTPATIENT
Start: 2021-09-09 | End: 2021-10-11 | Stop reason: SDUPTHER

## 2021-10-05 DIAGNOSIS — S96.912S STRAIN OF LEFT FOOT, SEQUELA: ICD-10-CM

## 2021-10-05 DIAGNOSIS — I63.9 STROKE (CEREBRUM) (HCC): ICD-10-CM

## 2021-10-05 DIAGNOSIS — I16.1 HYPERTENSIVE EMERGENCY: ICD-10-CM

## 2021-10-06 RX ORDER — AMLODIPINE BESYLATE 10 MG/1
10 TABLET ORAL DAILY
Qty: 90 TABLET | Refills: 0 | Status: SHIPPED | OUTPATIENT
Start: 2021-10-06 | End: 2021-12-01 | Stop reason: SDUPTHER

## 2021-10-06 RX ORDER — LISINOPRIL 10 MG/1
10 TABLET ORAL DAILY
Qty: 90 TABLET | Refills: 0 | Status: SHIPPED | OUTPATIENT
Start: 2021-10-06 | End: 2021-12-01 | Stop reason: SDUPTHER

## 2021-10-06 RX ORDER — ASPIRIN 81 MG/1
81 TABLET, CHEWABLE ORAL DAILY
Qty: 100 TABLET | Refills: 0 | Status: SHIPPED | OUTPATIENT
Start: 2021-10-06 | End: 2022-04-30

## 2021-10-06 RX ORDER — TRAMADOL HYDROCHLORIDE 50 MG/1
50 TABLET ORAL EVERY 6 HOURS PRN
Qty: 120 TABLET | Refills: 1 | Status: SHIPPED | OUTPATIENT
Start: 2021-10-06 | End: 2021-11-05 | Stop reason: SDUPTHER

## 2021-10-11 DIAGNOSIS — G89.29 OTHER CHRONIC PAIN: ICD-10-CM

## 2021-10-11 RX ORDER — OXYCODONE HYDROCHLORIDE AND ACETAMINOPHEN 5; 325 MG/1; MG/1
1 TABLET ORAL EVERY 4 HOURS PRN
Qty: 120 TABLET | Refills: 0 | Status: SHIPPED | OUTPATIENT
Start: 2021-10-11 | End: 2021-11-05 | Stop reason: SDUPTHER

## 2021-11-05 DIAGNOSIS — G89.29 OTHER CHRONIC PAIN: ICD-10-CM

## 2021-11-05 RX ORDER — OXYCODONE HYDROCHLORIDE AND ACETAMINOPHEN 5; 325 MG/1; MG/1
1 TABLET ORAL EVERY 4 HOURS PRN
Qty: 120 TABLET | Refills: 0 | Status: SHIPPED | OUTPATIENT
Start: 2021-11-05 | End: 2021-12-06 | Stop reason: SDUPTHER

## 2021-12-01 ENCOUNTER — OFFICE VISIT (OUTPATIENT)
Dept: FAMILY MEDICINE CLINIC | Facility: CLINIC | Age: 51
End: 2021-12-01
Payer: COMMERCIAL

## 2021-12-01 VITALS — SYSTOLIC BLOOD PRESSURE: 136 MMHG | DIASTOLIC BLOOD PRESSURE: 88 MMHG

## 2021-12-01 DIAGNOSIS — G47.00 INSOMNIA, UNSPECIFIED TYPE: ICD-10-CM

## 2021-12-01 DIAGNOSIS — I63.9 CEREBROVASCULAR ACCIDENT (CVA), UNSPECIFIED MECHANISM (HCC): ICD-10-CM

## 2021-12-01 DIAGNOSIS — I16.1 HYPERTENSIVE EMERGENCY: ICD-10-CM

## 2021-12-01 DIAGNOSIS — S96.912S STRAIN OF LEFT FOOT, SEQUELA: ICD-10-CM

## 2021-12-01 DIAGNOSIS — I69.351 HEMIPLEGIA AND HEMIPARESIS FOLLOWING CEREBRAL INFARCTION AFFECTING RIGHT DOMINANT SIDE (HCC): ICD-10-CM

## 2021-12-01 DIAGNOSIS — I63.00 CEREBROVASCULAR ACCIDENT (CVA) DUE TO THROMBOSIS OF PRECEREBRAL ARTERY (HCC): Primary | ICD-10-CM

## 2021-12-01 DIAGNOSIS — I10 PRIMARY HYPERTENSION: ICD-10-CM

## 2021-12-01 DIAGNOSIS — Z72.0 TOBACCO ABUSE: ICD-10-CM

## 2021-12-01 DIAGNOSIS — G89.29 OTHER CHRONIC PAIN: ICD-10-CM

## 2021-12-01 PROCEDURE — 99214 OFFICE O/P EST MOD 30 MIN: CPT | Performed by: FAMILY MEDICINE

## 2021-12-01 PROCEDURE — 4004F PT TOBACCO SCREEN RCVD TLK: CPT | Performed by: FAMILY MEDICINE

## 2021-12-01 RX ORDER — AMLODIPINE BESYLATE 10 MG/1
10 TABLET ORAL DAILY
Qty: 90 TABLET | Refills: 3 | Status: SHIPPED | OUTPATIENT
Start: 2021-12-01

## 2021-12-01 RX ORDER — LISINOPRIL 10 MG/1
10 TABLET ORAL DAILY
Qty: 90 TABLET | Refills: 3 | Status: SHIPPED | OUTPATIENT
Start: 2021-12-01

## 2021-12-01 RX ORDER — ZOLPIDEM TARTRATE 10 MG/1
10 TABLET ORAL
Qty: 30 TABLET | Refills: 5 | Status: SHIPPED | OUTPATIENT
Start: 2021-12-01 | End: 2022-06-22 | Stop reason: SDUPTHER

## 2021-12-01 RX ORDER — ATORVASTATIN CALCIUM 10 MG/1
10 TABLET, FILM COATED ORAL DAILY
Qty: 90 TABLET | Refills: 3 | Status: SHIPPED | OUTPATIENT
Start: 2021-12-01

## 2021-12-06 DIAGNOSIS — G89.29 OTHER CHRONIC PAIN: ICD-10-CM

## 2021-12-06 RX ORDER — OXYCODONE HYDROCHLORIDE AND ACETAMINOPHEN 5; 325 MG/1; MG/1
1 TABLET ORAL EVERY 4 HOURS PRN
Qty: 120 TABLET | Refills: 0 | Status: SHIPPED | OUTPATIENT
Start: 2021-12-06 | End: 2022-01-04 | Stop reason: SDUPTHER

## 2022-01-04 DIAGNOSIS — G89.29 OTHER CHRONIC PAIN: ICD-10-CM

## 2022-01-04 RX ORDER — OXYCODONE HYDROCHLORIDE AND ACETAMINOPHEN 5; 325 MG/1; MG/1
1 TABLET ORAL EVERY 4 HOURS PRN
Qty: 120 TABLET | Refills: 0 | Status: SHIPPED | OUTPATIENT
Start: 2022-01-04 | End: 2022-02-03 | Stop reason: SDUPTHER

## 2022-02-03 DIAGNOSIS — G89.29 OTHER CHRONIC PAIN: ICD-10-CM

## 2022-02-03 RX ORDER — OXYCODONE HYDROCHLORIDE AND ACETAMINOPHEN 5; 325 MG/1; MG/1
1 TABLET ORAL EVERY 4 HOURS PRN
Qty: 120 TABLET | Refills: 0 | Status: SHIPPED | OUTPATIENT
Start: 2022-02-03 | End: 2022-03-03 | Stop reason: SDUPTHER

## 2022-02-05 ENCOUNTER — TELEPHONE (OUTPATIENT)
Dept: FAMILY MEDICINE CLINIC | Facility: CLINIC | Age: 52
End: 2022-02-05

## 2022-02-05 DIAGNOSIS — Z11.52 ENCOUNTER FOR SCREENING FOR COVID-19: Primary | ICD-10-CM

## 2022-02-05 NOTE — TELEPHONE ENCOUNTER
Pt request covid test-sx started on 02/04/22-with runny nose/congestion  Pt states that neighbors tested positive earlier in the week     Care now order placed-pt aware

## 2022-03-03 DIAGNOSIS — G89.29 OTHER CHRONIC PAIN: ICD-10-CM

## 2022-03-03 DIAGNOSIS — S96.912S STRAIN OF LEFT FOOT, SEQUELA: ICD-10-CM

## 2022-03-03 RX ORDER — OXYCODONE HYDROCHLORIDE AND ACETAMINOPHEN 5; 325 MG/1; MG/1
1 TABLET ORAL EVERY 4 HOURS PRN
Qty: 120 TABLET | Refills: 0 | Status: SHIPPED | OUTPATIENT
Start: 2022-03-03 | End: 2022-04-01 | Stop reason: SDUPTHER

## 2022-03-03 RX ORDER — DIAZEPAM 10 MG/1
10 TABLET ORAL EVERY 8 HOURS PRN
Qty: 90 TABLET | Refills: 0 | Status: SHIPPED | OUTPATIENT
Start: 2022-03-03 | End: 2022-04-01 | Stop reason: SDUPTHER

## 2022-04-01 DIAGNOSIS — G89.29 OTHER CHRONIC PAIN: ICD-10-CM

## 2022-04-01 RX ORDER — OXYCODONE HYDROCHLORIDE AND ACETAMINOPHEN 5; 325 MG/1; MG/1
1 TABLET ORAL EVERY 4 HOURS PRN
Qty: 120 TABLET | Refills: 0 | Status: SHIPPED | OUTPATIENT
Start: 2022-04-02 | End: 2022-04-29 | Stop reason: SDUPTHER

## 2022-04-29 DIAGNOSIS — G89.29 OTHER CHRONIC PAIN: ICD-10-CM

## 2022-04-30 DIAGNOSIS — I63.9 STROKE (CEREBRUM) (HCC): ICD-10-CM

## 2022-04-30 RX ORDER — OXYCODONE HYDROCHLORIDE AND ACETAMINOPHEN 5; 325 MG/1; MG/1
1 TABLET ORAL EVERY 4 HOURS PRN
Qty: 120 TABLET | Refills: 0 | Status: SHIPPED | OUTPATIENT
Start: 2022-05-01 | End: 2022-05-31 | Stop reason: SDUPTHER

## 2022-04-30 RX ORDER — ACETAMINOPHEN 160 MG
TABLET,CHEWABLE ORAL
Qty: 100 TABLET | Refills: 0 | Status: SHIPPED | OUTPATIENT
Start: 2022-04-30

## 2022-05-26 ENCOUNTER — TELEPHONE (OUTPATIENT)
Dept: FAMILY MEDICINE CLINIC | Facility: CLINIC | Age: 52
End: 2022-05-26

## 2022-05-26 NOTE — TELEPHONE ENCOUNTER
----- Message from Kelin Mosher sent at 5/26/2022  9:07 AM EDT -----  Regarding: Medical marijuana card renewal   How are you  This is Jonas Officer  I received an email about renewing my card   Do I need a certification number from you or do I just fill the email out     Thank you   Jonas Officer

## 2022-05-26 NOTE — TELEPHONE ENCOUNTER
Would you like him to come in for an office visit?  Last office visit 12/01/2021-  says due for PE since 4/22

## 2022-05-31 DIAGNOSIS — G89.29 OTHER CHRONIC PAIN: ICD-10-CM

## 2022-05-31 RX ORDER — OXYCODONE HYDROCHLORIDE AND ACETAMINOPHEN 5; 325 MG/1; MG/1
1 TABLET ORAL EVERY 4 HOURS PRN
Qty: 120 TABLET | Refills: 0 | Status: SHIPPED | OUTPATIENT
Start: 2022-05-31 | End: 2022-06-22 | Stop reason: SDUPTHER

## 2022-05-31 NOTE — TELEPHONE ENCOUNTER
Last fill per PDMP johny 05/01/2022    My chart message send to call office for appointment PACU then home

## 2022-06-22 ENCOUNTER — OFFICE VISIT (OUTPATIENT)
Dept: FAMILY MEDICINE CLINIC | Facility: CLINIC | Age: 52
End: 2022-06-22
Payer: COMMERCIAL

## 2022-06-22 VITALS
WEIGHT: 182 LBS | DIASTOLIC BLOOD PRESSURE: 84 MMHG | SYSTOLIC BLOOD PRESSURE: 138 MMHG | HEART RATE: 89 BPM | OXYGEN SATURATION: 96 % | TEMPERATURE: 97.8 F | BODY MASS INDEX: 26.05 KG/M2 | HEIGHT: 70 IN

## 2022-06-22 DIAGNOSIS — Z00.00 WELLNESS EXAMINATION: ICD-10-CM

## 2022-06-22 DIAGNOSIS — I10 PRIMARY HYPERTENSION: ICD-10-CM

## 2022-06-22 DIAGNOSIS — F11.20 UNCOMPLICATED OPIOID DEPENDENCE (HCC): ICD-10-CM

## 2022-06-22 DIAGNOSIS — Q21.1 PFO (PATENT FORAMEN OVALE): ICD-10-CM

## 2022-06-22 DIAGNOSIS — F11.20 CONTINUOUS OPIOID DEPENDENCE (HCC): ICD-10-CM

## 2022-06-22 DIAGNOSIS — G89.29 OTHER CHRONIC PAIN: ICD-10-CM

## 2022-06-22 DIAGNOSIS — G47.00 INSOMNIA, UNSPECIFIED TYPE: ICD-10-CM

## 2022-06-22 DIAGNOSIS — I69.351 HEMIPLEGIA AND HEMIPARESIS FOLLOWING CEREBRAL INFARCTION AFFECTING RIGHT DOMINANT SIDE (HCC): Primary | ICD-10-CM

## 2022-06-22 PROCEDURE — 4004F PT TOBACCO SCREEN RCVD TLK: CPT | Performed by: FAMILY MEDICINE

## 2022-06-22 PROCEDURE — 99214 OFFICE O/P EST MOD 30 MIN: CPT | Performed by: FAMILY MEDICINE

## 2022-06-22 PROCEDURE — 99396 PREV VISIT EST AGE 40-64: CPT | Performed by: FAMILY MEDICINE

## 2022-06-22 PROCEDURE — 3725F SCREEN DEPRESSION PERFORMED: CPT | Performed by: FAMILY MEDICINE

## 2022-06-22 PROCEDURE — 3008F BODY MASS INDEX DOCD: CPT | Performed by: FAMILY MEDICINE

## 2022-06-22 RX ORDER — OXYCODONE HYDROCHLORIDE AND ACETAMINOPHEN 5; 325 MG/1; MG/1
1 TABLET ORAL EVERY 4 HOURS PRN
Qty: 150 TABLET | Refills: 0 | Status: SHIPPED | OUTPATIENT
Start: 2022-06-22 | End: 2022-07-19 | Stop reason: SDUPTHER

## 2022-06-22 RX ORDER — ZOLPIDEM TARTRATE 10 MG/1
10 TABLET ORAL
Qty: 30 TABLET | Refills: 5 | Status: SHIPPED | OUTPATIENT
Start: 2022-06-22

## 2022-06-22 NOTE — PROGRESS NOTES
Assessment/Plan     Problem List Items Addressed This Visit        Cardiovascular and Mediastinum    Hypertension    PFO (patent foramen ovale)       Nervous and Auditory    Hemiplegia and hemiparesis following cerebral infarction affecting right dominant side (HCC) - Primary       Other    Chronic pain    Relevant Medications    oxyCODONE-acetaminophen (PERCOCET) 5-325 mg per tablet    Uncomplicated opioid dependence ([address] )      Other Visit Diagnoses     Wellness examination        Insomnia, unspecified type        Relevant Medications    zolpidem (AMBIEN) 10 mg tablet    Continuous opioid dependence (HCC)        Relevant Orders    Urine Drug Screen    Methylphenidate    Fentanyl with Confirmation    Buprenorphine w/ Naloxone    Naltrexone    Gabapentin    Pregabalin    Synthetic Stimulants    [facility] All Prescribed Medications         Opioid Management Plan      Subjective     Opioid Management HPI  HPI  Pain Medications             oxyCODONE-acetaminophen (PERCOCET) 5-325 mg per tablet Take 1 tablet by mouth every 4 (four) hours as needed for moderate pain Max Daily Amount: 6 tablets    RA Aspirin Adult Low Strength 81 MG chewable tablet chew and swallow 1 tablet by mouth once daily    traMADol (ULTRAM) 50 mg tablet take 1 tablet by mouth every 6 hours if needed for moderate pain  AVOID COMBINING WITH OXYCODONE/APA, DIAZEPAM, MAY SUPPRESS BREATHING/ENHANCE SEDATION         Outpatient Morphine Milligram Equivalents Per Day     6/22/22 and after Unknown (at least 39 MME/Day)    Order Name Dose Route Frequency Maximum MME/Day     traMADol (ULTRAM) 50 mg tablet     Unknown     oxyCODONE-acetaminophen (PERCOCET) 5-325 mg per tablet 1 tablet Oral Every 4 hours PRN 45 MME/Day    Total Potential Morphine Milligram Equivalents Per Day Unknown (at least 45 MME/Day)    An error was encountered while attempting to calculate the morphine milligram equivalents per day       Calculation Information        traMADol (ULTRAM) 50 mg tablet    Not enough information to calculate morphine milligram equivalents per day         oxyCODONE-acetaminophen (PERCOCET) 5-325 mg per tablet    oxyCODONE-acetaminophen 5-325 mg Tabs: maximum daily dose of 30 mg of opioid in combo * morphine equivalence factor of 1 5 = 45 MME/Day                         PDMP Review       Value Time User    PDMP Reviewed  Yes 3/3/2022  8:30 AM Sharona Schneider MD         Review of Systems  Objective     /84   Pulse 89   Temp 97 8 °F (36 6 °C) (Tympanic)   Ht 5' 10" (1 778 m)   Wt 82 6 kg (182 lb)   SpO2 96%   BMI 26 11 kg/m²     Physical Exam    Gladys Barreto MD

## 2022-06-22 NOTE — PATIENT INSTRUCTIONS

## 2022-06-22 NOTE — PROGRESS NOTES
HPI:  Juan M Jasso is a 54 y o  male here for his yearly health maintenance exam    Patient Active Problem List   Diagnosis    Chronic pain    History of CVA (cerebrovascular accident)    Hypertension    Tobacco abuse    PFO (patent foramen ovale)    [facility]    Anxiety    Poor sleep pattern    Hemiplegia and hemiparesis following cerebral infarction affecting right dominant side ([facility] )    Uncomplicated opioid dependence ([facility] )     Past Medical History:   Diagnosis Date    Anxiety     Chronic pain     Headache, tension-type     Hyperlipidemia     Hypertension     Stroke ([facility] )        1  Advanced Directive: n     2  Durable Power of  for Healthcare: n     3  Social History:           Drug and alcohol History: n                  4  Immunizations up to date: y                 Lifestyle:                           Healthy Diet:y                          Alcohol Use:n                          Tobacco Use:n                          Regular exercise:y                          Weight concerns:y                               5   Over the past 2 weeks, how often have you been bothered by the following:              Little interest or pleasure in doing things:y              Felling down, depressed or hopeless:y       Current Outpatient Medications   Medication Sig Dispense Refill    amLODIPine (NORVASC) 10 mg tablet Take 1 tablet (10 mg total) by mouth daily 90 tablet 3    atorvastatin (LIPITOR) 10 mg tablet Take 1 tablet (10 mg total) by mouth daily 90 tablet 3    diazepam (VALIUM) 10 mg tablet Take 1 tablet (10 mg total) by mouth every 8 (eight) hours as needed for anxiety 90 tablet 5    lisinopril (ZESTRIL) 10 mg tablet Take 1 tablet (10 mg total) by mouth daily 90 tablet 3    oxyCODONE-acetaminophen (PERCOCET) 5-325 mg per tablet Take 1 tablet by mouth every 4 (four) hours as needed for moderate pain Max Daily Amount: 6 tablets 120 tablet 0    RA Aspirin Adult Low Strength 81 MG chewable tablet chew and swallow 1 tablet by mouth once daily 100 tablet 0    traMADol (ULTRAM) 50 mg tablet take 1 tablet by mouth every 6 hours if needed for moderate pain  AVOID COMBINING WITH OXYCODONE/APA, DIAZEPAM, MAY SUPPRESS BREATHING/ENHANCE SEDATION 120 tablet 5    zolpidem (AMBIEN) 10 mg tablet Take 1 tablet (10 mg total) by mouth daily at bedtime For sleep 30 tablet 5     No current facility-administered medications for this visit  No Known Allergies  Immunization History   Administered Date(s) Administered    COVID-19 PFIZER VACCINE 0 3 ML IM 03/14/2021, 04/06/2021    INFLUENZA 08/30/2020, 09/01/2020       Patient Care Team:  Omkar John MD as PCP - General    Review of Systems   Constitutional: Negative for fatigue, fever and unexpected weight change  HENT: Negative for congestion, sinus pressure and sore throat  Eyes: Negative for visual disturbance  Respiratory: Negative for shortness of breath and wheezing  Cardiovascular: Negative for chest pain and palpitations  Gastrointestinal: Negative for abdominal pain, diarrhea, nausea and vomiting  Physical Exam :  Physical Exam  Constitutional:       Appearance: He is well-developed  HENT:      Right Ear: Ear canal normal  Tympanic membrane is not injected  Left Ear: Ear canal normal  Tympanic membrane is not injected  Nose: Nose normal    Eyes:      General:         Right eye: No discharge  Left eye: No discharge  Conjunctiva/sclera: Conjunctivae normal       Pupils: Pupils are equal, round, and reactive to light  Neck:      Thyroid: No thyromegaly  Cardiovascular:      Rate and Rhythm: Normal rate and regular rhythm  Heart sounds: Normal heart sounds  No murmur heard  Pulmonary:      Effort: Pulmonary effort is normal  No respiratory distress  Breath sounds: Normal breath sounds  No wheezing  Abdominal:      General: Bowel sounds are normal  There is no distension        Palpations: Abdomen is soft  Tenderness: There is no abdominal tenderness  Musculoskeletal:         General: Normal range of motion  Cervical back: Normal range of motion and neck supple  Lymphadenopathy:      Cervical: No cervical adenopathy  Skin:     General: Skin is warm and dry  Neurological:      Mental Status: He is alert and oriented to person, place, and time  He is not disoriented  Sensory: No sensory deficit  Gait: Gait normal       Deep Tendon Reflexes: Reflexes are normal and symmetric  Psychiatric:         Speech: Speech normal          Behavior: Behavior normal          Thought Content: Thought content normal          Judgment: Judgment normal            Assessment and Plan:  1  Hemiplegia and hemiparesis following cerebral infarction affecting right dominant side (HCC)     2  Other chronic pain     3  Primary hypertension     4  Uncomplicated opioid dependence ([facility] )     5   Wellness examination         Health Maintenance Due   Topic Date Due    Hepatitis C Screening  Never done    Pneumococcal Vaccine: Pediatrics (0 to 5 Years) and At-Risk Patients (6 to 59 Years) (1 - PCV) Never done    HIV Screening  Never done    DTaP,Tdap,and Td Vaccines (1 - Tdap) Never done    Colorectal Cancer Screening  Never done    BMI: Followup Plan  01/08/2021    Influenza Vaccine (Season Ended) 09/01/2022

## 2022-06-22 NOTE — PROGRESS NOTES
Assessment/Plan:    No problem-specific Assessment & Plan notes found for this encounter  Diagnoses and all orders for this visit:    Hemiplegia and hemiparesis following cerebral infarction affecting right dominant side ([facility] )    Other chronic pain    Primary hypertension    Uncomplicated opioid dependence ([facility] )    Wellness examination          Subjective:   Chief Complaint   Patient presents with    Follow-up     MM/ hip issues right side of hip /left eye/ right hand     Physical Exam        Patient ID: Yosef Mendoza is a 54 y o  male  HPI    The following portions of the patient's history were reviewed and updated as appropriate: allergies, current medications, past family history, past medical history, past social history, past surgical history and problem list     Review of Systems   Constitutional: Negative for fatigue, fever and unexpected weight change  HENT: Negative for congestion, sinus pain and sore throat  Eyes: Negative for visual disturbance  Respiratory: Negative for shortness of breath and wheezing  Cardiovascular: Negative for chest pain and palpitations  Gastrointestinal: Negative for abdominal pain, nausea and vomiting  Musculoskeletal: Negative  Negative for arthralgias and myalgias  Neurological: Negative for syncope, weakness and numbness  Psychiatric/Behavioral: Negative  Negative for confusion, dysphoric mood and suicidal ideas  Objective:  Vitals:    06/22/22 1541   Pulse: 89   Temp: 97 8 °F (36 6 °C)   TempSrc: Tympanic   SpO2: 96%   Weight: 82 6 kg (182 lb)   Height: 5' 10" (1 778 m)      Physical Exam  Constitutional:       Appearance: He is well-developed  HENT:      Right Ear: Ear canal normal  Tympanic membrane is not injected  Left Ear: Ear canal normal  Tympanic membrane is not injected  Nose: Nose normal    Eyes:      General:         Right eye: No discharge  Left eye: No discharge        Conjunctiva/sclera: Conjunctivae normal       Pupils: Pupils are equal, round, and reactive to light  Neck:      Thyroid: No thyromegaly  Cardiovascular:      Rate and Rhythm: Normal rate and regular rhythm  Heart sounds: Normal heart sounds  No murmur heard  Pulmonary:      Effort: Pulmonary effort is normal  No respiratory distress  Breath sounds: Normal breath sounds  No wheezing  Abdominal:      General: Bowel sounds are normal  There is no distension  Palpations: Abdomen is soft  Tenderness: There is no abdominal tenderness  Musculoskeletal:         General: Normal range of motion  Cervical back: Normal range of motion and neck supple  Lymphadenopathy:      Cervical: No cervical adenopathy  Skin:     General: Skin is warm and dry  Neurological:      Mental Status: He is alert and oriented to person, place, and time  He is not disoriented  Sensory: No sensory deficit  Gait: Gait normal       Deep Tendon Reflexes: Reflexes are normal and symmetric  Psychiatric:         Speech: Speech normal          Behavior: Behavior normal          Thought Content:  Thought content normal          Judgment: Judgment normal

## 2022-06-26 LAB
1OH-MIDAZOLAM UR-MCNC: NEGATIVE NG/ML
6-BETA NALTREXOL UR CFM-MCNC: NEGATIVE NG/ML
6MAM UR QL: NEGATIVE NG/ML
A-OH ALPRAZ UR-MCNC: NEGATIVE NG/ML
A-OH-TRIAZOLAM UR-MCNC: NEGATIVE NG/ML
AMPHETAMINES UR QL: NEGATIVE NG/ML
BARBITURATES UR QL: NEGATIVE NG/ML
BENZODIAZ UR QL: POSITIVE NG/ML
BUPRENORPHINE UR QL: NEGATIVE NG/ML
BUTYLONE: NEGATIVE NG/ML
BZE UR QL: NEGATIVE NG/ML
CREAT UR-MCNC: 142.8 MG/DL
ETHANOL UR QL: NEGATIVE NG/ML
FENTANYL: NEGATIVE NG/ML
GABAPENTIN UR-MCNC: NEGATIVE NG/ML
LORAZEPAM UR-MCNC: NEGATIVE NG/ML
MDPV: NEGATIVE NG/ML
MEPHEDRONE: NEGATIVE NG/ML
METHADONE UR QL: NEGATIVE NG/ML
METHYLONE: NEGATIVE NG/ML
NALTREXONE UR-MCNC: NEGATIVE NG/ML
NORDIAZEPAM UR-MCNC: 1318 NG/ML
OPIATES UR QL: NEGATIVE NG/ML
OXAZEPAM UR-MCNC: 4013 NG/ML
OXIDANTS UR QL: NEGATIVE MCG/ML
OXYCODONE UR QL: NEGATIVE NG/ML
PCP UR QL: NEGATIVE NG/ML
PH UR: 6.5 [PH] (ref 4.5–9)
SL AMB AMINOCLONAZEPAM: NEGATIVE NG/ML
SL AMB HYDROXYETHYLFLURAZEPAM: NEGATIVE NG/ML
SL AMB MEDMATCH MARIJUANA METABOLITE: ABNORMAL
SL AMB MEDMATCH NORDIAZEPAM: ABNORMAL
SL AMB MEDMATCH OXAZEPAM: ABNORMAL
SL AMB MEDMATCH TEMAZEPAM: ABNORMAL
SL AMB PREGABALIN: NEGATIVE NG/ML
SL AMB RITALINIC ACID: NEGATIVE NG/ML
TEMAZEPAM UR-MCNC: 3042 NG/ML
THC UR QL: POSITIVE NG/ML
THC UR-MCNC: 201 NG/ML

## 2022-07-13 ENCOUNTER — OFFICE VISIT (OUTPATIENT)
Dept: URGENT CARE | Facility: CLINIC | Age: 52
End: 2022-07-13
Payer: COMMERCIAL

## 2022-07-13 VITALS
OXYGEN SATURATION: 98 % | RESPIRATION RATE: 16 BRPM | DIASTOLIC BLOOD PRESSURE: 100 MMHG | HEART RATE: 71 BPM | TEMPERATURE: 97.6 F | SYSTOLIC BLOOD PRESSURE: 195 MMHG

## 2022-07-13 DIAGNOSIS — H60.501 ACUTE OTITIS EXTERNA OF RIGHT EAR, UNSPECIFIED TYPE: Primary | ICD-10-CM

## 2022-07-13 PROCEDURE — 99213 OFFICE O/P EST LOW 20 MIN: CPT | Performed by: PHYSICIAN ASSISTANT

## 2022-07-13 RX ORDER — NEOMYCIN SULFATE, POLYMYXIN B SULFATE AND HYDROCORTISONE 10; 3.5; 1 MG/ML; MG/ML; [USP'U]/ML
4 SUSPENSION/ DROPS AURICULAR (OTIC) 4 TIMES DAILY
Qty: 10 ML | Refills: 0 | Status: SHIPPED | OUTPATIENT
Start: 2022-07-13 | End: 2022-07-20

## 2022-07-13 NOTE — PATIENT INSTRUCTIONS
Ear drops as directed   Follow-up with PCP for blood pressure-ER if anything changes or worsens  Follow-up if no improvement

## 2022-07-13 NOTE — PROGRESS NOTES
NAME: En Dias is a 54 y o  male  : 1970    MRN: [id number]      Assessment and Plan   Acute otitis externa of right ear, unspecified type [H60 501]  1  Acute otitis externa of right ear, unspecified type  neomycin-polymyxin-hydrocortisone (CORTISPORIN) 0 35%-10,000 units/mL-1% otic suspension      patient is aware of his high blood pressure and denies any chest pain, palpitations, shortness of breath or headaches  Will follow up with his PCP and go to the ER if anything changes or worsens  Discussed likely otitis externa  Will cover with drops the muffled hearing may be from earwax  Discussed Debrox drops after finishing antibiotic drops  If no improvement follow back up  He acknowledges      Patient Instructions     Patient Instructions   Ear drops as directed   Follow-up with PCP for blood pressure-ER if anything changes or worsens  Follow-up if no improvement    Proceed to ER if symptoms worsen  Chief Complaint     Chief Complaint   Patient presents with    Earache     R earache after water slide water jet burst into ear  Muffled hearing  History of Present Illness   Patient with history of stroke, hypertension, hyperlipidemia and anxiety presents complaining of right ear pain x3 days  States he was on water slide and a water jet [place] directly in his right ear  Reports having pain shortly after that along with muffled hearing  No headaches or dizziness  No discharge or bleeding  No fevers or chills  No cough or congestion  Review of Systems   Review of Systems   Constitutional: Negative for chills and fever  HENT: Positive for ear pain and hearing loss  Negative for congestion, ear discharge and sore throat  Respiratory: Negative for cough  Neurological: Negative for dizziness, light-headedness and headaches           Current Medications       Current Outpatient Medications:     neomycin-polymyxin-hydrocortisone (CORTISPORIN) 0 35%-10,000 units/mL-1% otic suspension, Administer 4 drops to the right ear 4 (four) times a day for 7 days, Disp: 10 mL, Rfl: 0    amLODIPine (NORVASC) 10 mg tablet, Take 1 tablet (10 mg total) by mouth daily, Disp: 90 tablet, Rfl: 3    atorvastatin (LIPITOR) 10 mg tablet, Take 1 tablet (10 mg total) by mouth daily, Disp: 90 tablet, Rfl: 3    diazepam (VALIUM) 10 mg tablet, Take 1 tablet (10 mg total) by mouth every 8 (eight) hours as needed for anxiety, Disp: 90 tablet, Rfl: 5    lisinopril (ZESTRIL) 10 mg tablet, Take 1 tablet (10 mg total) by mouth daily, Disp: 90 tablet, Rfl: 3    oxyCODONE-acetaminophen (PERCOCET) 5-325 mg per tablet, Take 1 tablet by mouth every 4 (four) hours as needed for moderate pain Max Daily Amount: 6 tablets, Disp: 150 tablet, Rfl: 0    RA Aspirin Adult Low Strength 81 MG chewable tablet, chew and swallow 1 tablet by mouth once daily, Disp: 100 tablet, Rfl: 0    traMADol (ULTRAM) 50 mg tablet, take 1 tablet by mouth every 6 hours if needed for moderate pain  AVOID COMBINING WITH OXYCODONE/APA, DIAZEPAM, MAY SUPPRESS BREATHING/ENHANCE SEDATION, Disp: 120 tablet, Rfl: 5    zolpidem (AMBIEN) 10 mg tablet, Take 1 tablet (10 mg total) by mouth daily at bedtime For sleep, Disp: 30 tablet, Rfl: 5    Current Allergies     Allergies as of 07/13/2022    (No Known Allergies)              Past Medical History:   Diagnosis Date    Anxiety     Chronic pain     Headache, tension-type     Hyperlipidemia     Hypertension     Stroke [facility])        Past Surgical History:   Procedure Laterality Date    HERNIA REPAIR         Family History   Problem Relation Age of Onset    Hypertension Mother     Multiple sclerosis Mother     Stroke Mother     Hypertension Father     Atrial fibrillation Father     Diabetes Family     Stroke Family     Substance Abuse Neg Hx         family history    Mental illness Neg Hx         family history         Medications have been verified      The following portions of the patient's history were reviewed and updated as appropriate: allergies, current medications, past family history, past medical history, past social history, past surgical history and problem list     Objective   BP (!) 195/100   Pulse 71   Temp 97 6 °F (36 4 °C)   Resp 16   SpO2 98%      Physical Exam     Physical Exam  Vitals and nursing note reviewed  Constitutional:       General: He is not in acute distress  Appearance: Normal appearance  He is not ill-appearing, toxic-appearing or diaphoretic  HENT:      Head:      Comments: Right TM not visible due to cerumen  Pain with manipulation of the tragus  Canal is mildly erythematous and edematous  Left TM is clear without erythema or bulging  Canal patent without erythema or edema  Cardiovascular:      Rate and Rhythm: Normal rate and regular rhythm  Pulmonary:      Effort: Pulmonary effort is normal  No respiratory distress  Musculoskeletal:      Cervical back: Normal range of motion  No rigidity  Lymphadenopathy:      Cervical: No cervical adenopathy  Skin:     Capillary Refill: Capillary refill takes less than 2 seconds  Neurological:      Mental Status: He is alert

## 2022-07-14 ENCOUNTER — HOSPITAL ENCOUNTER (EMERGENCY)
Facility: HOSPITAL | Age: 52
Discharge: HOME | End: 2022-07-14
Attending: EMERGENCY MEDICINE
Payer: COMMERCIAL

## 2022-07-14 VITALS
RESPIRATION RATE: 18 BRPM | DIASTOLIC BLOOD PRESSURE: 104 MMHG | TEMPERATURE: 97.7 F | HEART RATE: 87 BPM | HEIGHT: 70 IN | WEIGHT: 182 LBS | BODY MASS INDEX: 26.05 KG/M2 | SYSTOLIC BLOOD PRESSURE: 150 MMHG | OXYGEN SATURATION: 99 %

## 2022-07-14 DIAGNOSIS — H61.21 IMPACTED CERUMEN OF RIGHT EAR: Primary | ICD-10-CM

## 2022-07-14 DIAGNOSIS — H60.90 OTITIS EXTERNA: ICD-10-CM

## 2022-07-14 PROCEDURE — 99283 EMERGENCY DEPT VISIT LOW MDM: CPT

## 2022-07-14 PROCEDURE — 99284 EMERGENCY DEPT VISIT MOD MDM: CPT | Performed by: PHYSICIAN ASSISTANT

## 2022-07-14 NOTE — ED NOTES
Pt now reports he was not sent form urgent care, he was seen there yesterday and they ordered antibiotics and ear drops for him that he has not picked up from the pharmacy  Pt came here today on his own         Jazmyne Bailey RN  07/14/22 2496

## 2022-07-14 NOTE — ED PROVIDER NOTES
History  Chief Complaint   Patient presents with    Ear Problem     PT reports that he was going down a water slide at [facility] Sunday and a water jet squirted into his right ear, pain continues, was sent to ER from urgent care  Unable to hear on right side  Patient is a 37year-old male that presents to the ED with right ear pain and muffled hearing times 4 days  He states he was at [facility] and went down a water slide and was hit in the right ear with water  He went to urgent care last night and they prescribed him antibiotic ear drops for otitis externa  He did not fill this prescription but is more concerned that he can not hear out of his right ear  He denies fevers chills nasal congestion or sinus pressure  History provided by:  Patient  Earache  Location:  Right  Behind ear:  No abnormality  Quality:  Aching  Severity:  Mild  Onset quality:  Gradual  Duration:  4 days  Timing:  Constant  Progression:  Unchanged  Chronicity:  New  Context: water in ear    Relieved by:  Nothing  Worsened by:  Nothing  Ineffective treatments:  None tried  Associated symptoms: hearing loss    Associated symptoms: no congestion, no cough, no diarrhea, no ear discharge, no fever, no headaches, no rash, no rhinorrhea, no sore throat and no tinnitus        Prior to Admission Medications   Prescriptions Last Dose Informant Patient Reported? Taking?    RA Aspirin Adult Low Strength 81 MG chewable tablet   No No   Sig: chew and swallow 1 tablet by mouth once daily   amLODIPine (NORVASC) 10 mg tablet   No No   Sig: Take 1 tablet (10 mg total) by mouth daily   atorvastatin (LIPITOR) 10 mg tablet   No No   Sig: Take 1 tablet (10 mg total) by mouth daily   diazepam (VALIUM) 10 mg tablet   No No   Sig: Take 1 tablet (10 mg total) by mouth every 8 (eight) hours as needed for anxiety   lisinopril (ZESTRIL) 10 mg tablet   No No   Sig: Take 1 tablet (10 mg total) by mouth daily   neomycin-polymyxin-hydrocortisone (CORTISPORIN) 0 35%-10,000 units/mL-1% otic suspension   No No   Sig: Administer 4 drops to the right ear 4 (four) times a day for 7 days   oxyCODONE-acetaminophen (PERCOCET) 5-325 mg per tablet   No No   Sig: Take 1 tablet by mouth every 4 (four) hours as needed for moderate pain Max Daily Amount: 6 tablets   traMADol (ULTRAM) 50 mg tablet   No No   Sig: take 1 tablet by mouth every 6 hours if needed for moderate pain  AVOID COMBINING WITH OXYCODONE/APA, DIAZEPAM, MAY SUPPRESS BREATHING/ENHANCE SEDATION   zolpidem (AMBIEN) 10 mg tablet   No No   Sig: Take 1 tablet (10 mg total) by mouth daily at bedtime For sleep      Facility-Administered Medications: None       Past Medical History:   Diagnosis Date    Anxiety     Chronic pain     Headache, tension-type     Hyperlipidemia     Hypertension     Stroke [facility])        Past Surgical History:   Procedure Laterality Date    HERNIA REPAIR         Family History   Problem Relation Age of Onset    Hypertension Mother     Multiple sclerosis Mother     Stroke Mother     Hypertension Father     Atrial fibrillation Father     Diabetes Family     Stroke Family     Substance Abuse Neg Hx         family history    Mental illness Neg Hx         family history     I have reviewed and agree with the history as documented  E-Cigarette/Vaping    E-Cigarette Use Never User      E-Cigarette/Vaping Substances    Nicotine No     THC No     CBD No     Flavoring No     Other No     Unknown No      Social History     Tobacco Use    Smoking status: Current Every Day Smoker     Packs/day: 1 00     Years: 30 00     Pack years: 30 00     Types: Cigarettes    Smokeless tobacco: Never Used   Vaping Use    Vaping Use: Never used   Substance Use Topics    Alcohol use: No    Drug use: No       Review of Systems   Constitutional: Negative for chills and fever  HENT: Positive for ear pain and hearing loss   Negative for congestion, ear discharge, facial swelling, rhinorrhea, sore throat and tinnitus  Respiratory: Negative for cough and shortness of breath  Cardiovascular: Negative for chest pain and leg swelling  Gastrointestinal: Negative for diarrhea  Skin: Negative for color change, pallor, rash and wound  Neurological: Positive for dizziness  Negative for weakness, light-headedness, numbness and headaches  Psychiatric/Behavioral: Negative for confusion  All other systems reviewed and are negative  Physical Exam  Physical Exam  Vitals and nursing note reviewed  Constitutional:       General: He is not in acute distress  Appearance: Normal appearance  He is well-developed and well-groomed  He is not ill-appearing or diaphoretic  HENT:      Head: Normocephalic and atraumatic  Jaw: There is normal jaw occlusion  No tenderness, swelling or pain on movement  Right Ear: Decreased hearing noted  Swelling (mild swelling of external canal, mild erythema, no purulent discharge  ) present  There is impacted cerumen  No mastoid tenderness  Left Ear: Hearing, tympanic membrane, ear canal and external ear normal       Nose: Nose normal       Mouth/Throat:      Mouth: Mucous membranes are moist       Pharynx: Oropharynx is clear  Eyes:      Conjunctiva/sclera: Conjunctivae normal       Pupils: Pupils are equal    Cardiovascular:      Rate and Rhythm: Normal rate and regular rhythm  Heart sounds: Normal heart sounds  Pulmonary:      Effort: Pulmonary effort is normal       Breath sounds: Normal breath sounds  No wheezing, rhonchi or rales  Musculoskeletal:         General: Normal range of motion  Cervical back: Normal range of motion and neck supple  Right lower leg: No edema  Left lower leg: No edema  Skin:     General: Skin is warm and dry  Coloration: Skin is not jaundiced or pale  Findings: No rash  Neurological:      General: No focal deficit present        Mental Status: He is alert and oriented to person, place, and time  Motor: No weakness  Psychiatric:         Mood and Affect: Mood normal          Behavior: Behavior is cooperative  Vital Signs  ED Triage Vitals [07/14/22 1014]   Temperature Pulse Respirations Blood Pressure SpO2   97 7 °F (36 5 °C) 91 16 (!) 170/101 99 %      Temp Source Heart Rate Source Patient Position - Orthostatic VS BP Location FiO2 (%)   Temporal Monitor Sitting Left arm --      Pain Score       9           Vitals:    07/14/22 1014 07/14/22 1023   BP: (!) 170/101 (!) 150/104   Pulse: 91 87   Patient Position - Orthostatic VS: Sitting          Visual Acuity      ED Medications  Medications - No data to display    Diagnostic Studies  Results Reviewed     None                 No orders to display              Procedures  Ear cerumen removal    Date/Time: 7/14/2022 11:15 AM  Performed by: Ara Su PA-C  Authorized by: Ara Su PA-C   Universal Protocol:  Patient identity confirmed: verbally with patient      Patient location:  ED  Procedure details:     Local anesthetic:  None    Location:  R ear    Procedure type: irrigation only      Approach:  External    Visualization (free text):  Otoscope    Equipment used:  Syringe, water, hydrogen peroxide  Post-procedure details:     Complication:  None    Hearing quality:  Improved    Patient tolerance of procedure: Tolerated well, no immediate complications  Comments:      TM visible after irrigation, no perforation, mild erythema, not bulging  ED Course                               SBIRT 20yo+    Flowsheet Row Most Recent Value   SBIRT (25 yo +)    In order to provide better care to our patients, we are screening all of our patients for alcohol and drug use  Would it be okay to ask you these screening questions? Yes Filed at: 07/14/2022 1025   Initial Alcohol Screen: US AUDIT-C     1  How often do you have a drink containing alcohol? 0 Filed at: 07/14/2022 1025   2   How many drinks containing alcohol do you have on a typical day you are drinking? 0 Filed at: 07/14/2022 1025   3a  Male UNDER 65: How often do you have five or more drinks on one occasion? 0 Filed at: 07/14/2022 1025   3b  FEMALE Any Age, or MALE 65+: How often do you have 4 or more drinks on one occassion? 0 Filed at: 07/14/2022 1025   Audit-C Score 0 Filed at: 07/14/2022 1025   VAMSI: How many times in the past year have you    Used an illegal drug or used a prescription medication for non-medical reasons? Never Filed at: 07/14/2022 1025                    MDM  Number of Diagnoses or Management Options  Impacted cerumen of right ear: new and does not require workup  Otitis externa: new and does not require workup  Diagnosis management comments: Patient with right ear pain and decreased hearing, has cerumen impaction and mild swelling of canal, will irrigate to remove cerumen  Patient given abx drops for otitis externa yesterday, instructed him to start these  Patient Progress  Patient progress: stable      Disposition  Final diagnoses:   Impacted cerumen of right ear   Otitis externa - right ear     Time reflects when diagnosis was documented in both MDM as applicable and the Disposition within this note     Time User Action Codes Description Comment    7/14/2022 11:32 AM Fco Jung Add [H61 21] Impacted cerumen of right ear     7/14/2022 11:32 AM Fco Jung Add [H60 90] Otitis externa     7/14/2022 11:32 AM Fco Jung Modify [H60 90] Otitis externa right ear      ED Disposition     ED Disposition   Discharge    Condition   Stable    Date/Time   Thu Jul 14, 2022 11:31 AM    Comment   Mulugeta Conde discharge to home/self care                 Follow-up Information     Follow up With Specialties Details Why Celeste Llamas MD Otolaryngology Schedule an appointment as soon as possible for a visit  As needed, For recheck [address] [facility] [address] Discharge Medication List as of 7/14/2022 11:32 AM      CONTINUE these medications which have NOT CHANGED    Details   amLODIPine (NORVASC) 10 mg tablet Take 1 tablet (10 mg total) by mouth daily, Starting Wed 12/1/2021, Normal      atorvastatin (LIPITOR) 10 mg tablet Take 1 tablet (10 mg total) by mouth daily, Starting Wed 12/1/2021, Normal      diazepam (VALIUM) 10 mg tablet Take 1 tablet (10 mg total) by mouth every 8 (eight) hours as needed for anxiety, Starting Wed 4/6/2022, Normal      lisinopril (ZESTRIL) 10 mg tablet Take 1 tablet (10 mg total) by mouth daily, Starting Wed 12/1/2021, Normal      neomycin-polymyxin-hydrocortisone (CORTISPORIN) 0 35%-10,000 units/mL-1% otic suspension Administer 4 drops to the right ear 4 (four) times a day for 7 days, Starting Wed 7/13/2022, Until Wed 7/20/2022, Normal      oxyCODONE-acetaminophen (PERCOCET) 5-325 mg per tablet Take 1 tablet by mouth every 4 (four) hours as needed for moderate pain Max Daily Amount: 6 tablets, Starting Wed 6/22/2022, Normal      RA Aspirin Adult Low Strength 81 MG chewable tablet chew and swallow 1 tablet by mouth once daily, Normal      traMADol (ULTRAM) 50 mg tablet take 1 tablet by mouth every 6 hours if needed for moderate pain  AVOID COMBINING WITH OXYCODONE/APA, DIAZEPAM, MAY SUPPRESS BREATHING/ENHANCE SEDATION, Normal      zolpidem (AMBIEN) 10 mg tablet Take 1 tablet (10 mg total) by mouth daily at bedtime For sleep, Starting Wed 6/22/2022, Normal             No discharge procedures on file      PDMP Review       Value Time User    PDMP Reviewed  Yes 3/3/2022  8:30 AM Marian Vigil MD          ED Provider  Electronically Signed by           Alva Bishop PA-C  07/14/22 1978

## 2022-07-19 DIAGNOSIS — G89.29 OTHER CHRONIC PAIN: ICD-10-CM

## 2022-07-19 RX ORDER — OXYCODONE HYDROCHLORIDE AND ACETAMINOPHEN 5; 325 MG/1; MG/1
1 TABLET ORAL EVERY 4 HOURS PRN
Qty: 150 TABLET | Refills: 0 | Status: SHIPPED | OUTPATIENT
Start: 2022-07-19 | End: 2022-08-16 | Stop reason: SDUPTHER

## 2022-07-25 ENCOUNTER — TELEPHONE (OUTPATIENT)
Dept: FAMILY MEDICINE CLINIC | Facility: CLINIC | Age: 52
End: 2022-07-25

## 2022-07-25 NOTE — TELEPHONE ENCOUNTER
Pt's wife is aware of TW's advice and confirmed understanding  Pt's wife stated that they will call the office for the RTW note once the pt feels better (+5 days and asymptomatic)

## 2022-07-25 NOTE — TELEPHONE ENCOUNTER
Patient calling to informed Home covid test positive today  Symptoms are fatigue, sweats, chills, sore throat , headaches  symptoms start last night per patient  Looking for advise  Does he need virtual visit? Call out of work today when to return ? ?     [facility]  [phone]

## 2022-08-16 DIAGNOSIS — G89.29 OTHER CHRONIC PAIN: ICD-10-CM

## 2022-08-17 RX ORDER — OXYCODONE HYDROCHLORIDE AND ACETAMINOPHEN 5; 325 MG/1; MG/1
1 TABLET ORAL EVERY 4 HOURS PRN
Qty: 150 TABLET | Refills: 0 | Status: SHIPPED | OUTPATIENT
Start: 2022-08-17 | End: 2022-09-13 | Stop reason: SDUPTHER

## 2022-09-13 ENCOUNTER — OFFICE VISIT (OUTPATIENT)
Dept: FAMILY MEDICINE CLINIC | Facility: CLINIC | Age: 52
End: 2022-09-13
Payer: COMMERCIAL

## 2022-09-13 VITALS
BODY MASS INDEX: 26.05 KG/M2 | SYSTOLIC BLOOD PRESSURE: 138 MMHG | RESPIRATION RATE: 16 BRPM | WEIGHT: 182 LBS | HEART RATE: 88 BPM | OXYGEN SATURATION: 99 % | DIASTOLIC BLOOD PRESSURE: 88 MMHG | HEIGHT: 70 IN

## 2022-09-13 DIAGNOSIS — G89.29 OTHER CHRONIC PAIN: ICD-10-CM

## 2022-09-13 DIAGNOSIS — M70.51 PES ANSERINUS BURSITIS OF RIGHT KNEE: Primary | ICD-10-CM

## 2022-09-13 DIAGNOSIS — I16.1 HYPERTENSIVE EMERGENCY: ICD-10-CM

## 2022-09-13 DIAGNOSIS — Z12.11 SCREEN FOR COLON CANCER: ICD-10-CM

## 2022-09-13 PROCEDURE — 99214 OFFICE O/P EST MOD 30 MIN: CPT | Performed by: FAMILY MEDICINE

## 2022-09-13 PROCEDURE — 20610 DRAIN/INJ JOINT/BURSA W/O US: CPT | Performed by: FAMILY MEDICINE

## 2022-09-13 RX ORDER — LIDOCAINE HYDROCHLORIDE 10 MG/ML
2 INJECTION, SOLUTION INFILTRATION; PERINEURAL
Status: COMPLETED | OUTPATIENT
Start: 2022-09-13 | End: 2022-09-13

## 2022-09-13 RX ORDER — AMLODIPINE BESYLATE 10 MG/1
10 TABLET ORAL DAILY
Qty: 90 TABLET | Refills: 3 | Status: SHIPPED | OUTPATIENT
Start: 2022-09-13

## 2022-09-13 RX ORDER — DEXAMETHASONE SODIUM PHOSPHATE 100 MG/10ML
40 INJECTION INTRAMUSCULAR; INTRAVENOUS
Status: COMPLETED | OUTPATIENT
Start: 2022-09-13 | End: 2022-09-13

## 2022-09-13 RX ORDER — LISINOPRIL 20 MG/1
10 TABLET ORAL DAILY
Qty: 90 TABLET | Refills: 3 | Status: SHIPPED | OUTPATIENT
Start: 2022-09-13

## 2022-09-13 RX ORDER — OXYCODONE HYDROCHLORIDE AND ACETAMINOPHEN 5; 325 MG/1; MG/1
1 TABLET ORAL EVERY 4 HOURS PRN
Qty: 150 TABLET | Refills: 0 | Status: SHIPPED | OUTPATIENT
Start: 2022-09-13 | End: 2022-10-10 | Stop reason: SDUPTHER

## 2022-09-13 RX ADMIN — DEXAMETHASONE SODIUM PHOSPHATE 40 MG: 100 INJECTION INTRAMUSCULAR; INTRAVENOUS at 13:45

## 2022-09-13 RX ADMIN — LIDOCAINE HYDROCHLORIDE 2 ML: 10 INJECTION, SOLUTION INFILTRATION; PERINEURAL at 13:45

## 2022-09-13 NOTE — PROGRESS NOTES
Large joint arthrocentesis: R anserine bursa  Universal Protocol:  Consent given by: patient    Supporting Documentation  Indications: pain   Procedure Details  Location: knee - R anserine bursa  Needle size: 22 G  Ultrasound guidance: no  Approach: posterolateral  Medications administered: 40 mg dexamethasone 100 mg/10 mL; 2 mL lidocaine 1 %    Patient tolerance: patient tolerated the procedure well with no immediate complications  Dressing:  Sterile dressing applied

## 2022-09-13 NOTE — PROGRESS NOTES
Assessment/Plan:    No problem-specific Assessment & Plan notes found for this encounter  Diagnoses and all orders for this visit:    Pes anserinus bursitis of right knee          Subjective:   No chief complaint on file  Patient ID: Troy Tirado is a 50 y o  male  HPI    The following portions of the patient's history were reviewed and updated as appropriate: allergies, current medications, past family history, past medical history, past social history, past surgical history and problem list     Review of Systems   Constitutional: Negative for fatigue, fever and unexpected weight change  HENT: Negative for congestion, sinus pain and sore throat  Eyes: Negative for visual disturbance  Respiratory: Negative for shortness of breath and wheezing  Cardiovascular: Negative for chest pain and palpitations  Gastrointestinal: Negative for abdominal pain, nausea and vomiting  Musculoskeletal: Negative  Negative for arthralgias and myalgias  Neurological: Negative for syncope, weakness and numbness  Psychiatric/Behavioral: Negative  Negative for confusion, dysphoric mood and suicidal ideas  Objective: There were no vitals filed for this visit  Physical Exam  Constitutional:       Appearance: He is well-developed  HENT:      Right Ear: Ear canal normal  Tympanic membrane is not injected  Left Ear: Ear canal normal  Tympanic membrane is not injected  Nose: Nose normal    Eyes:      General:         Right eye: No discharge  Left eye: No discharge  Conjunctiva/sclera: Conjunctivae normal       Pupils: Pupils are equal, round, and reactive to light  Neck:      Thyroid: No thyromegaly  Cardiovascular:      Rate and Rhythm: Normal rate and regular rhythm  Heart sounds: Normal heart sounds  No murmur heard  Pulmonary:      Effort: Pulmonary effort is normal  No respiratory distress  Breath sounds: Normal breath sounds  No wheezing  Abdominal:      General: Bowel sounds are normal  There is no distension  Palpations: Abdomen is soft  Tenderness: There is no abdominal tenderness  Musculoskeletal:         General: Normal range of motion  Cervical back: Normal range of motion and neck supple  Lymphadenopathy:      Cervical: No cervical adenopathy  Skin:     General: Skin is warm and dry  Neurological:      Mental Status: He is alert and oriented to person, place, and time  He is not disoriented  Sensory: No sensory deficit  Gait: Gait normal       Deep Tendon Reflexes: Reflexes are normal and symmetric  Psychiatric:         Speech: Speech normal          Behavior: Behavior normal          Thought Content:  Thought content normal          Judgment: Judgment normal        c/w R pes bursitis

## 2022-09-27 LAB — COLOGUARD RESULT REPORTABLE: NEGATIVE

## 2022-10-10 DIAGNOSIS — G89.29 OTHER CHRONIC PAIN: ICD-10-CM

## 2022-10-11 RX ORDER — OXYCODONE HYDROCHLORIDE AND ACETAMINOPHEN 5; 325 MG/1; MG/1
1 TABLET ORAL EVERY 4 HOURS PRN
Qty: 150 TABLET | Refills: 0 | Status: SHIPPED | OUTPATIENT
Start: 2022-10-11

## 2022-11-09 DIAGNOSIS — G89.29 OTHER CHRONIC PAIN: ICD-10-CM

## 2022-11-10 RX ORDER — OXYCODONE HYDROCHLORIDE AND ACETAMINOPHEN 5; 325 MG/1; MG/1
1 TABLET ORAL EVERY 4 HOURS PRN
Qty: 150 TABLET | Refills: 0 | Status: SHIPPED | OUTPATIENT
Start: 2022-11-10

## 2022-11-29 DIAGNOSIS — I63.9 STROKE (CEREBRUM) (HCC): ICD-10-CM

## 2022-11-29 RX ORDER — ACETAMINOPHEN 160 MG
TABLET,CHEWABLE ORAL
Qty: 70 TABLET | Refills: 0 | Status: SHIPPED | OUTPATIENT
Start: 2022-11-29

## 2022-12-08 DIAGNOSIS — G89.29 OTHER CHRONIC PAIN: ICD-10-CM

## 2022-12-08 RX ORDER — OXYCODONE HYDROCHLORIDE AND ACETAMINOPHEN 5; 325 MG/1; MG/1
1 TABLET ORAL EVERY 4 HOURS PRN
Qty: 150 TABLET | Refills: 0 | Status: SHIPPED | OUTPATIENT
Start: 2022-12-08

## 2022-12-28 DIAGNOSIS — I63.9 STROKE (CEREBRUM) (HCC): ICD-10-CM

## 2022-12-28 RX ORDER — ASPIRIN 81 MG/1
TABLET, CHEWABLE ORAL
Qty: 70 TABLET | Refills: 0 | Status: SHIPPED | OUTPATIENT
Start: 2022-12-28

## 2023-01-09 ENCOUNTER — TELEPHONE (OUTPATIENT)
Dept: FAMILY MEDICINE CLINIC | Facility: CLINIC | Age: 53
End: 2023-01-09

## 2023-01-09 DIAGNOSIS — G89.29 OTHER CHRONIC PAIN: ICD-10-CM

## 2023-01-09 RX ORDER — OXYCODONE HYDROCHLORIDE AND ACETAMINOPHEN 5; 325 MG/1; MG/1
1 TABLET ORAL EVERY 4 HOURS PRN
Qty: 150 TABLET | Refills: 0 | Status: SHIPPED | OUTPATIENT
Start: 2023-01-09

## 2023-01-24 ENCOUNTER — OFFICE VISIT (OUTPATIENT)
Dept: FAMILY MEDICINE CLINIC | Facility: CLINIC | Age: 53
End: 2023-01-24

## 2023-01-24 VITALS
BODY MASS INDEX: 26.69 KG/M2 | SYSTOLIC BLOOD PRESSURE: 135 MMHG | DIASTOLIC BLOOD PRESSURE: 87 MMHG | TEMPERATURE: 98 F | OXYGEN SATURATION: 97 % | WEIGHT: 186 LBS | HEART RATE: 76 BPM

## 2023-01-24 DIAGNOSIS — I69.351 HEMIPLEGIA AND HEMIPARESIS FOLLOWING CEREBRAL INFARCTION AFFECTING RIGHT DOMINANT SIDE (HCC): ICD-10-CM

## 2023-01-24 DIAGNOSIS — Z86.73 HISTORY OF CVA (CEREBROVASCULAR ACCIDENT): ICD-10-CM

## 2023-01-24 DIAGNOSIS — F11.20 CONTINUOUS OPIOID DEPENDENCE (HCC): ICD-10-CM

## 2023-01-24 DIAGNOSIS — F11.20 UNCOMPLICATED OPIOID DEPENDENCE (HCC): Primary | ICD-10-CM

## 2023-01-24 NOTE — PROGRESS NOTES
Name: Pauline Allen      : 1970      MRN: [id number]  Encounter Provider: Lawrence Villalpando MD  Encounter Date: 2023   Encounter department: [facility]     1  Uncomplicated opioid dependence ([address] )    2  History of CVA (cerebrovascular accident)    3  Continuous opioid dependence (HCC)           Subjective      HPI  Review of Systems   Constitutional: Negative for fatigue, fever and unexpected weight change  HENT: Negative for congestion, sinus pain and sore throat  Eyes: Negative for visual disturbance  Respiratory: Negative for shortness of breath and wheezing  Cardiovascular: Negative for chest pain and palpitations  Gastrointestinal: Negative for abdominal pain, nausea and vomiting  Musculoskeletal: Negative  Negative for arthralgias and myalgias  Neurological: Negative for syncope, weakness and numbness  Psychiatric/Behavioral: Negative  Negative for confusion, dysphoric mood and suicidal ideas         Current Outpatient Medications on File Prior to Visit   Medication Sig   • amLODIPine (NORVASC) 10 mg tablet Take 1 tablet (10 mg total) by mouth daily   • aspirin 81 mg chewable tablet chew and swallow 1 tablet by mouth once daily   • atorvastatin (LIPITOR) 10 mg tablet Take 1 tablet (10 mg total) by mouth daily   • diazepam (VALIUM) 10 mg tablet take 1 tablet by mouth EVERY 8 HOURS AS NEEDED   • lisinopril (ZESTRIL) 20 mg tablet Take 0 5 tablets (10 mg total) by mouth daily   • oxyCODONE-acetaminophen (PERCOCET) 5-325 mg per tablet Take 1 tablet by mouth every 4 (four) hours as needed for moderate pain Max Daily Amount: 6 tablets   • traMADol (ULTRAM) 50 mg tablet take 1 tablet by mouth every 6 hours if needed for moderate pain  AVOID COMBINING WITH OXYCODONE/APA, DIAZEPAM, MAY SUPPRESS BREATHING/ENHANCE SEDATION   • zolpidem (AMBIEN) 10 mg tablet take 1 tablet by mouth once daily at bedtime for sleep   • neomycin-polymyxin-hydrocortisone (CORTISPORIN) 0 35%-10,000 units/mL-1% otic suspension Administer 4 drops to the right ear 4 (four) times a day for 7 days       Objective     /87 (BP Location: Left arm, Patient Position: Sitting, Cuff Size: Standard)   Pulse 76   Temp 98 °F (36 7 °C) (Tympanic)   Wt 84 4 kg (186 lb)   SpO2 97%   BMI 26 69 kg/m²     Physical Exam  Constitutional:       Appearance: He is well-developed  HENT:      Right Ear: Ear canal normal  Tympanic membrane is not injected  Left Ear: Ear canal normal  Tympanic membrane is not injected  Nose: Nose normal    Eyes:      General:         Right eye: No discharge  Left eye: No discharge  Conjunctiva/sclera: Conjunctivae normal       Pupils: Pupils are equal, round, and reactive to light  Neck:      Thyroid: No thyromegaly  Cardiovascular:      Rate and Rhythm: Normal rate and regular rhythm  Heart sounds: Normal heart sounds  No murmur heard  Pulmonary:      Effort: Pulmonary effort is normal  No respiratory distress  Breath sounds: Normal breath sounds  No wheezing  Abdominal:      General: Bowel sounds are normal  There is no distension  Palpations: Abdomen is soft  Tenderness: There is no abdominal tenderness  Musculoskeletal:         General: Normal range of motion  Cervical back: Normal range of motion and neck supple  Lymphadenopathy:      Cervical: No cervical adenopathy  Skin:     General: Skin is warm and dry  Neurological:      Mental Status: He is alert and oriented to person, place, and time  He is not disoriented  Sensory: No sensory deficit  Gait: Gait normal       Deep Tendon Reflexes: Reflexes are normal and symmetric  Psychiatric:         Speech: Speech normal          Behavior: Behavior normal          Thought Content:  Thought content normal          Judgment: Judgment normal        Bassam Brizuela MD

## 2023-01-24 NOTE — PATIENT INSTRUCTIONS

## 2023-02-06 DIAGNOSIS — G89.29 OTHER CHRONIC PAIN: ICD-10-CM

## 2023-02-06 RX ORDER — OXYCODONE HYDROCHLORIDE AND ACETAMINOPHEN 5; 325 MG/1; MG/1
1 TABLET ORAL EVERY 4 HOURS PRN
Qty: 150 TABLET | Refills: 0 | Status: SHIPPED | OUTPATIENT
Start: 2023-02-06

## 2023-02-15 ENCOUNTER — TELEPHONE (OUTPATIENT)
Dept: FAMILY MEDICINE CLINIC | Facility: CLINIC | Age: 53
End: 2023-02-15

## 2023-03-06 DIAGNOSIS — G89.29 OTHER CHRONIC PAIN: ICD-10-CM

## 2023-03-07 RX ORDER — OXYCODONE HYDROCHLORIDE AND ACETAMINOPHEN 5; 325 MG/1; MG/1
1 TABLET ORAL EVERY 4 HOURS PRN
Qty: 150 TABLET | Refills: 0 | Status: SHIPPED | OUTPATIENT
Start: 2023-03-07

## 2023-03-13 ENCOUNTER — OFFICE VISIT (OUTPATIENT)
Dept: FAMILY MEDICINE CLINIC | Facility: CLINIC | Age: 53
End: 2023-03-13

## 2023-03-13 VITALS
OXYGEN SATURATION: 99 % | HEIGHT: 70 IN | WEIGHT: 190 LBS | TEMPERATURE: 98.8 F | RESPIRATION RATE: 18 BRPM | HEART RATE: 70 BPM | BODY MASS INDEX: 27.2 KG/M2

## 2023-03-13 DIAGNOSIS — E78.2 MIXED HYPERLIPIDEMIA: ICD-10-CM

## 2023-03-13 DIAGNOSIS — Z12.5 SCREENING FOR PROSTATE CANCER: ICD-10-CM

## 2023-03-13 DIAGNOSIS — I63.00 CEREBROVASCULAR ACCIDENT (CVA) DUE TO THROMBOSIS OF PRECEREBRAL ARTERY (HCC): ICD-10-CM

## 2023-03-13 DIAGNOSIS — G89.21 CHRONIC PAIN DUE TO TRAUMA: ICD-10-CM

## 2023-03-13 DIAGNOSIS — I10 PRIMARY HYPERTENSION: ICD-10-CM

## 2023-03-13 DIAGNOSIS — I10 PRIMARY HYPERTENSION: Primary | ICD-10-CM

## 2023-03-13 DIAGNOSIS — F11.20 CONTINUOUS OPIOID DEPENDENCE (HCC): ICD-10-CM

## 2023-03-13 DIAGNOSIS — F11.20 UNCOMPLICATED OPIOID DEPENDENCE (HCC): Primary | ICD-10-CM

## 2023-03-13 NOTE — PROGRESS NOTES
Assessment/Plan:    Uncomplicated opioid dependence (HCC)  PT STABLE ON CURRENT DOSE--low abuse risk--long of use--will Rx 3 mos while pt looks for other options       Diagnoses and all orders for this visit:    Uncomplicated opioid dependence ([facility] )    Cerebrovascular accident (CVA) due to thrombosis of precerebral artery ([facility] [address] )    Primary hypertension    Chronic pain due to trauma    Mixed hyperlipidemia  -     Comprehensive metabolic panel; Future  -     Lipid Panel with Direct LDL reflex; Future    Screening for prostate cancer  -     PSA Total (Reflex To Free); Future          Subjective:   Chief Complaint   Patient presents with   • Medication Management        Patient ID: Robert Reece is a 50 y o  male  HPI    The following portions of the patient's history were reviewed and updated as appropriate: allergies, current medications, past family history, past medical history, past social history, past surgical history and problem list     Review of Systems   Constitutional: Negative for fatigue, fever and unexpected weight change  HENT: Negative for congestion, sinus pain and sore throat  Eyes: Negative for visual disturbance  Respiratory: Negative for shortness of breath and wheezing  Cardiovascular: Negative for chest pain and palpitations  Gastrointestinal: Negative for abdominal pain, nausea and vomiting  Musculoskeletal: Negative  Negative for arthralgias and myalgias  Neurological: Negative for syncope, weakness and numbness  Psychiatric/Behavioral: Negative  Negative for confusion, dysphoric mood and suicidal ideas  Objective:  Vitals:    03/13/23 1334   Pulse: 70   Resp: 18   Temp: 98 8 °F (37 1 °C)   TempSrc: Tympanic   SpO2: 99%   Weight: 86 2 kg (190 lb)   Height: 5' 10" (1 778 m)      Physical Exam  Constitutional:       Appearance: He is well-developed  HENT:      Right Ear: Ear canal normal  Tympanic membrane is not injected        Left Ear: Ear canal normal  Tympanic membrane is not injected  Nose: Nose normal    Eyes:      General:         Right eye: No discharge  Left eye: No discharge  Conjunctiva/sclera: Conjunctivae normal       Pupils: Pupils are equal, round, and reactive to light  Neck:      Thyroid: No thyromegaly  Cardiovascular:      Rate and Rhythm: Normal rate and regular rhythm  Heart sounds: Normal heart sounds  No murmur heard  Pulmonary:      Effort: Pulmonary effort is normal  No respiratory distress  Breath sounds: Normal breath sounds  No wheezing  Abdominal:      General: Bowel sounds are normal  There is no distension  Palpations: Abdomen is soft  Tenderness: There is no abdominal tenderness  Musculoskeletal:         General: Normal range of motion  Cervical back: Normal range of motion and neck supple  Lymphadenopathy:      Cervical: No cervical adenopathy  Skin:     General: Skin is warm and dry  Neurological:      Mental Status: He is alert and oriented to person, place, and time  He is not disoriented  Sensory: No sensory deficit  Gait: Gait normal       Deep Tendon Reflexes: Reflexes are normal and symmetric  Psychiatric:         Speech: Speech normal          Behavior: Behavior normal          Thought Content:  Thought content normal          Judgment: Judgment normal

## 2023-03-13 NOTE — ASSESSMENT & PLAN NOTE
PT STABLE ON CURRENT DOSE--low abuse risk--long of use--will Rx 3 mos while pt looks for other options

## 2023-03-13 NOTE — PATIENT INSTRUCTIONS

## 2023-03-25 LAB
ALBUMIN SERPL-MCNC: 4.5 G/DL (ref 4–5)
ALBUMIN/GLOB SERPL: 2.3 {RATIO} (ref 1.2–2.2)
ALP SERPL-CCNC: 94 IU/L (ref 44–121)
ALT SERPL-CCNC: 14 IU/L (ref 0–44)
AST SERPL-CCNC: 19 IU/L (ref 0–40)
BILIRUB SERPL-MCNC: 0.5 MG/DL (ref 0–1.2)
BUN SERPL-MCNC: 13 MG/DL (ref 6–24)
BUN/CREAT SERPL: 12 (ref 9–20)
CALCIUM SERPL-MCNC: 9.1 MG/DL (ref 8.7–10.2)
CHLORIDE SERPL-SCNC: 104 MMOL/L (ref 96–106)
CHOLEST SERPL-MCNC: 127 MG/DL (ref 100–199)
CO2 SERPL-SCNC: 25 MMOL/L (ref 20–29)
CREAT SERPL-MCNC: 1.11 MG/DL (ref 0.76–1.27)
EGFR: 81 ML/MIN/1.73
GLOBULIN SER-MCNC: 2 G/DL (ref 1.5–4.5)
GLUCOSE SERPL-MCNC: 88 MG/DL (ref 70–99)
HDLC SERPL-MCNC: 37 MG/DL
LDLC SERPL CALC-MCNC: 75 MG/DL (ref 0–99)
LDLC/HDLC SERPL: 2 RATIO (ref 0–3.6)
POTASSIUM SERPL-SCNC: 4.7 MMOL/L (ref 3.5–5.2)
PROT SERPL-MCNC: 6.5 G/DL (ref 6–8.5)
PSA SERPL-MCNC: 0.5 NG/ML (ref 0–4)
SL AMB REFLEX CRITERIA: NORMAL
SL AMB VLDL CHOLESTEROL CALC: 15 MG/DL (ref 5–40)
SODIUM SERPL-SCNC: 140 MMOL/L (ref 134–144)
TRIGL SERPL-MCNC: 75 MG/DL (ref 0–149)

## 2023-03-27 ENCOUNTER — TELEPHONE (OUTPATIENT)
Dept: FAMILY MEDICINE CLINIC | Facility: CLINIC | Age: 53
End: 2023-03-27

## 2023-03-27 NOTE — TELEPHONE ENCOUNTER
lvm for pt to call back for results    ----- Message from Samm Day MD sent at 3/25/2023  8:02 AM EDT -----  Labs good--repeat 1 yr  ----- Message -----  From: [facility] Results In  Sent: 3/25/2023   6:07 AM EDT  To: Samm Day MD

## 2023-04-05 DIAGNOSIS — G89.29 OTHER CHRONIC PAIN: ICD-10-CM

## 2023-04-07 RX ORDER — OXYCODONE HYDROCHLORIDE AND ACETAMINOPHEN 5; 325 MG/1; MG/1
1 TABLET ORAL EVERY 4 HOURS PRN
Qty: 150 TABLET | Refills: 0 | Status: SHIPPED | OUTPATIENT
Start: 2023-04-07

## 2023-05-05 DIAGNOSIS — G89.29 OTHER CHRONIC PAIN: ICD-10-CM

## 2023-05-05 RX ORDER — OXYCODONE HYDROCHLORIDE AND ACETAMINOPHEN 5; 325 MG/1; MG/1
1 TABLET ORAL EVERY 4 HOURS PRN
Qty: 150 TABLET | Refills: 0 | Status: SHIPPED | OUTPATIENT
Start: 2023-05-05

## 2023-06-04 DIAGNOSIS — G89.29 OTHER CHRONIC PAIN: ICD-10-CM

## 2023-06-05 RX ORDER — OXYCODONE HYDROCHLORIDE AND ACETAMINOPHEN 5; 325 MG/1; MG/1
1 TABLET ORAL EVERY 4 HOURS PRN
Qty: 150 TABLET | Refills: 0 | Status: SHIPPED | OUTPATIENT
Start: 2023-06-05

## 2023-06-07 ENCOUNTER — OFFICE VISIT (OUTPATIENT)
Dept: FAMILY MEDICINE CLINIC | Facility: CLINIC | Age: 53
End: 2023-06-07
Payer: COMMERCIAL

## 2023-06-07 VITALS
SYSTOLIC BLOOD PRESSURE: 122 MMHG | BODY MASS INDEX: 25.77 KG/M2 | OXYGEN SATURATION: 99 % | HEIGHT: 70 IN | HEART RATE: 72 BPM | DIASTOLIC BLOOD PRESSURE: 76 MMHG | TEMPERATURE: 97.6 F | WEIGHT: 180 LBS

## 2023-06-07 DIAGNOSIS — F41.9 ANXIETY: ICD-10-CM

## 2023-06-07 DIAGNOSIS — Q21.12 PFO (PATENT FORAMEN OVALE): Primary | ICD-10-CM

## 2023-06-07 DIAGNOSIS — I69.351 HEMIPLEGIA AND HEMIPARESIS FOLLOWING CEREBRAL INFARCTION AFFECTING RIGHT DOMINANT SIDE (HCC): ICD-10-CM

## 2023-06-07 PROCEDURE — 99214 OFFICE O/P EST MOD 30 MIN: CPT | Performed by: FAMILY MEDICINE

## 2023-06-07 NOTE — PROGRESS NOTES
"Assessment/Plan:    No problem-specific Assessment & Plan notes found for this encounter  Diagnoses and all orders for this visit:    PFO (patent foramen ovale)    Hemiplegia and hemiparesis following cerebral infarction affecting right dominant side [facility])    Anxiety          Subjective:   Chief Complaint   Patient presents with   • med check        Patient ID: Lizandro Mcarthur is a 50 y o  male  HPI    The following portions of the patient's history were reviewed and updated as appropriate: allergies, current medications, past family history, past medical history, past social history, past surgical history and problem list     Review of Systems   Constitutional: Negative for fatigue, fever and unexpected weight change  HENT: Negative for congestion, sinus pain and sore throat  Eyes: Negative for visual disturbance  Respiratory: Negative for shortness of breath and wheezing  Cardiovascular: Negative for chest pain and palpitations  Gastrointestinal: Negative for abdominal pain, nausea and vomiting  Musculoskeletal: Negative  Negative for arthralgias and myalgias  Neurological: Negative for syncope, weakness and numbness  Psychiatric/Behavioral: Negative  Negative for confusion, dysphoric mood and suicidal ideas  Objective:  Vitals:    06/07/23 1521   BP: 122/76   BP Location: Left arm   Patient Position: Sitting   Cuff Size: Standard   Pulse: 72   Temp: 97 6 °F (36 4 °C)   TempSrc: Tympanic   SpO2: 99%   Weight: 81 6 kg (180 lb)   Height: 5' 10\" (1 778 m)      Physical Exam  Constitutional:       Appearance: He is well-developed  HENT:      Right Ear: Ear canal normal  Tympanic membrane is not injected  Left Ear: Ear canal normal  Tympanic membrane is not injected  Nose: Nose normal    Eyes:      General:         Right eye: No discharge  Left eye: No discharge        Conjunctiva/sclera: Conjunctivae normal       Pupils: Pupils are equal, round, and " reactive to light  Neck:      Thyroid: No thyromegaly  Cardiovascular:      Rate and Rhythm: Normal rate and regular rhythm  Heart sounds: Normal heart sounds  No murmur heard  Pulmonary:      Effort: Pulmonary effort is normal  No respiratory distress  Breath sounds: Normal breath sounds  No wheezing  Abdominal:      General: Bowel sounds are normal  There is no distension  Palpations: Abdomen is soft  Tenderness: There is no abdominal tenderness  Musculoskeletal:         General: Normal range of motion  Cervical back: Normal range of motion and neck supple  Lymphadenopathy:      Cervical: No cervical adenopathy  Skin:     General: Skin is warm and dry  Neurological:      Mental Status: He is alert and oriented to person, place, and time  He is not disoriented  Sensory: No sensory deficit  Gait: Gait normal       Deep Tendon Reflexes: Reflexes are normal and symmetric  Psychiatric:         Speech: Speech normal          Behavior: Behavior normal          Thought Content:  Thought content normal          Judgment: Judgment normal

## 2023-07-27 DIAGNOSIS — G47.00 INSOMNIA, UNSPECIFIED TYPE: ICD-10-CM

## 2023-07-28 RX ORDER — ZOLPIDEM TARTRATE 10 MG/1
TABLET ORAL
Qty: 30 TABLET | Refills: 5 | Status: SHIPPED | OUTPATIENT
Start: 2023-07-28

## 2023-09-15 DIAGNOSIS — I16.1 HYPERTENSIVE EMERGENCY: ICD-10-CM

## 2023-09-15 RX ORDER — AMLODIPINE BESYLATE 10 MG/1
10 TABLET ORAL DAILY
Qty: 90 TABLET | Refills: 3 | Status: SHIPPED | OUTPATIENT
Start: 2023-09-15

## 2023-10-17 DIAGNOSIS — S96.912S STRAIN OF LEFT FOOT, SEQUELA: ICD-10-CM

## 2023-10-17 NOTE — TELEPHONE ENCOUNTER
Patient's pharmacy informed the patient that there are no refills on this medication and that the office needs to call a refill for him. [facility] [place] [phone].

## 2023-10-18 RX ORDER — TRAMADOL HYDROCHLORIDE 50 MG/1
50 TABLET ORAL EVERY 6 HOURS PRN
Qty: 120 TABLET | Refills: 5 | Status: SHIPPED | OUTPATIENT
Start: 2023-10-18

## 2023-10-18 RX ORDER — DIAZEPAM 10 MG/1
10 TABLET ORAL EVERY 8 HOURS PRN
Qty: 90 TABLET | Refills: 5 | Status: SHIPPED | OUTPATIENT
Start: 2023-10-18

## 2023-10-26 ENCOUNTER — OFFICE VISIT (OUTPATIENT)
Dept: FAMILY MEDICINE CLINIC | Facility: CLINIC | Age: 53
End: 2023-10-26
Payer: COMMERCIAL

## 2023-10-26 VITALS
DIASTOLIC BLOOD PRESSURE: 82 MMHG | OXYGEN SATURATION: 99 % | WEIGHT: 182.4 LBS | BODY MASS INDEX: 26.11 KG/M2 | SYSTOLIC BLOOD PRESSURE: 130 MMHG | HEIGHT: 70 IN | HEART RATE: 72 BPM | TEMPERATURE: 97.1 F

## 2023-10-26 DIAGNOSIS — G47.00 INSOMNIA, UNSPECIFIED TYPE: ICD-10-CM

## 2023-10-26 DIAGNOSIS — I10 PRIMARY HYPERTENSION: ICD-10-CM

## 2023-10-26 DIAGNOSIS — S96.912S STRAIN OF LEFT FOOT, SEQUELA: ICD-10-CM

## 2023-10-26 DIAGNOSIS — I63.00 CEREBROVASCULAR ACCIDENT (CVA) DUE TO THROMBOSIS OF PRECEREBRAL ARTERY (HCC): ICD-10-CM

## 2023-10-26 DIAGNOSIS — I63.9 CEREBROVASCULAR ACCIDENT (CVA), UNSPECIFIED MECHANISM (HCC): ICD-10-CM

## 2023-10-26 DIAGNOSIS — Z00.00 WELLNESS EXAMINATION: ICD-10-CM

## 2023-10-26 DIAGNOSIS — Q21.12 PFO (PATENT FORAMEN OVALE): ICD-10-CM

## 2023-10-26 DIAGNOSIS — Z12.5 SCREENING FOR PROSTATE CANCER: ICD-10-CM

## 2023-10-26 DIAGNOSIS — I16.1 HYPERTENSIVE EMERGENCY: ICD-10-CM

## 2023-10-26 DIAGNOSIS — I69.351 HEMIPLEGIA AND HEMIPARESIS FOLLOWING CEREBRAL INFARCTION AFFECTING RIGHT DOMINANT SIDE (HCC): ICD-10-CM

## 2023-10-26 DIAGNOSIS — S39.012A STRAIN OF LUMBAR REGION, INITIAL ENCOUNTER: ICD-10-CM

## 2023-10-26 DIAGNOSIS — Z23 ENCOUNTER FOR IMMUNIZATION: Primary | ICD-10-CM

## 2023-10-26 PROCEDURE — 99214 OFFICE O/P EST MOD 30 MIN: CPT | Performed by: FAMILY MEDICINE

## 2023-10-26 PROCEDURE — 99396 PREV VISIT EST AGE 40-64: CPT | Performed by: FAMILY MEDICINE

## 2023-10-26 RX ORDER — ATORVASTATIN CALCIUM 10 MG/1
10 TABLET, FILM COATED ORAL DAILY
Qty: 90 TABLET | Refills: 3 | Status: SHIPPED | OUTPATIENT
Start: 2023-10-26

## 2023-10-26 RX ORDER — TRAMADOL HYDROCHLORIDE 50 MG/1
50 TABLET ORAL EVERY 6 HOURS PRN
Qty: 120 TABLET | Refills: 5 | Status: SHIPPED | OUTPATIENT
Start: 2023-10-26

## 2023-10-26 RX ORDER — OXYCODONE HYDROCHLORIDE AND ACETAMINOPHEN 5; 325 MG/1; MG/1
1 TABLET ORAL EVERY 4 HOURS PRN
Qty: 30 TABLET | Refills: 0 | Status: SHIPPED | OUTPATIENT
Start: 2023-10-26

## 2023-10-26 RX ORDER — AMLODIPINE BESYLATE 10 MG/1
10 TABLET ORAL DAILY
Qty: 90 TABLET | Refills: 3 | Status: SHIPPED | OUTPATIENT
Start: 2023-10-26

## 2023-10-26 RX ORDER — ZOLPIDEM TARTRATE 10 MG/1
10 TABLET ORAL
Qty: 30 TABLET | Refills: 5 | Status: SHIPPED | OUTPATIENT
Start: 2023-10-26

## 2023-10-26 RX ORDER — DIAZEPAM 10 MG/1
10 TABLET ORAL EVERY 8 HOURS PRN
Qty: 90 TABLET | Refills: 5 | Status: SHIPPED | OUTPATIENT
Start: 2023-10-26

## 2023-10-26 RX ORDER — LISINOPRIL 20 MG/1
10 TABLET ORAL DAILY
Qty: 90 TABLET | Refills: 3 | Status: SHIPPED | OUTPATIENT
Start: 2023-10-26

## 2023-10-26 NOTE — PROGRESS NOTES
Assessment/Plan:    No problem-specific Assessment & Plan notes found for this encounter. Diagnoses and all orders for this visit:    Encounter for immunization  -     Cancel: influenza vaccine, quadrivalent, 0.5 mL, preservative-free, for adult and pediatric patients 6 mos+ (AFLURIA, FLUARIX, FLULAVAL, FLUZONE)    Wellness examination    Cerebrovascular accident (CVA) due to thrombosis of precerebral artery (HCC)    PFO (patent foramen ovale)    Primary hypertension  -     Comprehensive metabolic panel; Future  -     Lipid Panel with Direct LDL reflex; Future  -     Comprehensive metabolic panel  -     Lipid Panel with Direct LDL reflex    Hemiplegia and hemiparesis following cerebral infarction affecting right dominant side (HCC)    Screening for prostate cancer  -     PSA Total (Reflex To Free); Future  -     PSA Total (Reflex To Free)          Subjective:   Chief Complaint   Patient presents with    Physical Exam        Patient ID: Julio Brown is a 48 y.o. male. HPI    The following portions of the patient's history were reviewed and updated as appropriate: allergies, current medications, past family history, past medical history, past social history, past surgical history and problem list.    Review of Systems   Constitutional:  Negative for chills and fever. HENT:  Negative for facial swelling and sinus pressure. Eyes:  Negative for visual disturbance. Respiratory:  Negative for shortness of breath and wheezing. Cardiovascular:  Negative for chest pain. Gastrointestinal:  Negative for abdominal pain. Musculoskeletal:  Negative for myalgias. Skin:  Negative for rash. Neurological:  Negative for weakness. Psychiatric/Behavioral:  Negative for confusion and hallucinations.           Objective:  Vitals:    10/26/23 1443   BP: 130/82   BP Location: Left arm   Patient Position: Standing   Cuff Size: Standard   Pulse: 72   Temp: (!) 97.1 °F (36.2 °C)   TempSrc: Tympanic   SpO2: 99%   Weight: 82.7 kg (182 lb 6.4 oz)   Height: 5' 10" (1.778 m)      Physical Exam  Constitutional:       General: He is not in acute distress. Appearance: He is well-developed. He is not diaphoretic. HENT:      Right Ear: Ear canal normal. Tympanic membrane is not injected. Left Ear: Ear canal normal. Tympanic membrane is not injected. Nose: Nose normal.      Mouth/Throat:      Pharynx: No oropharyngeal exudate. Eyes:      Conjunctiva/sclera: Conjunctivae normal.      Pupils: Pupils are equal, round, and reactive to light. Neck:      Thyroid: No thyromegaly. Cardiovascular:      Rate and Rhythm: Normal rate and regular rhythm. Heart sounds: Normal heart sounds. No murmur heard. Pulmonary:      Effort: Pulmonary effort is normal. No respiratory distress. Breath sounds: Normal breath sounds. No wheezing. Abdominal:      General: Bowel sounds are normal.      Palpations: Abdomen is soft. There is no hepatomegaly, splenomegaly or mass. Tenderness: There is no abdominal tenderness. Musculoskeletal:         General: No tenderness or deformity. Normal range of motion. Cervical back: Normal range of motion and neck supple. Skin:     General: Skin is warm and dry. Coloration: Skin is not pale. Findings: No rash. Neurological:      Mental Status: He is alert and oriented to person, place, and time. He is not disoriented. Sensory: No sensory deficit.       Gait: Gait normal.   Psychiatric:         Speech: Speech normal.         Behavior: Behavior normal.

## 2023-10-26 NOTE — PROGRESS NOTES
HPI:  Rudy Nieto is a 48 y.o. male here for his yearly health maintenance exam.   Patient Active Problem List   Diagnosis    Chronic pain    History of CVA (cerebrovascular accident)    Hypertension    Tobacco abuse    PFO (patent foramen ovale)    Stroke (HCC)    Anxiety    Poor sleep pattern    Hemiplegia and hemiparesis following cerebral infarction affecting right dominant side [facility])     Past Medical History:   Diagnosis Date    Anxiety     Chronic pain     Headache(784.0) Stroke    Headache, tension-type     Hyperlipidemia     Hypertension     Stroke ([address])        1. Advanced Directive: n     2. Durable Power of  for Healthcare: n     3.  Social History:           Drug and alcohol History: n                  4. Immunizations up to date: y                 Lifestyle:                           Healthy Diet:y                          Alcohol Use:n                          Tobacco Use:y                          Regular exercise:y                          Weight concerns:n                               5. Over the past 2 weeks, how often have you been bothered by the following:              Little interest or pleasure in doing things:n              Felling down, depressed or hopeless:n       Current Outpatient Medications   Medication Sig Dispense Refill    amLODIPine (NORVASC) 10 mg tablet take 1 tablet by mouth once daily 90 tablet 3    aspirin 81 mg chewable tablet chew and swallow 1 tablet by mouth once daily 70 tablet 0    diazepam (VALIUM) 10 mg tablet Take 1 tablet (10 mg total) by mouth every 8 (eight) hours as needed for anxiety 90 tablet 5    diazepam (VALIUM) 10 mg tablet Take 1 tablet (10 mg total) by mouth every 8 (eight) hours as needed for anxiety 90 tablet 5    lisinopril (ZESTRIL) 20 mg tablet Take 0.5 tablets (10 mg total) by mouth daily 90 tablet 3    oxyCODONE-acetaminophen (PERCOCET) 5-325 mg per tablet Take 1 tablet by mouth every 4 (four) hours as needed for moderate pain Max Daily Amount: 6 tablets 150 tablet 0    traMADol (ULTRAM) 50 mg tablet Take 1 tablet (50 mg total) by mouth every 6 (six) hours as needed for moderate pain 120 tablet 5    zolpidem (AMBIEN) 10 mg tablet take 1 tablet by mouth once daily at bedtime for sleep 30 tablet 5    atorvastatin (LIPITOR) 10 mg tablet Take 1 tablet (10 mg total) by mouth daily 90 tablet 3    neomycin-polymyxin-hydrocortisone (CORTISPORIN) 0.35%-10,000 units/mL-1% otic suspension Administer 4 drops to the right ear 4 (four) times a day for 7 days 10 mL 0     No current facility-administered medications for this visit. No Known Allergies  Immunization History   Administered Date(s) Administered    COVID-19 PFIZER VACCINE 0.3 ML IM 03/14/2021, 04/06/2021, 01/09/2022    INFLUENZA 08/30/2020, 09/01/2020       Patient Care Team:  Liliana Garcia MD as PCP - General    Review of Systems   Constitutional:  Negative for fatigue, fever and unexpected weight change. HENT:  Negative for congestion, sinus pain and sore throat. Eyes:  Negative for visual disturbance. Respiratory:  Negative for shortness of breath and wheezing. Cardiovascular:  Negative for chest pain and palpitations. Gastrointestinal:  Negative for abdominal pain, nausea and vomiting. Musculoskeletal: Negative. Negative for arthralgias and myalgias. Neurological:  Negative for syncope, weakness and numbness. Psychiatric/Behavioral: Negative. Negative for confusion, dysphoric mood and suicidal ideas. Physical Exam :  Physical Exam  Constitutional:       Appearance: He is well-developed. HENT:      Right Ear: Ear canal normal. Tympanic membrane is not injected. Left Ear: Ear canal normal. Tympanic membrane is not injected. Nose: Nose normal.   Eyes:      General:         Right eye: No discharge. Left eye: No discharge. Conjunctiva/sclera: Conjunctivae normal.      Pupils: Pupils are equal, round, and reactive to light.    Neck: Thyroid: No thyromegaly. Cardiovascular:      Rate and Rhythm: Normal rate and regular rhythm. Heart sounds: Normal heart sounds. No murmur heard. Pulmonary:      Effort: Pulmonary effort is normal. No respiratory distress. Breath sounds: Normal breath sounds. No wheezing. Abdominal:      General: Bowel sounds are normal. There is no distension. Palpations: Abdomen is soft. Tenderness: There is no abdominal tenderness. Musculoskeletal:         General: Normal range of motion. Cervical back: Normal range of motion and neck supple. Lymphadenopathy:      Cervical: No cervical adenopathy. Skin:     General: Skin is warm and dry. Neurological:      Mental Status: He is alert and oriented to person, place, and time. He is not disoriented. Sensory: No sensory deficit. Motor: No weakness. Coordination: Coordination normal.      Gait: Gait normal.      Deep Tendon Reflexes: Reflexes are normal and symmetric. Psychiatric:         Speech: Speech normal.         Behavior: Behavior normal.         Thought Content: Thought content normal.         Judgment: Judgment normal.           Assessment and Plan:  1. Encounter for immunization  CANCELED: influenza vaccine, quadrivalent, 0.5 mL, preservative-free, for adult and pediatric patients 6 mos+ (AFLURIA, FLUARIX, FLULAVAL, FLUZONE)      2. Wellness examination        3. Cerebrovascular accident (CVA) due to thrombosis of precerebral artery ([address])        4. PFO (patent foramen ovale)        5. Primary hypertension  Comprehensive metabolic panel    Lipid Panel with Direct LDL reflex    Comprehensive metabolic panel    Lipid Panel with Direct LDL reflex      6. Hemiplegia and hemiparesis following cerebral infarction affecting right dominant side ([address])        7.  Screening for prostate cancer  PSA Total (Reflex To Free)    PSA Total (Reflex To Free)          Health Maintenance Due   Topic Date Due    Hepatitis C Screening  Never done    Pneumococcal Vaccine: Pediatrics (0 to 5 Years) and At-Risk Patients (6 to 59 Years) (1 - PCV) Never done    HIV Screening  Never done    DTaP,Tdap,and Td Vaccines (1 - Tdap) Never done    BMI: Followup Plan  01/08/2021    COVID-19 Vaccine (4 - Pfizer series) 03/06/2022    Influenza Vaccine (1) 09/01/2023

## 2023-11-21 DIAGNOSIS — S96.912S STRAIN OF LEFT FOOT, SEQUELA: ICD-10-CM

## 2023-11-21 DIAGNOSIS — S39.012A STRAIN OF LUMBAR REGION, INITIAL ENCOUNTER: ICD-10-CM

## 2023-11-22 RX ORDER — OXYCODONE HYDROCHLORIDE AND ACETAMINOPHEN 5; 325 MG/1; MG/1
1 TABLET ORAL EVERY 4 HOURS PRN
Qty: 30 TABLET | Refills: 0 | Status: SHIPPED | OUTPATIENT
Start: 2023-11-22

## 2023-11-22 RX ORDER — TRAMADOL HYDROCHLORIDE 50 MG/1
50 TABLET ORAL EVERY 6 HOURS PRN
Qty: 120 TABLET | Refills: 0 | Status: SHIPPED | OUTPATIENT
Start: 2023-11-22

## 2023-12-20 DIAGNOSIS — S39.012A STRAIN OF LUMBAR REGION, INITIAL ENCOUNTER: ICD-10-CM

## 2023-12-20 RX ORDER — OXYCODONE HYDROCHLORIDE AND ACETAMINOPHEN 5; 325 MG/1; MG/1
1 TABLET ORAL EVERY 4 HOURS PRN
Qty: 30 TABLET | Refills: 0 | Status: SHIPPED | OUTPATIENT
Start: 2023-12-20

## 2024-01-18 DIAGNOSIS — S39.012A STRAIN OF LUMBAR REGION, INITIAL ENCOUNTER: ICD-10-CM

## 2024-01-18 RX ORDER — OXYCODONE HYDROCHLORIDE AND ACETAMINOPHEN 5; 325 MG/1; MG/1
1 TABLET ORAL EVERY 4 HOURS PRN
Qty: 30 TABLET | Refills: 0 | Status: SHIPPED | OUTPATIENT
Start: 2024-01-18

## 2024-02-19 DIAGNOSIS — G89.29 OTHER CHRONIC PAIN: ICD-10-CM

## 2024-02-19 RX ORDER — OXYCODONE HYDROCHLORIDE AND ACETAMINOPHEN 5; 325 MG/1; MG/1
1 TABLET ORAL EVERY 8 HOURS PRN
Qty: 60 TABLET | Refills: 0 | Status: SHIPPED | OUTPATIENT
Start: 2024-02-19

## 2024-03-14 DIAGNOSIS — G89.29 OTHER CHRONIC PAIN: ICD-10-CM

## 2024-03-15 RX ORDER — OXYCODONE HYDROCHLORIDE AND ACETAMINOPHEN 5; 325 MG/1; MG/1
1 TABLET ORAL EVERY 8 HOURS PRN
Qty: 60 TABLET | Refills: 0 | Status: SHIPPED | OUTPATIENT
Start: 2024-03-15

## 2024-03-30 LAB
ALBUMIN SERPL-MCNC: 4.2 G/DL (ref 3.8–4.9)
ALBUMIN/GLOB SERPL: 1.8 {RATIO} (ref 1.2–2.2)
ALP SERPL-CCNC: 102 IU/L (ref 44–121)
ALT SERPL-CCNC: 16 IU/L (ref 0–44)
AST SERPL-CCNC: 18 IU/L (ref 0–40)
BILIRUB SERPL-MCNC: 0.4 MG/DL (ref 0–1.2)
BUN SERPL-MCNC: 10 MG/DL (ref 6–24)
BUN/CREAT SERPL: 10 (ref 9–20)
CALCIUM SERPL-MCNC: 9 MG/DL (ref 8.7–10.2)
CHLORIDE SERPL-SCNC: 106 MMOL/L (ref 96–106)
CHOLEST SERPL-MCNC: 159 MG/DL (ref 100–199)
CO2 SERPL-SCNC: 24 MMOL/L (ref 20–29)
CREAT SERPL-MCNC: 0.98 MG/DL (ref 0.76–1.27)
EGFR: 93 ML/MIN/1.73
GLOBULIN SER-MCNC: 2.4 G/DL (ref 1.5–4.5)
GLUCOSE SERPL-MCNC: 93 MG/DL (ref 70–99)
HDLC SERPL-MCNC: 34 MG/DL
LDLC SERPL CALC-MCNC: 111 MG/DL (ref 0–99)
LDLC/HDLC SERPL: 3.3 RATIO (ref 0–3.6)
POTASSIUM SERPL-SCNC: 4.3 MMOL/L (ref 3.5–5.2)
PROT SERPL-MCNC: 6.6 G/DL (ref 6–8.5)
PSA SERPL-MCNC: 0.6 NG/ML (ref 0–4)
SL AMB REFLEX CRITERIA: NORMAL
SL AMB VLDL CHOLESTEROL CALC: 14 MG/DL (ref 5–40)
SODIUM SERPL-SCNC: 143 MMOL/L (ref 134–144)
TRIGL SERPL-MCNC: 73 MG/DL (ref 0–149)

## 2024-04-15 ENCOUNTER — OFFICE VISIT (OUTPATIENT)
Dept: FAMILY MEDICINE CLINIC | Facility: CLINIC | Age: 54
End: 2024-04-15
Payer: COMMERCIAL

## 2024-04-15 VITALS
HEIGHT: 70 IN | WEIGHT: 183 LBS | TEMPERATURE: 97.3 F | HEART RATE: 74 BPM | SYSTOLIC BLOOD PRESSURE: 122 MMHG | DIASTOLIC BLOOD PRESSURE: 80 MMHG | OXYGEN SATURATION: 99 % | BODY MASS INDEX: 26.2 KG/M2

## 2024-04-15 DIAGNOSIS — I69.351 HEMIPLEGIA AND HEMIPARESIS FOLLOWING CEREBRAL INFARCTION AFFECTING RIGHT DOMINANT SIDE (HCC): ICD-10-CM

## 2024-04-15 DIAGNOSIS — G89.29 OTHER CHRONIC PAIN: ICD-10-CM

## 2024-04-15 DIAGNOSIS — I16.1 HYPERTENSIVE EMERGENCY: ICD-10-CM

## 2024-04-15 DIAGNOSIS — I63.00 CEREBROVASCULAR ACCIDENT (CVA) DUE TO THROMBOSIS OF PRECEREBRAL ARTERY (HCC): Primary | ICD-10-CM

## 2024-04-15 DIAGNOSIS — S39.012D STRAIN OF LUMBAR REGION, SUBSEQUENT ENCOUNTER: ICD-10-CM

## 2024-04-15 DIAGNOSIS — Q21.12 PFO (PATENT FORAMEN OVALE): ICD-10-CM

## 2024-04-15 PROCEDURE — 99214 OFFICE O/P EST MOD 30 MIN: CPT | Performed by: FAMILY MEDICINE

## 2024-04-15 RX ORDER — OXYCODONE HYDROCHLORIDE AND ACETAMINOPHEN 5; 325 MG/1; MG/1
1 TABLET ORAL EVERY 8 HOURS PRN
Qty: 60 TABLET | Refills: 0 | Status: SHIPPED | OUTPATIENT
Start: 2024-04-15

## 2024-04-15 RX ORDER — LISINOPRIL 10 MG/1
10 TABLET ORAL DAILY
Qty: 90 TABLET | Refills: 3 | Status: SHIPPED | OUTPATIENT
Start: 2024-04-15

## 2024-04-15 NOTE — PROGRESS NOTES
"Assessment/Plan:    Chronic pain  Pain contract is signed- abuse potential low.  Pain is moderate-severe/non-neuropathic.  Physical modalities/NSAIDs/pain specialists have no more options.   UDS done yearly and PRN.       Diagnoses and all orders for this visit:    Cerebrovascular accident (CVA) due to thrombosis of precerebral artery (HCC)    Hemiplegia and hemiparesis following cerebral infarction affecting right dominant side (HCC)    Strain of lumbar region, subsequent encounter    Other chronic pain          Subjective:   Chief Complaint   Patient presents with    med check        Patient ID: Kendall Rodriguez is a 53 y.o. male.    HPI    The following portions of the patient's history were reviewed and updated as appropriate: allergies, current medications, past family history, past medical history, past social history, past surgical history and problem list.    Review of Systems   Constitutional:  Negative for fatigue, fever and unexpected weight change.   HENT:  Negative for congestion, sinus pain and sore throat.    Eyes:  Negative for visual disturbance.   Respiratory:  Negative for shortness of breath and wheezing.    Cardiovascular:  Negative for chest pain and palpitations.   Gastrointestinal:  Negative for abdominal pain, nausea and vomiting.   Musculoskeletal: Negative.  Negative for arthralgias and myalgias.   Neurological:  Negative for syncope, weakness and numbness.   Psychiatric/Behavioral: Negative.  Negative for confusion, dysphoric mood and suicidal ideas.          Objective:  Vitals:    04/15/24 1412   BP: 122/80   BP Location: Left arm   Patient Position: Sitting   Cuff Size: Standard   Pulse: 74   Temp: (!) 97.3 °F (36.3 °C)   TempSrc: Tympanic   SpO2: 99%   Weight: 83 kg (183 lb)   Height: 5' 10\" (1.778 m)      Physical Exam  Constitutional:       Appearance: He is well-developed.   HENT:      Right Ear: Ear canal normal. Tympanic membrane is not injected.      Left Ear: Ear canal " normal. Tympanic membrane is not injected.      Nose: Nose normal.   Eyes:      General:         Right eye: No discharge.         Left eye: No discharge.      Conjunctiva/sclera: Conjunctivae normal.      Pupils: Pupils are equal, round, and reactive to light.   Neck:      Thyroid: No thyromegaly.   Cardiovascular:      Rate and Rhythm: Normal rate and regular rhythm.      Heart sounds: Normal heart sounds. No murmur heard.  Pulmonary:      Effort: Pulmonary effort is normal. No respiratory distress.      Breath sounds: Normal breath sounds. No wheezing.   Abdominal:      General: Bowel sounds are normal. There is no distension.      Palpations: Abdomen is soft.      Tenderness: There is no abdominal tenderness.   Musculoskeletal:         General: Normal range of motion.      Cervical back: Normal range of motion and neck supple.   Lymphadenopathy:      Cervical: No cervical adenopathy.   Skin:     General: Skin is warm and dry.   Neurological:      Mental Status: He is alert and oriented to person, place, and time. He is not disoriented.      Sensory: No sensory deficit.      Motor: No weakness.      Coordination: Coordination normal.      Gait: Gait normal.      Deep Tendon Reflexes: Reflexes are normal and symmetric.   Psychiatric:         Speech: Speech normal.         Behavior: Behavior normal.         Thought Content: Thought content normal.         Judgment: Judgment normal.

## 2024-04-15 NOTE — ASSESSMENT & PLAN NOTE
Pain contract is signed- abuse potential low.  Pain is moderate-severe/non-neuropathic.  Physical modalities/NSAIDs/pain specialists have no more options.   UDS done yearly and PRN.

## 2024-05-04 DIAGNOSIS — S96.912S STRAIN OF LEFT FOOT, SEQUELA: ICD-10-CM

## 2024-05-06 RX ORDER — DIAZEPAM 10 MG/1
10 TABLET ORAL EVERY 8 HOURS PRN
Qty: 90 TABLET | Refills: 5 | Status: SHIPPED | OUTPATIENT
Start: 2024-05-06

## 2024-05-13 DIAGNOSIS — G89.29 OTHER CHRONIC PAIN: ICD-10-CM

## 2024-05-13 DIAGNOSIS — G47.00 INSOMNIA, UNSPECIFIED TYPE: ICD-10-CM

## 2024-05-13 RX ORDER — OXYCODONE HYDROCHLORIDE AND ACETAMINOPHEN 5; 325 MG/1; MG/1
1 TABLET ORAL EVERY 8 HOURS PRN
Qty: 60 TABLET | Refills: 0 | Status: CANCELLED | OUTPATIENT
Start: 2024-05-13

## 2024-05-15 DIAGNOSIS — G89.29 OTHER CHRONIC PAIN: ICD-10-CM

## 2024-05-16 RX ORDER — OXYCODONE HYDROCHLORIDE AND ACETAMINOPHEN 5; 325 MG/1; MG/1
1 TABLET ORAL EVERY 8 HOURS PRN
Qty: 60 TABLET | Refills: 0 | Status: SHIPPED | OUTPATIENT
Start: 2024-05-16

## 2024-05-16 RX ORDER — ZOLPIDEM TARTRATE 10 MG/1
10 TABLET ORAL
Qty: 30 TABLET | Refills: 5 | Status: SHIPPED | OUTPATIENT
Start: 2024-05-16

## 2024-05-16 NOTE — TELEPHONE ENCOUNTER
Patient called to request a refill for their Oxycodone advised a refill was requested on 05/15/24 and is pending approval. Patient verbalized understanding and is in agreement.     Pt is all out of medication re routing HP

## 2024-06-12 DIAGNOSIS — I63.9 STROKE (CEREBRUM) (HCC): ICD-10-CM

## 2024-06-12 RX ORDER — ASPIRIN 81 MG/1
TABLET, CHEWABLE ORAL
Qty: 90 TABLET | Refills: 0 | Status: SHIPPED | OUTPATIENT
Start: 2024-06-12

## 2024-06-13 DIAGNOSIS — G89.29 OTHER CHRONIC PAIN: ICD-10-CM

## 2024-06-13 DIAGNOSIS — F11.90 OPIOID USE: Primary | ICD-10-CM

## 2024-06-13 RX ORDER — NALOXONE HYDROCHLORIDE 4 MG/.1ML
SPRAY NASAL
Qty: 1 EACH | Refills: 1 | Status: SHIPPED | OUTPATIENT
Start: 2024-06-13 | End: 2025-06-13

## 2024-06-13 RX ORDER — OXYCODONE HYDROCHLORIDE AND ACETAMINOPHEN 5; 325 MG/1; MG/1
1 TABLET ORAL EVERY 8 HOURS PRN
Qty: 60 TABLET | Refills: 0 | Status: SHIPPED | OUTPATIENT
Start: 2024-06-13

## 2024-06-13 NOTE — TELEPHONE ENCOUNTER
Reason for call:   [x] Refill   [] Prior Auth  [] Other:     Office:   [x] PCP/Provider - José Miguel Yap MD /Jesus SEGURA  [] Specialty/Provider -     Medication: oxyCODONE-acetaminophen (PERCOCET) 5-325 mg per tablet     Dose/Frequency: Take 1 tablet by mouth every 8 (eight) hours as needed for moderate pain     Quantity: 60    Pharmacy:  [id number] - [place] - [address] [place] [address]        Does the patient have enough for 3 days?   [] Yes   [x] No - Send as HP to POD

## 2024-06-18 DIAGNOSIS — S96.912S STRAIN OF LEFT FOOT, SEQUELA: ICD-10-CM

## 2024-06-19 RX ORDER — TRAMADOL HYDROCHLORIDE 50 MG/1
50 TABLET ORAL EVERY 6 HOURS PRN
Qty: 120 TABLET | Refills: 5 | Status: SHIPPED | OUTPATIENT
Start: 2024-06-19

## 2024-06-21 ENCOUNTER — TELEPHONE (OUTPATIENT)
Dept: FAMILY MEDICINE CLINIC | Facility: CLINIC | Age: 54
End: 2024-06-21

## 2024-06-21 NOTE — TELEPHONE ENCOUNTER
Pt called the refill line stating he needed to get his Medical Marijuana card renewed. Please call pt when this is done or there are questions.

## 2024-07-10 ENCOUNTER — NURSE TRIAGE (OUTPATIENT)
Dept: OTHER | Facility: HOSPITAL | Age: 54
End: 2024-07-10

## 2024-07-10 DIAGNOSIS — G89.29 OTHER CHRONIC PAIN: ICD-10-CM

## 2024-07-10 NOTE — TELEPHONE ENCOUNTER
Reason for call:   [x] Refill   [] Prior Auth  [] Other:     Office:   [x] PCP/Provider - /ANEL Castrejon /[facility]  [] Specialty/Provider -     Medication:     oxyCODONE-acetaminophen (PERCOCET) 5-325 mg per tablet       Dose/Frequency: Take 1 tablet by mouth every 8 (eight) hours as needed for moderate pain     Quantity: 60    Pharmacy:   [id number] - [place] - [address]        Does the patient have enough for 3 days?   [] Yes   [x] No - Send as HP to POD

## 2024-07-11 RX ORDER — OXYCODONE HYDROCHLORIDE AND ACETAMINOPHEN 5; 325 MG/1; MG/1
1 TABLET ORAL EVERY 8 HOURS PRN
Qty: 60 TABLET | Refills: 0 | Status: SHIPPED | OUTPATIENT
Start: 2024-07-11

## 2024-08-05 DIAGNOSIS — G89.29 OTHER CHRONIC PAIN: ICD-10-CM

## 2024-08-05 RX ORDER — OXYCODONE HYDROCHLORIDE AND ACETAMINOPHEN 5; 325 MG/1; MG/1
1 TABLET ORAL EVERY 8 HOURS PRN
Qty: 60 TABLET | Refills: 0 | Status: SHIPPED | OUTPATIENT
Start: 2024-08-05

## 2024-08-05 NOTE — TELEPHONE ENCOUNTER
Patient is aware of Dr Sutton message to have the prescription last the month. I had patient schedule a follow up for 10/9/24

## 2024-08-05 NOTE — TELEPHONE ENCOUNTER
Reason for call:   [x] Refill   [] Prior Auth  [] Other:     Office:   [x] PCP/Provider -   [] Specialty/Provider -     Medication: Oxycodone-Acetaminophen     Dose/Frequency: 5-325 mg per tablet. Take 1 tablet by mouth every 8 hours as needed for moderate pain.    Quantity: 60    Pharmacy: [id number] - [place] - [address] [facility] [phone]     Does the patient have enough for 3 days?   [] Yes   [x] No - Send as HP to POD

## 2024-09-03 DIAGNOSIS — G89.29 OTHER CHRONIC PAIN: ICD-10-CM

## 2024-09-03 NOTE — TELEPHONE ENCOUNTER
Reason for call:   [x] Refill   [] Prior Auth  [] Other:     Office:   [x] PCP/Provider -   [] Specialty/Provider -                     Does the patient have enough for 3 days?   [] Yes   [] No - Send as HP to POD

## 2024-09-04 RX ORDER — OXYCODONE AND ACETAMINOPHEN 5; 325 MG/1; MG/1
1 TABLET ORAL EVERY 8 HOURS PRN
Qty: 60 TABLET | Refills: 0 | Status: SHIPPED | OUTPATIENT
Start: 2024-09-04

## 2024-10-01 DIAGNOSIS — G89.29 OTHER CHRONIC PAIN: ICD-10-CM

## 2024-10-01 RX ORDER — OXYCODONE AND ACETAMINOPHEN 5; 325 MG/1; MG/1
1 TABLET ORAL EVERY 8 HOURS PRN
Qty: 60 TABLET | Refills: 0 | Status: SHIPPED | OUTPATIENT
Start: 2024-10-01

## 2024-10-01 NOTE — TELEPHONE ENCOUNTER
Reason for call:   [x] Refill   [] Prior Auth  [] Other:     Office:   [x] PCP/Provider -   Ordering Department: [facility]  Authorized By: José Miguel Yap MD  [] Specialty/Provider -     Medication: oxyCODONE-acetaminophen (PERCOCET) 5-325 mg per tablet     Dose/Frequency:  Take 1 tablet by mouth every 8 (eight) hours as needed for moderate pain Max Daily Amount: 3 tablets     Quantity: 60    Pharmacy: [id number] - [place] - [address] [facility] [phone]    Does the patient have enough for 3 days?   [] Yes   [x] No - Send as HP to POD     Initial (On Arrival)

## 2024-10-09 ENCOUNTER — OFFICE VISIT (OUTPATIENT)
Dept: FAMILY MEDICINE CLINIC | Facility: CLINIC | Age: 54
End: 2024-10-09
Payer: COMMERCIAL

## 2024-10-09 VITALS
TEMPERATURE: 97.5 F | HEART RATE: 69 BPM | OXYGEN SATURATION: 97 % | WEIGHT: 179.6 LBS | HEIGHT: 70 IN | BODY MASS INDEX: 25.71 KG/M2

## 2024-10-09 DIAGNOSIS — F11.20 UNCOMPLICATED OPIOID DEPENDENCE (HCC): ICD-10-CM

## 2024-10-09 DIAGNOSIS — I10 PRIMARY HYPERTENSION: Primary | ICD-10-CM

## 2024-10-09 DIAGNOSIS — F11.20 CONTINUOUS OPIOID DEPENDENCE (HCC): ICD-10-CM

## 2024-10-09 DIAGNOSIS — G89.29 OTHER CHRONIC PAIN: ICD-10-CM

## 2024-10-09 DIAGNOSIS — I63.00 CEREBROVASCULAR ACCIDENT (CVA) DUE TO THROMBOSIS OF PRECEREBRAL ARTERY (HCC): ICD-10-CM

## 2024-10-09 PROCEDURE — 99214 OFFICE O/P EST MOD 30 MIN: CPT | Performed by: FAMILY MEDICINE

## 2024-10-09 NOTE — PROGRESS NOTES
"Assessment/Plan:    Uncomplicated opioid dependence (HCC)  Pain contract is signed- abuse potential low.  Pain is moderate-severe/non-neuropathic.  Physical modalities/NSAIDs/pain specialists have no more options.   UDS done yearly and PRN.       Diagnoses and all orders for this visit:    Primary hypertension    Cerebrovascular accident (CVA) due to thrombosis of precerebral artery (HCC)    Other chronic pain    Uncomplicated opioid dependence (HCC)          Subjective:   Chief Complaint   Patient presents with    Follow-up     Med check.         Patient ID: Kendall Rodriguez is a 54 y.o. male.    HPI    The following portions of the patient's history were reviewed and updated as appropriate: allergies, current medications, past family history, past medical history, past social history, past surgical history and problem list.    Review of Systems   Constitutional:  Negative for fatigue, fever and unexpected weight change.   HENT:  Negative for congestion, sinus pain and sore throat.    Eyes:  Negative for visual disturbance.   Respiratory:  Negative for shortness of breath and wheezing.    Cardiovascular:  Negative for chest pain and palpitations.   Gastrointestinal:  Negative for abdominal pain, nausea and vomiting.   Musculoskeletal: Negative.  Negative for arthralgias and myalgias.   Neurological:  Negative for syncope, weakness and numbness.   Psychiatric/Behavioral: Negative.  Negative for confusion, dysphoric mood and suicidal ideas.          Objective:  Vitals:    10/09/24 1542   Pulse: 69   Temp: 97.5 °F (36.4 °C)   TempSrc: Tympanic   SpO2: 97%   Weight: 81.5 kg (179 lb 9.6 oz)   Height: 5' 10\" (1.778 m)      Physical Exam  Constitutional:       Appearance: He is well-developed.   HENT:      Right Ear: Ear canal normal. Tympanic membrane is not injected.      Left Ear: Ear canal normal. Tympanic membrane is not injected.      Nose: Nose normal.   Eyes:      General:         Right eye: No discharge.    "      Left eye: No discharge.      Conjunctiva/sclera: Conjunctivae normal.      Pupils: Pupils are equal, round, and reactive to light.   Neck:      Thyroid: No thyromegaly.   Cardiovascular:      Rate and Rhythm: Normal rate and regular rhythm.      Heart sounds: Normal heart sounds. No murmur heard.  Pulmonary:      Effort: Pulmonary effort is normal. No respiratory distress.      Breath sounds: Normal breath sounds. No wheezing.   Abdominal:      General: Bowel sounds are normal. There is no distension.      Palpations: Abdomen is soft.      Tenderness: There is no abdominal tenderness.   Musculoskeletal:         General: Normal range of motion.      Cervical back: Normal range of motion and neck supple.   Lymphadenopathy:      Cervical: No cervical adenopathy.   Skin:     General: Skin is warm and dry.   Neurological:      Mental Status: He is alert and oriented to person, place, and time. He is not disoriented.      Sensory: No sensory deficit.      Motor: No weakness.      Coordination: Coordination normal.      Gait: Gait normal.      Deep Tendon Reflexes: Reflexes are normal and symmetric.   Psychiatric:         Speech: Speech normal.         Behavior: Behavior normal.         Thought Content: Thought content normal.         Judgment: Judgment normal.

## 2024-10-12 LAB
4OH-XYLAZINE UR QL CFM: NEGATIVE NG/ML
6MAM UR QL CFM: NEGATIVE NG/ML
7AMINOCLONAZEPAM UR QL CFM: NEGATIVE NG/ML
A-OH ALPRAZ UR QL CFM: NEGATIVE NG/ML
ACCEPTABLE CREAT UR QL: NORMAL MG/DL
ACCEPTIBLE SP GR UR QL: NORMAL
AMPHET UR QL CFM: NEGATIVE NG/ML
BUPRENORPHINE UR QL CFM: NEGATIVE NG/ML
BUTALBITAL UR QL CFM: NEGATIVE NG/ML
BZE UR QL CFM: NEGATIVE NG/ML
CODEINE UR QL CFM: NEGATIVE NG/ML
EDDP UR QL CFM: NEGATIVE NG/ML
ETHYL GLUCURONIDE UR QL CFM: NEGATIVE NG/ML
ETHYL SULFATE UR QL SCN: NEGATIVE NG/ML
EUTYLONE UR QL: NEGATIVE NG/ML
FENTANYL UR QL CFM: NEGATIVE NG/ML
GLIADIN IGG SER IA-ACNC: NEGATIVE NG/ML
HYDROCODONE UR QL CFM: NEGATIVE NG/ML
HYDROMORPHONE UR QL CFM: NEGATIVE NG/ML
LORAZEPAM UR QL CFM: NEGATIVE NG/ML
ME-PHENIDATE UR QL CFM: NEGATIVE NG/ML
MEPERIDINE UR QL CFM: NEGATIVE NG/ML
METHADONE UR QL CFM: NEGATIVE NG/ML
METHAMPHET UR QL CFM: NEGATIVE NG/ML
MORPHINE UR QL CFM: NEGATIVE NG/ML
NALTREXONE UR QL CFM: NEGATIVE NG/ML
NITRITE UR QL: NORMAL UG/ML
NORBUPRENORPHINE UR QL CFM: NEGATIVE NG/ML
NORDIAZEPAM UR QL CFM: NORMAL NG/ML
NORFENTANYL UR QL CFM: NEGATIVE NG/ML
NORHYDROCODONE UR QL CFM: NEGATIVE NG/ML
NORMEPERIDINE UR QL CFM: NEGATIVE NG/ML
NOROXYCODONE UR QL CFM: ABNORMAL NG/ML
OXAZEPAM UR QL CFM: NORMAL NG/ML
OXYCODONE UR QL CFM: ABNORMAL NG/ML
OXYMORPHONE UR QL CFM: ABNORMAL NG/ML
PARA-FLUOROFENTANYL QUANTIFICATION: NORMAL NG/ML
PHENOBARB UR QL CFM: NEGATIVE NG/ML
RESULT ALL_PRESCRIBED MEDS AND SPECIAL INSTRUCTIONS: NORMAL
SECOBARBITAL UR QL CFM: NEGATIVE NG/ML
SL AMB 5F-ADB-M7 METABOLITE QUANTIFICATION: NEGATIVE NG/ML
SL AMB 7-OH-MITRAGYNINE (KRATOM ALKALOID) QUANTIFICATION: NEGATIVE NG/ML
SL AMB AB-FUBINACA-M3 METABOLITE QUANTIFICATION: NEGATIVE NG/ML
SL AMB ACETYL FENTANYL QUANTIFICATION: NORMAL NG/ML
SL AMB ACETYL NORFENTANYL QUANTIFICATION: NORMAL NG/ML
SL AMB ACRYL FENTANYL QUANTIFICATION: NORMAL NG/ML
SL AMB CARFENTANIL QUANTIFICATION: NORMAL NG/ML
SL AMB CTHC (MARIJUANA METABOLITE) QUANTIFICATION: NEGATIVE NG/ML
SL AMB DEXTRORPHAN (DEXTROMETHORPHAN METABOLITE) QUANT: NEGATIVE NG/ML
SL AMB GABAPENTIN QUANTIFICATION: NEGATIVE NG/ML
SL AMB JWH018 METABOLITE QUANTIFICATION: NEGATIVE NG/ML
SL AMB JWH073 METABOLITE QUANTIFICATION: NEGATIVE NG/ML
SL AMB MDMB-FUBINACA-M1 METABOLITE QUANTIFICATION: NEGATIVE NG/ML
SL AMB METHYLONE QUANTIFICATION: NEGATIVE NG/ML
SL AMB N-DESMETHYL-TRAMADOL QUANTIFICATION: NORMAL NG/ML
SL AMB PHENTERMINE QUANTIFICATION: NEGATIVE NG/ML
SL AMB PREGABALIN QUANTIFICATION: NEGATIVE NG/ML
SL AMB RCS4 METABOLITE QUANTIFICATION: NEGATIVE NG/ML
SL AMB RITALINIC ACID QUANTIFICATION: NEGATIVE NG/ML
SMOOTH MUSCLE AB TITR SER IF: NEGATIVE NG/ML
SPECIMEN DRAWN SERPL: NEGATIVE NG/ML
SPECIMEN PH ACCEPTABLE UR: NORMAL
TAPENTADOL UR QL CFM: NEGATIVE NG/ML
TEMAZEPAM UR QL CFM: NORMAL NG/ML
TRAMADOL UR QL CFM: NORMAL NG/ML
URATE/CREAT 24H UR: NORMAL NG/ML
XYLAZINE UR QL CFM: NEGATIVE NG/ML

## 2024-10-27 DIAGNOSIS — S96.912S STRAIN OF LEFT FOOT, SEQUELA: ICD-10-CM

## 2024-10-28 RX ORDER — DIAZEPAM 10 MG/1
10 TABLET ORAL EVERY 8 HOURS PRN
Qty: 90 TABLET | Refills: 5 | Status: SHIPPED | OUTPATIENT
Start: 2024-10-28

## 2024-10-28 NOTE — TELEPHONE ENCOUNTER
Patient called to request a refill for their diazepam advised a refill was requested on 10/27/24 and is pending approval. Patient verbalized understanding and is in agreement.

## 2024-11-01 DIAGNOSIS — G89.29 OTHER CHRONIC PAIN: ICD-10-CM

## 2024-11-01 NOTE — TELEPHONE ENCOUNTER
Reason for call:   [x] Refill   [] Prior Auth  [] Other:     Office:   [x] PCP/Provider -   [] Specialty/Provider -     Medication: oxyCODONE-acetaminophen (PERCOCET) 5-325 mg Take 1 tablet by mouth every 8 (eight) hours as needed for moderate pain       [facility]: [facility]     Does the patient have enough for 3 days?   [] Yes   [x] No - Send as HP to POD

## 2024-11-02 RX ORDER — OXYCODONE AND ACETAMINOPHEN 5; 325 MG/1; MG/1
1 TABLET ORAL EVERY 8 HOURS PRN
Qty: 60 TABLET | Refills: 0 | Status: SHIPPED | OUTPATIENT
Start: 2024-11-02

## 2024-11-29 DIAGNOSIS — G89.29 OTHER CHRONIC PAIN: ICD-10-CM

## 2024-11-29 NOTE — TELEPHONE ENCOUNTER
Reason for call:   [x] Refill   [] Prior Auth  [] Other:     Office:   [x] PCP/Provider - [facility] [place]    Medication: oxycodone-acetaminophen    Dose/Frequency: 5-325mg q8 prn    Quantity: 60    Pharmacy: [id number] - [place] - [address] [place] [address]     Does the patient have enough for 3 days?   [] Yes   [x] No - Send as HP to POD

## 2024-11-30 RX ORDER — OXYCODONE AND ACETAMINOPHEN 5; 325 MG/1; MG/1
1 TABLET ORAL EVERY 8 HOURS PRN
Qty: 60 TABLET | Refills: 0 | Status: SHIPPED | OUTPATIENT
Start: 2024-11-30

## 2024-12-11 DIAGNOSIS — G47.00 INSOMNIA, UNSPECIFIED TYPE: ICD-10-CM

## 2024-12-12 ENCOUNTER — TELEPHONE (OUTPATIENT)
Age: 54
End: 2024-12-12

## 2024-12-12 RX ORDER — ZOLPIDEM TARTRATE 10 MG/1
10 TABLET ORAL
Qty: 30 TABLET | Refills: 5 | Status: SHIPPED | OUTPATIENT
Start: 2024-12-12

## 2024-12-12 NOTE — TELEPHONE ENCOUNTER
PA for zolpidem (AMBIEN) 10 mg tablet APPROVED     Date(s) approved 12/12/24-12/12/26    Case # [id number]    Patient advised by          []Rocket Raisehart Message  []Phone call   [x]LMOM  []L/M to call office as no active Communication consent on file  []Unable to leave detailed message as VM not approved on Communication consent       Pharmacy advised by    [x]Fax  []Phone call    Approval letter scanned into Media Yes

## 2024-12-12 NOTE — TELEPHONE ENCOUNTER
PA for zolpidem (AMBIEN) 10 mg tablet SUBMITTED to [employer]    via    []CMM-KEY:   [x]Surescripts-Case ID# [id number]   []Availity-Auth ID # NDC #   []Faxed to plan   []Other website   []Phone call Case ID #     []PA sent as URGENT    All office notes, labs and other pertaining documents and studies sent. Clinical questions answered. Awaiting determination from insurance company.     Turnaround time for your insurance to make a decision on your Prior Authorization can take 7-21 business days.

## 2024-12-26 DIAGNOSIS — G89.29 OTHER CHRONIC PAIN: ICD-10-CM

## 2024-12-26 RX ORDER — OXYCODONE AND ACETAMINOPHEN 5; 325 MG/1; MG/1
1 TABLET ORAL EVERY 8 HOURS PRN
Qty: 60 TABLET | Refills: 0 | Status: SHIPPED | OUTPATIENT
Start: 2024-12-26

## 2024-12-26 NOTE — TELEPHONE ENCOUNTER
Reason for call:   [x] Refill   [] Prior Auth  [] Other:     Office:   [x] PCP/Provider -   [] Specialty/Provider -     Medication:     oxyCODONE-acetaminophen (PERCOCET) 5-325 mg per tablet       Dose/Frequency:     1 tablet, Oral, Every 8 hours PRN       Quantity: 60    Pharmacy: [id number] - [place] - [address] [facility]     Does the patient have enough for 3 days?   [] Yes   [x] No - Send as HP to POD

## 2024-12-30 DIAGNOSIS — S96.912S STRAIN OF LEFT FOOT, SEQUELA: ICD-10-CM

## 2024-12-31 RX ORDER — TRAMADOL HYDROCHLORIDE 50 MG/1
50 TABLET ORAL EVERY 6 HOURS PRN
Qty: 120 TABLET | Refills: 5 | Status: SHIPPED | OUTPATIENT
Start: 2024-12-31

## 2025-01-23 DIAGNOSIS — G89.29 OTHER CHRONIC PAIN: ICD-10-CM

## 2025-01-23 RX ORDER — OXYCODONE AND ACETAMINOPHEN 5; 325 MG/1; MG/1
1 TABLET ORAL EVERY 8 HOURS PRN
Qty: 60 TABLET | Refills: 0 | Status: SHIPPED | OUTPATIENT
Start: 2025-01-23

## 2025-01-23 NOTE — TELEPHONE ENCOUNTER
Reason for call:   [x] Refill   [] Prior Auth  [] Other:     Office:   [x] PCP/Provider - José Miguel Yap  [] Specialty/Provider -     Medication: oxyCODONE-acetaminophen (PERCOCET) 5-325 mg per tablet     Dose/Frequency:     Take 1 tablet by mouth every 8 (eight) hours as needed for moderate pain Max Daily Amount: 3 tablets       Quantity: 60    Pharmacy: [id number] - [place] - [address] [place] [phone]     Does the patient have enough for 3 days?   [] Yes   [x] No - Send as HP to POD

## 2025-01-27 ENCOUNTER — TELEPHONE (OUTPATIENT)
Age: 55
End: 2025-01-27

## 2025-01-27 NOTE — TELEPHONE ENCOUNTER
[place] for OxyCODONE-acetaminophen (PERCOCET) 5-325 mg per tablet APPROVED     Date(s) approved January 27, 2025 to July 26, 2025     Case #: [id number]       Patient advised by          []Blackboardhart Message  [x]Phone call   []LMOM  [x]L/M to call office as no active Communication consent on file  []Unable to leave detailed message as VM not approved on Communication consent       Pharmacy advised by    [x]Fax  []Phone call    Approval letter scanned into Media Yes

## 2025-01-27 NOTE — TELEPHONE ENCOUNTER
PA for OxyCODONE-acetaminophen (PERCOCET) 5-325 mg per tablet SUBMITTED to [facility]    via    []CMM-KEY:   [x]Surescripts-Case ID #: [id number]   []Availity-Auth ID #   []Faxed to plan   []Other website   []Phone call Case ID #     []PA sent as URGENT    All office notes, labs and other pertaining documents and studies sent. Clinical questions answered. Awaiting determination from insurance company.     Turnaround time for your insurance to make a decision on your Prior Authorization can take 7-21 business days.

## 2025-02-21 DIAGNOSIS — S96.912S STRAIN OF LEFT FOOT, SEQUELA: ICD-10-CM

## 2025-02-21 DIAGNOSIS — G89.29 OTHER CHRONIC PAIN: ICD-10-CM

## 2025-02-21 RX ORDER — OXYCODONE AND ACETAMINOPHEN 5; 325 MG/1; MG/1
1 TABLET ORAL EVERY 8 HOURS PRN
Qty: 60 TABLET | Refills: 0 | Status: SHIPPED | OUTPATIENT
Start: 2025-02-21

## 2025-02-21 NOTE — TELEPHONE ENCOUNTER
Reason for call:   [x] Refill   [] Prior Auth  [] Other:     Office:   [x] PCP/Provider -   [] Specialty/Provider -     Medication: Oxycodone-Acetaminophen     Dose/Frequency: 5-325 mg per tablet taken by mouth once every 8 hours PRN for moderate pain     Quantity: 60    Pharmacy: [id number] - [place] - [address] [place] [phone]     Does the patient have enough for 3 days?   [] Yes   [x] No - Send as HP to POD

## 2025-03-17 ENCOUNTER — OFFICE VISIT (OUTPATIENT)
Dept: FAMILY MEDICINE CLINIC | Facility: CLINIC | Age: 55
End: 2025-03-17
Payer: COMMERCIAL

## 2025-03-17 VITALS
WEIGHT: 191 LBS | DIASTOLIC BLOOD PRESSURE: 88 MMHG | RESPIRATION RATE: 18 BRPM | BODY MASS INDEX: 27.35 KG/M2 | SYSTOLIC BLOOD PRESSURE: 128 MMHG | OXYGEN SATURATION: 98 % | HEIGHT: 70 IN | HEART RATE: 77 BPM

## 2025-03-17 DIAGNOSIS — I63.00 CEREBROVASCULAR ACCIDENT (CVA) DUE TO THROMBOSIS OF PRECEREBRAL ARTERY (HCC): Primary | ICD-10-CM

## 2025-03-17 DIAGNOSIS — G89.4 CHRONIC PAIN SYNDROME: ICD-10-CM

## 2025-03-17 DIAGNOSIS — I69.351 HEMIPLEGIA AND HEMIPARESIS FOLLOWING CEREBRAL INFARCTION AFFECTING RIGHT DOMINANT SIDE (HCC): ICD-10-CM

## 2025-03-17 PROCEDURE — 99214 OFFICE O/P EST MOD 30 MIN: CPT | Performed by: FAMILY MEDICINE

## 2025-03-17 NOTE — ASSESSMENT & PLAN NOTE
Stable on current medication regimen. Patient to continue prescribed medication.   Not cleared for jury duty---juror #[id number]

## 2025-03-17 NOTE — LETTER
Date: 3/17/2025    To whom it may concern:     This is to certify that Kendall Rodriguez has been under my care for the following diagnosis: History of Stroke,Hemiplegia and hemiparesis following cerebral infarction affecting right dominant side .    Not cleared for jury duty permanently  ---juror #[id number]       I feel that Kendall Rodriguez is unable to serve on Jury Duty at this time for the above mentioned medical reasons.                  Sincerely,      José Miguel Yap MD

## 2025-03-17 NOTE — PROGRESS NOTES
"Name: Kendall Rodriguez      : 1970      MRN: [id number]  Encounter Provider: José Miguel Yap MD  Encounter Date: 3/17/2025   Encounter department: [facility]  :  Assessment & Plan  Cerebrovascular accident (CVA) due to thrombosis of precerebral artery (HCC)  Stable on current medication regimen. Patient to continue prescribed medication.            Hemiplegia and hemiparesis following cerebral infarction affecting right dominant side (HCC)  Stable on current medication regimen. Patient to continue prescribed medication.   Not cleared for jury duty---juror #[id number]         Chronic pain syndrome  Pain contract is signed- abuse potential low.  Pain is moderate-severe/non-neuropathic.  Physical modalities/NSAIDs/pain specialists have no more options.   UDS done yearly and PRN.                  History of Present Illness   HPI  Review of Systems   Constitutional:  Negative for fatigue, fever and unexpected weight change.   HENT:  Negative for congestion, sinus pain and sore throat.    Eyes:  Negative for visual disturbance.   Respiratory:  Negative for shortness of breath and wheezing.    Cardiovascular:  Negative for chest pain and palpitations.   Gastrointestinal:  Negative for abdominal pain, nausea and vomiting.   Musculoskeletal: Negative.  Negative for arthralgias and myalgias.   Neurological:  Negative for syncope, weakness and numbness.   Psychiatric/Behavioral: Negative.  Negative for confusion, dysphoric mood and suicidal ideas.        Objective   /88   Pulse 77   Resp 18   Ht 5' 10\" (1.778 m)   Wt 86.6 kg (191 lb)   SpO2 98%   BMI 27.41 kg/m²      Physical Exam  Vitals and nursing note reviewed.   Constitutional:       General: He is not in acute distress.     Appearance: He is well-developed.   HENT:      Head: Normocephalic and atraumatic.   Eyes:      Conjunctiva/sclera: Conjunctivae normal.   Cardiovascular:      Rate and Rhythm: Normal rate and " regular rhythm.      Heart sounds: No murmur heard.  Pulmonary:      Effort: Pulmonary effort is normal. No respiratory distress.      Breath sounds: Normal breath sounds.   Abdominal:      Palpations: Abdomen is soft.      Tenderness: There is no abdominal tenderness.   Musculoskeletal:         General: No swelling.      Cervical back: Neck supple.   Skin:     General: Skin is warm and dry.      Capillary Refill: Capillary refill takes less than 2 seconds.   Neurological:      Mental Status: He is alert.   Psychiatric:         Mood and Affect: Mood normal.

## 2025-03-19 DIAGNOSIS — G89.29 OTHER CHRONIC PAIN: ICD-10-CM

## 2025-03-19 RX ORDER — OXYCODONE AND ACETAMINOPHEN 5; 325 MG/1; MG/1
1 TABLET ORAL EVERY 8 HOURS PRN
Qty: 60 TABLET | Refills: 0 | Status: SHIPPED | OUTPATIENT
Start: 2025-03-19

## 2025-03-19 NOTE — TELEPHONE ENCOUNTER
Reason for call: Out of medication   [x] Refill   [] Prior Auth  [] Other:     Office:   [x] PCP/Provider -   [] Specialty/Provider -     Medication: Percocet    Dose/Frequency: 5-325 mg Q8H PRN     Quantity: 60    Pharmacy: [facility] on file     Local Pharmacy   Does the patient have enough for 3 days?   [] Yes   [x] No - Send as HP to POD    Mail Away Pharmacy   Does the patient have enough for 10 days?   [] Yes   [] No - Send as HP to POD

## 2025-04-17 DIAGNOSIS — S96.912S STRAIN OF LEFT FOOT, SEQUELA: ICD-10-CM

## 2025-04-17 DIAGNOSIS — G89.29 OTHER CHRONIC PAIN: ICD-10-CM

## 2025-04-17 RX ORDER — OXYCODONE AND ACETAMINOPHEN 5; 325 MG/1; MG/1
1 TABLET ORAL EVERY 8 HOURS PRN
Qty: 60 TABLET | Refills: 0 | OUTPATIENT
Start: 2025-04-17

## 2025-04-17 NOTE — TELEPHONE ENCOUNTER
Reason for call:   [x] Refill   [] Prior Auth  [] Other:     Office:   [x] PCP/Provider - José Miguel [facility]   [] Specialty/Provider -     Medication: Percocet    Dose/Frequency: 5-325 mg     Quantity: #60    Pharmacy: [facility] [address] [facility]   Does the patient have enough for 3 days?   [] Yes   [x] No - Send as HP to POD    Mail Away Pharmacy   Does the patient have enough for 10 days?   [] Yes   [] No - Send as HP to POD

## 2025-04-24 RX ORDER — DIAZEPAM 10 MG/1
10 TABLET ORAL EVERY 8 HOURS PRN
Qty: 90 TABLET | Refills: 0 | Status: SHIPPED | OUTPATIENT
Start: 2025-04-24

## 2025-04-25 DIAGNOSIS — G89.29 OTHER CHRONIC PAIN: ICD-10-CM

## 2025-04-25 NOTE — TELEPHONE ENCOUNTER
Reason for call:   [x] Refill   [] Prior Auth  [] Other:     Office:   [x] PCP/Provider - [facility]  Authorized By: José Miguel Yap MD  [] Specialty/Provider -     Medication:     oxyCODONE-acetaminophen (PERCOCET) 5-325 mg per tablet         Pharmacy: [id number] - [place] - [address] [facility]   Does the patient have enough for 3 days?   [] Yes   [x] No - Send as HP to POD    Mail Away Pharmacy   Does the patient have enough for 10 days?   [] Yes   [] No - Send as HP to POD

## 2025-04-28 DIAGNOSIS — G89.29 OTHER CHRONIC PAIN: ICD-10-CM

## 2025-04-28 NOTE — TELEPHONE ENCOUNTER
Please give pt a call back if/when script can be approved or if there are any changes.      Medication: oxyCODONE-acetaminophen (PERCOCET)     Dose/Frequency: 5-325 mg per tablet; Take 1 tablet by mouth every 8 (eight) hours as needed for moderate pain     Quantity: 60    Pharmacy: [id number] - [place] - [address] [facility]     Office:   [x] PCP/Provider -   [] Speciality/Provider -     Does the patient have enough for 3 days?   [] Yes   [x] No - Send as HP to POD

## 2025-04-29 RX ORDER — OXYCODONE AND ACETAMINOPHEN 5; 325 MG/1; MG/1
1 TABLET ORAL EVERY 8 HOURS PRN
Qty: 60 TABLET | Refills: 0 | Status: SHIPPED | OUTPATIENT
Start: 2025-04-29

## 2025-04-30 RX ORDER — OXYCODONE AND ACETAMINOPHEN 5; 325 MG/1; MG/1
1 TABLET ORAL EVERY 8 HOURS PRN
Qty: 60 TABLET | OUTPATIENT
Start: 2025-04-30

## 2025-05-21 DIAGNOSIS — G47.00 INSOMNIA, UNSPECIFIED TYPE: ICD-10-CM

## 2025-05-21 DIAGNOSIS — S96.912S STRAIN OF LEFT FOOT, SEQUELA: ICD-10-CM

## 2025-05-21 RX ORDER — DIAZEPAM 10 MG/1
10 TABLET ORAL EVERY 8 HOURS PRN
Qty: 90 TABLET | Refills: 0 | Status: SHIPPED | OUTPATIENT
Start: 2025-05-21

## 2025-05-24 DIAGNOSIS — G47.00 INSOMNIA, UNSPECIFIED TYPE: ICD-10-CM

## 2025-05-24 RX ORDER — ZOLPIDEM TARTRATE 10 MG/1
10 TABLET ORAL
Qty: 30 TABLET | Refills: 5 | Status: SHIPPED | OUTPATIENT
Start: 2025-05-24

## 2025-05-24 RX ORDER — ZOLPIDEM TARTRATE 10 MG/1
10 TABLET ORAL
Qty: 30 TABLET | Refills: 0 | OUTPATIENT
Start: 2025-05-24

## 2025-05-24 RX ORDER — DIAZEPAM 10 MG/1
10 TABLET ORAL EVERY 8 HOURS PRN
Qty: 90 TABLET | Refills: 0 | OUTPATIENT
Start: 2025-05-24

## 2025-05-27 DIAGNOSIS — G89.29 OTHER CHRONIC PAIN: ICD-10-CM

## 2025-05-27 NOTE — TELEPHONE ENCOUNTER
Reason for call:   [x] Refill   [] Prior Auth  [] Other:     Office:   [x] PCP/Provider - Pily Mi MD   [] Specialty/Provider -     Medication:     oxyCODONE-acetaminophen (PERCOCET) 5-325 mg per tablet       Dose/Frequency:        Take 1 tablet by mouth every 8 (eight) hours as needed for moderate pain Max Daily Amount: 3 tablets                      Quantity: 60    Pharmacy: [id number] - [place] - [address] [facility] [phone]     Local Pharmacy   Does the patient have enough for 3 days?   [] Yes   [x] No - Send as HP to POD    Mail Away Pharmacy   Does the patient have enough for 10 days?   [] Yes   [] No - Send as HP to POD

## 2025-05-28 RX ORDER — OXYCODONE AND ACETAMINOPHEN 5; 325 MG/1; MG/1
1 TABLET ORAL EVERY 8 HOURS PRN
Qty: 60 TABLET | Refills: 0 | Status: SHIPPED | OUTPATIENT
Start: 2025-05-28

## 2025-06-06 ENCOUNTER — TELEPHONE (OUTPATIENT)
Age: 55
End: 2025-06-06

## 2025-06-06 DIAGNOSIS — I63.9 STROKE (CEREBRUM) (HCC): ICD-10-CM

## 2025-06-06 DIAGNOSIS — I16.1 HYPERTENSIVE EMERGENCY: ICD-10-CM

## 2025-06-06 DIAGNOSIS — I63.9 CEREBROVASCULAR ACCIDENT (CVA), UNSPECIFIED MECHANISM (HCC): ICD-10-CM

## 2025-06-06 NOTE — TELEPHONE ENCOUNTER
Called patient, advised to find a provider online who can possible do the medical marijuana cards-advise patient that Dr Yap does come up as a provider but he does NOT certify.

## 2025-06-06 NOTE — TELEPHONE ENCOUNTER
Vasile called stating that he needs to renew his medical marijuana card. Advised Vasile that Dr Yap no longer handles medical marijuana cards.     Would like to know what his options are with renewing this.    Please advise.

## 2025-06-08 RX ORDER — ASPIRIN 81 MG/1
81 TABLET, CHEWABLE ORAL DAILY
Qty: 90 TABLET | Refills: 1 | Status: SHIPPED | OUTPATIENT
Start: 2025-06-08

## 2025-06-08 RX ORDER — ATORVASTATIN CALCIUM 10 MG/1
10 TABLET, FILM COATED ORAL DAILY
Qty: 30 TABLET | Refills: 0 | Status: SHIPPED | OUTPATIENT
Start: 2025-06-08

## 2025-06-08 RX ORDER — AMLODIPINE BESYLATE 10 MG/1
10 TABLET ORAL DAILY
Qty: 90 TABLET | Refills: 1 | Status: SHIPPED | OUTPATIENT
Start: 2025-06-08

## 2025-06-12 ENCOUNTER — TELEPHONE (OUTPATIENT)
Dept: FAMILY MEDICINE CLINIC | Facility: CLINIC | Age: 55
End: 2025-06-12

## 2025-06-20 DIAGNOSIS — S96.912S STRAIN OF LEFT FOOT, SEQUELA: ICD-10-CM

## 2025-06-23 RX ORDER — DIAZEPAM 10 MG/1
10 TABLET ORAL EVERY 8 HOURS PRN
Qty: 90 TABLET | Refills: 3 | Status: SHIPPED | OUTPATIENT
Start: 2025-06-23

## 2025-06-26 DIAGNOSIS — G89.29 OTHER CHRONIC PAIN: ICD-10-CM

## 2025-06-26 RX ORDER — OXYCODONE AND ACETAMINOPHEN 5; 325 MG/1; MG/1
1 TABLET ORAL EVERY 8 HOURS PRN
Qty: 60 TABLET | Refills: 0 | Status: SHIPPED | OUTPATIENT
Start: 2025-06-26

## 2025-06-26 NOTE — TELEPHONE ENCOUNTER
Reason for call:   [x] Refill   [] Prior Auth  [] Other:     Office:   [x] PCP/Provider - José Miguel Yap MD / [facility]     [] Specialty/Provider -     Medication: oxyCODONE-acetaminophen (PERCOCET) 5-325 mg per tablet       Dose/Frequency: Take 1 tablet by mouth every 8 (eight) hours as needed for moderate pain     Quantity: 60    Pharmacy: [id number] - [place] - [address] [facility]   Does the patient have enough for 3 days?   [] Yes   [x] No - Send as HP to POD    Mail Away Pharmacy   Does the patient have enough for 10 days?   [] Yes   [] No - Send as HP to POD

## 2025-06-27 ENCOUNTER — TELEPHONE (OUTPATIENT)
Dept: FAMILY MEDICINE CLINIC | Facility: CLINIC | Age: 55
End: 2025-06-27

## 2025-06-27 NOTE — TELEPHONE ENCOUNTER
Auto response from insurance. Medication was dispensed on 06/26/25 for 60 tablets per 20 days.

## 2025-07-10 ENCOUNTER — TELEPHONE (OUTPATIENT)
Dept: FAMILY MEDICINE CLINIC | Facility: CLINIC | Age: 55
End: 2025-07-10

## 2025-07-16 DIAGNOSIS — S96.912S STRAIN OF LEFT FOOT, SEQUELA: ICD-10-CM

## 2025-07-16 RX ORDER — TRAMADOL HYDROCHLORIDE 50 MG/1
50 TABLET ORAL EVERY 6 HOURS PRN
Qty: 120 TABLET | Refills: 5 | Status: SHIPPED | OUTPATIENT
Start: 2025-07-16 | End: 2025-07-21 | Stop reason: SDUPTHER

## 2025-07-18 ENCOUNTER — TELEPHONE (OUTPATIENT)
Age: 55
End: 2025-07-18

## 2025-07-18 DIAGNOSIS — S96.912S STRAIN OF LEFT FOOT, SEQUELA: ICD-10-CM

## 2025-07-18 NOTE — TELEPHONE ENCOUNTER
"Please re-send traMADol (ULTRAM) 50 mg tablet to   [id number] - [place] - [address] [facility] [phone]    I did not see Dr Yap's message \"not [facility]\" until after the call.    [facility] of their [facility].       "

## 2025-07-19 DIAGNOSIS — S96.912S STRAIN OF LEFT FOOT, SEQUELA: ICD-10-CM

## 2025-07-21 DIAGNOSIS — G47.00 INSOMNIA, UNSPECIFIED TYPE: ICD-10-CM

## 2025-07-21 DIAGNOSIS — S96.912S STRAIN OF LEFT FOOT, SEQUELA: ICD-10-CM

## 2025-07-21 RX ORDER — TRAMADOL HYDROCHLORIDE 50 MG/1
50 TABLET ORAL EVERY 6 HOURS PRN
Qty: 120 TABLET | Refills: 0 | OUTPATIENT
Start: 2025-07-21

## 2025-07-21 RX ORDER — TRAMADOL HYDROCHLORIDE 50 MG/1
50 TABLET ORAL EVERY 6 HOURS PRN
Qty: 120 TABLET | Refills: 5 | OUTPATIENT
Start: 2025-07-21

## 2025-07-21 RX ORDER — DIAZEPAM 10 MG/1
10 TABLET ORAL EVERY 8 HOURS PRN
Qty: 90 TABLET | Refills: 3 | OUTPATIENT
Start: 2025-07-21

## 2025-07-21 RX ORDER — TRAMADOL HYDROCHLORIDE 50 MG/1
50 TABLET ORAL EVERY 6 HOURS PRN
Qty: 120 TABLET | Refills: 5 | Status: SHIPPED | OUTPATIENT
Start: 2025-07-21

## 2025-07-21 RX ORDER — ZOLPIDEM TARTRATE 10 MG/1
10 TABLET ORAL
Qty: 30 TABLET | Refills: 5 | OUTPATIENT
Start: 2025-07-21

## 2025-07-21 NOTE — TELEPHONE ENCOUNTER
Patient called to request a refill for their tramadol 50 mg advised a refill was requested on 07/16/25 and is pending approval. Patient verbalized understanding and is in agreement.   Patient stated he would like this refill sent to [facility]pharmacy [facility][id number] - [place] - [address] [facility]  because [facility]  Patient only has 1 day left of medication  Does the patient have enough for 3 days?   [] Yes   [x] No - Send as HP to POD

## 2025-07-21 NOTE — TELEPHONE ENCOUNTER
Reason for call: NOT DUPLICATES. Patient is switching pharmacy because [facility]  [] Refill   [] Prior Auth  [x] Other:     Office:   [x] PCP/Provider -   [] Specialty/Provider -     Medication: zolpidem (AMBIEN) 10 mg tablet      Dose/Frequency: Take 1 tablet (10 mg total) by mouth daily at bedtime as needed for sleep     Quantity: 30    Medication: diazepam (VALIUM) 10 mg tablet      Dose/Frequency:  take 1 tablet by mouth every 8 hours if needed for anxiety     Quantity: 90    Pharmacy: [facility]/pharmacy #[id number] - [place] - [address] [address]    Local Pharmacy   Does the patient have enough for 3 days?   [] Yes   [x] No - Send as HP to POD    Mail Away Pharmacy   Does the patient have enough for 10 days?   [] Yes   [] No - Send as HP to POD

## 2025-07-22 DIAGNOSIS — G47.00 INSOMNIA, UNSPECIFIED TYPE: ICD-10-CM

## 2025-07-22 DIAGNOSIS — S96.912S STRAIN OF LEFT FOOT, SEQUELA: ICD-10-CM

## 2025-07-22 RX ORDER — ZOLPIDEM TARTRATE 10 MG/1
10 TABLET ORAL
Qty: 30 TABLET | Refills: 5 | Status: SHIPPED | OUTPATIENT
Start: 2025-07-22

## 2025-07-22 RX ORDER — DIAZEPAM 10 MG/1
10 TABLET ORAL EVERY 8 HOURS PRN
Qty: 90 TABLET | Refills: 3 | Status: SHIPPED | OUTPATIENT
Start: 2025-07-22

## 2025-07-24 DIAGNOSIS — G89.29 OTHER CHRONIC PAIN: ICD-10-CM

## 2025-07-24 RX ORDER — OXYCODONE AND ACETAMINOPHEN 5; 325 MG/1; MG/1
1 TABLET ORAL EVERY 8 HOURS PRN
Qty: 60 TABLET | Refills: 0 | Status: SHIPPED | OUTPATIENT
Start: 2025-07-24

## 2025-07-24 NOTE — TELEPHONE ENCOUNTER
Reason for call:   [x] Refill   [] Prior Auth  [] Other:     Office:   [x] PCP/Provider - José Miguel Yap MD / [facility]   [] Specialty/Provider -     Medication: oxyCODONE-acetaminophen (PERCOCET) 5-325 mg per tablet      Dose/Frequency:  Take 1 tablet by mouth every 8 (eight) hours as needed for moderate pain     Quantity: 60    Pharmacy: [facility]/pharmacy #[id number] - [place] - [address] [facility]   Does the patient have enough for 3 days?   [] Yes   [x] No - Send as HP to POD    Mail Away Pharmacy   Does the patient have enough for 10 days?   [] Yes   [] No - Send as HP to POD

## 2025-07-25 DIAGNOSIS — I63.9 CEREBROVASCULAR ACCIDENT (CVA), UNSPECIFIED MECHANISM (HCC): ICD-10-CM

## 2025-07-30 RX ORDER — ATORVASTATIN CALCIUM 10 MG/1
10 TABLET, FILM COATED ORAL DAILY
Qty: 90 TABLET | Refills: 3 | Status: SHIPPED | OUTPATIENT
Start: 2025-07-30